# Patient Record
Sex: FEMALE | Race: WHITE | NOT HISPANIC OR LATINO | Employment: OTHER | URBAN - METROPOLITAN AREA
[De-identification: names, ages, dates, MRNs, and addresses within clinical notes are randomized per-mention and may not be internally consistent; named-entity substitution may affect disease eponyms.]

---

## 2020-07-31 LAB
SARS-COV-2, PCR: NOT DETECTED
SPECIMEN SOURCE: NORMAL

## 2020-08-04 LAB — SURGICAL PATHOLOGY REPORT: NORMAL

## 2021-03-22 LAB
SARS-COV-2, PCR: NOT DETECTED
SPECIMEN SOURCE: NORMAL

## 2021-03-25 LAB — SURGICAL PATHOLOGY REPORT: NORMAL

## 2023-03-02 PROBLEM — M17.10 LOCALIZED PRIMARY OSTEOARTHRITIS OF LOWER LEG: Status: ACTIVE | Noted: 2023-03-02

## 2023-03-02 PROBLEM — I89.8 LYMPHORRHEA: Status: ACTIVE | Noted: 2023-03-02

## 2023-03-02 PROBLEM — G40.909 NONINTRACTABLE EPILEPSY WITHOUT STATUS EPILEPTICUS (MULTI): Status: ACTIVE | Noted: 2023-03-02

## 2023-03-02 PROBLEM — R55 POSTURAL DIZZINESS WITH PRESYNCOPE: Status: ACTIVE | Noted: 2023-03-02

## 2023-03-02 PROBLEM — R79.89 ELEVATED BRAIN NATRIURETIC PEPTIDE (BNP) LEVEL: Status: ACTIVE | Noted: 2023-03-02

## 2023-03-02 PROBLEM — R27.0 ATAXIA, UNSPECIFIED: Status: ACTIVE | Noted: 2023-03-02

## 2023-03-02 PROBLEM — F07.81 POST-CONCUSSION SYNDROME: Status: ACTIVE | Noted: 2023-03-02

## 2023-03-02 PROBLEM — E83.51 HYPOCALCEMIA: Status: ACTIVE | Noted: 2023-03-02

## 2023-03-02 PROBLEM — R32 URINARY INCONTINENCE: Status: ACTIVE | Noted: 2023-03-02

## 2023-03-02 PROBLEM — H61.23 BILATERAL HEARING LOSS DUE TO CERUMEN IMPACTION: Status: ACTIVE | Noted: 2023-03-02

## 2023-03-02 PROBLEM — G90.8 DYSAUTONOMIA-LIKE DISORDER: Status: ACTIVE | Noted: 2023-03-02

## 2023-03-02 PROBLEM — R42 VERTIGO: Status: ACTIVE | Noted: 2023-03-02

## 2023-03-02 PROBLEM — S83.92XA SPRAIN OF LEFT KNEE: Status: ACTIVE | Noted: 2023-03-02

## 2023-03-02 PROBLEM — H26.9 UNSPECIFIED CATARACT: Status: ACTIVE | Noted: 2023-03-02

## 2023-03-02 PROBLEM — K12.0 APHTHOUS ULCER: Status: ACTIVE | Noted: 2023-03-02

## 2023-03-02 PROBLEM — M25.559 JOINT PAIN, HIP: Status: ACTIVE | Noted: 2023-03-02

## 2023-03-02 PROBLEM — M17.12 OSTEOARTHRITIS OF LEFT KNEE: Status: ACTIVE | Noted: 2023-03-02

## 2023-03-02 PROBLEM — R00.1 BRADYCARDIA: Status: ACTIVE | Noted: 2023-03-02

## 2023-03-02 PROBLEM — Z96.652 TOTAL KNEE REPLACEMENT STATUS, LEFT: Status: ACTIVE | Noted: 2023-03-02

## 2023-03-02 PROBLEM — R74.8 ELEVATED ALKALINE PHOSPHATASE LEVEL: Status: ACTIVE | Noted: 2023-03-02

## 2023-03-02 PROBLEM — M06.9 RHEUMATOID ARTHRITIS (MULTI): Status: ACTIVE | Noted: 2023-03-02

## 2023-03-02 PROBLEM — D64.9 ANEMIA: Status: ACTIVE | Noted: 2023-03-02

## 2023-03-02 PROBLEM — G40.209: Status: ACTIVE | Noted: 2023-03-02
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
## 2023-03-02 PROBLEM — B00.9 HERPES SIMPLEX: Status: ACTIVE | Noted: 2023-03-02

## 2023-03-02 PROBLEM — K59.00 ACUTE CONSTIPATION: Status: ACTIVE | Noted: 2023-03-02

## 2023-03-02 PROBLEM — R15.9 STOOL INCONTINENCE: Status: ACTIVE | Noted: 2023-03-02

## 2023-03-02 PROBLEM — K59.09 CHRONIC CONSTIPATION: Status: ACTIVE | Noted: 2023-03-02

## 2023-03-02 PROBLEM — R35.0 FREQUENT URINATION: Status: ACTIVE | Noted: 2023-03-02

## 2023-03-02 PROBLEM — I72.9 ANEURYSM (CMS-HCC): Status: ACTIVE | Noted: 2023-03-02

## 2023-03-02 PROBLEM — R26.2 DIFFICULTY WALKING: Status: ACTIVE | Noted: 2023-03-02

## 2023-03-02 PROBLEM — R53.1 GENERALIZED WEAKNESS: Status: ACTIVE | Noted: 2023-03-02

## 2023-03-02 PROBLEM — R42 POSTURAL DIZZINESS WITH PRESYNCOPE: Status: ACTIVE | Noted: 2023-03-02

## 2023-03-02 PROBLEM — K21.9 ESOPHAGEAL REFLUX: Status: ACTIVE | Noted: 2023-03-02

## 2023-03-02 PROBLEM — M25.561 RIGHT KNEE PAIN: Status: ACTIVE | Noted: 2023-03-02

## 2023-03-02 PROBLEM — S63.259A FINGER DISLOCATION: Status: ACTIVE | Noted: 2023-03-02

## 2023-03-02 PROBLEM — I10 BENIGN ESSENTIAL HTN: Status: ACTIVE | Noted: 2023-03-02

## 2023-03-02 PROBLEM — B37.2 CANDIDAL INTERTRIGO: Status: ACTIVE | Noted: 2023-03-02

## 2023-03-02 PROBLEM — M54.50 LOW BACK PAIN: Status: ACTIVE | Noted: 2023-03-02

## 2023-03-02 PROBLEM — M54.10 RADICULOPATHY: Status: ACTIVE | Noted: 2023-03-02

## 2023-03-02 PROBLEM — S00.83XA FACIAL BRUISING: Status: ACTIVE | Noted: 2023-03-02

## 2023-03-02 PROBLEM — N39.0 URINARY TRACT INFECTION: Status: ACTIVE | Noted: 2023-03-02

## 2023-03-02 PROBLEM — Z96.653 STATUS POST BILATERAL KNEE REPLACEMENTS: Status: ACTIVE | Noted: 2023-03-02

## 2023-03-02 PROBLEM — E87.1 HYPONATREMIA: Status: ACTIVE | Noted: 2023-03-02

## 2023-03-02 PROBLEM — M16.10 UNILATERAL PRIMARY OSTEOARTHRITIS, UNSPECIFIED HIP: Status: ACTIVE | Noted: 2023-03-02

## 2023-03-02 PROBLEM — I10 UNCONTROLLED HYPERTENSION: Status: ACTIVE | Noted: 2023-03-02

## 2023-03-02 PROBLEM — I35.0 AORTIC STENOSIS, MILD: Status: ACTIVE | Noted: 2023-03-02

## 2023-03-02 PROBLEM — R26.9 GAIT DISORDER: Status: ACTIVE | Noted: 2023-03-02

## 2023-03-02 PROBLEM — G47.33 OBSTRUCTIVE SLEEP APNEA: Status: ACTIVE | Noted: 2023-03-02

## 2023-03-02 PROBLEM — E55.9 VITAMIN D DEFICIENCY: Status: ACTIVE | Noted: 2023-03-02

## 2023-03-02 PROBLEM — M19.90 OSTEOARTHRITIS: Status: ACTIVE | Noted: 2023-03-02

## 2023-03-02 PROBLEM — M19.90 ARTHRITIS: Status: ACTIVE | Noted: 2023-03-02

## 2023-03-02 PROBLEM — M16.9 OSTEOARTHROSIS, HIP: Status: ACTIVE | Noted: 2023-03-02

## 2023-03-02 PROBLEM — G90.89 DYSAUTONOMIA-LIKE DISORDER: Status: ACTIVE | Noted: 2023-03-02

## 2023-03-02 PROBLEM — R30.0 DYSURIA: Status: ACTIVE | Noted: 2023-03-02

## 2023-03-02 PROBLEM — R55 SYNCOPE AND COLLAPSE: Status: ACTIVE | Noted: 2023-03-02

## 2023-03-02 PROBLEM — D12.6 TUBULOVILLOUS ADENOMA OF COLON: Status: ACTIVE | Noted: 2023-03-02

## 2023-03-02 PROBLEM — G44.52 NEW DAILY PERSISTENT HEADACHE: Status: ACTIVE | Noted: 2023-03-02

## 2023-03-02 PROBLEM — S80.212A ABRASION OF KNEE, LEFT: Status: ACTIVE | Noted: 2023-03-02

## 2023-03-02 PROBLEM — R21 RASH: Status: ACTIVE | Noted: 2023-03-02

## 2023-03-02 PROBLEM — M25.511 PAIN IN JOINT OF RIGHT SHOULDER: Status: ACTIVE | Noted: 2023-03-02

## 2023-03-02 PROBLEM — R56.9 UNSPECIFIED CONVULSIONS (MULTI): Status: ACTIVE | Noted: 2023-03-02

## 2023-03-02 PROBLEM — K42.9 UMBILICAL HERNIA WITHOUT OBSTRUCTION OR GANGRENE: Status: ACTIVE | Noted: 2023-03-02

## 2023-03-02 PROBLEM — T22.219A: Status: ACTIVE | Noted: 2023-03-02

## 2023-03-02 PROBLEM — R29.898 WEAKNESS OF LEFT LOWER EXTREMITY: Status: ACTIVE | Noted: 2023-03-02

## 2023-03-02 PROBLEM — H01.009 BLEPHARITIS: Status: ACTIVE | Noted: 2023-03-02

## 2023-03-02 PROBLEM — J01.20 SINUSITIS, ACUTE ETHMOIDAL: Status: ACTIVE | Noted: 2023-03-02

## 2023-03-02 PROBLEM — M25.611 DECREASED RANGE OF MOTION OF SHOULDER, RIGHT: Status: ACTIVE | Noted: 2023-03-02

## 2023-03-02 PROBLEM — F41.9 ANXIETY DISORDER: Status: ACTIVE | Noted: 2023-03-02

## 2023-03-02 PROBLEM — N81.4 UTERINE PROLAPSE: Status: ACTIVE | Noted: 2023-03-02

## 2023-04-03 DIAGNOSIS — I10 UNCONTROLLED HYPERTENSION: ICD-10-CM

## 2023-04-03 DIAGNOSIS — I10 ESSENTIAL (PRIMARY) HYPERTENSION: ICD-10-CM

## 2023-04-03 RX ORDER — AMLODIPINE BESYLATE 2.5 MG/1
2.5 TABLET ORAL DAILY
COMMUNITY
End: 2023-10-23 | Stop reason: SDUPTHER

## 2023-04-03 RX ORDER — ATORVASTATIN CALCIUM 40 MG/1
40 TABLET, FILM COATED ORAL DAILY
COMMUNITY
End: 2023-10-23 | Stop reason: SDUPTHER

## 2023-04-03 RX ORDER — HYDROXYCHLOROQUINE SULFATE 200 MG/1
1 TABLET, FILM COATED ORAL
COMMUNITY
End: 2024-04-08

## 2023-04-03 RX ORDER — ACETAMINOPHEN 500 MG
1 TABLET ORAL DAILY
COMMUNITY

## 2023-04-03 RX ORDER — PNV NO.95/FERROUS FUM/FOLIC AC 28MG-0.8MG
1 TABLET ORAL DAILY
COMMUNITY

## 2023-04-03 RX ORDER — FOLIC ACID 1 MG/1
1 TABLET ORAL DAILY
COMMUNITY
End: 2024-02-08 | Stop reason: SDUPTHER

## 2023-04-07 RX ORDER — FUROSEMIDE 40 MG/1
TABLET ORAL
Qty: 30 TABLET | Refills: 0 | Status: SHIPPED | OUTPATIENT
Start: 2023-04-07 | End: 2023-10-23 | Stop reason: SDUPTHER

## 2023-04-09 RX ORDER — TALC
250 POWDER (GRAM) TOPICAL
COMMUNITY

## 2023-04-09 RX ORDER — OXCARBAZEPINE 300 MG/1
3.5 TABLET, FILM COATED ORAL 2 TIMES DAILY
COMMUNITY
End: 2024-02-06 | Stop reason: SDUPTHER

## 2023-04-09 RX ORDER — METHOTREXATE 2.5 MG/1
5 TABLET ORAL
COMMUNITY
End: 2024-05-02 | Stop reason: SDUPTHER

## 2023-04-09 RX ORDER — TROSPIUM CHLORIDE 20 MG/1
20 TABLET, FILM COATED ORAL 2 TIMES DAILY
Status: ON HOLD | COMMUNITY
End: 2024-03-07 | Stop reason: WASHOUT

## 2023-04-09 RX ORDER — GUAIFENESIN 600 MG/1
1 TABLET, EXTENDED RELEASE ORAL DAILY PRN
COMMUNITY

## 2023-04-09 RX ORDER — SERTRALINE HYDROCHLORIDE 25 MG/1
1 TABLET, FILM COATED ORAL DAILY
COMMUNITY
Start: 2022-02-28 | End: 2023-10-23 | Stop reason: SDUPTHER

## 2023-04-09 RX ORDER — LEVETIRACETAM 500 MG/1
500 TABLET ORAL 2 TIMES DAILY
Status: ON HOLD | COMMUNITY
End: 2024-03-07 | Stop reason: WASHOUT

## 2023-04-09 RX ORDER — MECLIZINE HYDROCHLORIDE 25 MG/1
25 TABLET ORAL 3 TIMES DAILY PRN
Status: ON HOLD | COMMUNITY
End: 2024-03-07 | Stop reason: WASHOUT

## 2023-07-12 DIAGNOSIS — F41.9 ANXIETY DISORDER, UNSPECIFIED: ICD-10-CM

## 2023-07-16 RX ORDER — SERTRALINE HYDROCHLORIDE 100 MG/1
TABLET, FILM COATED ORAL
Qty: 90 TABLET | Refills: 3 | Status: SHIPPED | OUTPATIENT
Start: 2023-07-16

## 2023-07-17 LAB
ALANINE AMINOTRANSFERASE (SGPT) (U/L) IN SER/PLAS: 16 U/L (ref 7–45)
ALBUMIN (G/DL) IN SER/PLAS: 4 G/DL (ref 3.4–5)
ALKALINE PHOSPHATASE (U/L) IN SER/PLAS: 142 U/L (ref 33–136)
ANION GAP IN SER/PLAS: 15 MMOL/L (ref 10–20)
ASPARTATE AMINOTRANSFERASE (SGOT) (U/L) IN SER/PLAS: 34 U/L (ref 9–39)
BILIRUBIN TOTAL (MG/DL) IN SER/PLAS: 0.3 MG/DL (ref 0–1.2)
C REACTIVE PROTEIN (MG/L) IN SER/PLAS: 0.81 MG/DL
CALCIUM (MG/DL) IN SER/PLAS: 8.8 MG/DL (ref 8.6–10.3)
CARBON DIOXIDE, TOTAL (MMOL/L) IN SER/PLAS: 26 MMOL/L (ref 21–32)
CHLORIDE (MMOL/L) IN SER/PLAS: 97 MMOL/L (ref 98–107)
CREATININE (MG/DL) IN SER/PLAS: 0.75 MG/DL (ref 0.5–1.05)
ERYTHROCYTE DISTRIBUTION WIDTH (RATIO) BY AUTOMATED COUNT: 13.8 % (ref 11.5–14.5)
ERYTHROCYTE MEAN CORPUSCULAR HEMOGLOBIN CONCENTRATION (G/DL) BY AUTOMATED: 33.1 G/DL (ref 32–36)
ERYTHROCYTE MEAN CORPUSCULAR VOLUME (FL) BY AUTOMATED COUNT: 96 FL (ref 80–100)
ERYTHROCYTES (10*6/UL) IN BLOOD BY AUTOMATED COUNT: 3.64 X10E12/L (ref 4–5.2)
GFR FEMALE: 82 ML/MIN/1.73M2
GLUCOSE (MG/DL) IN SER/PLAS: 65 MG/DL (ref 74–99)
HEMATOCRIT (%) IN BLOOD BY AUTOMATED COUNT: 35 % (ref 36–46)
HEMOGLOBIN (G/DL) IN BLOOD: 11.6 G/DL (ref 12–16)
LEUKOCYTES (10*3/UL) IN BLOOD BY AUTOMATED COUNT: 6.4 X10E9/L (ref 4.4–11.3)
PLATELETS (10*3/UL) IN BLOOD AUTOMATED COUNT: 221 X10E9/L (ref 150–450)
POTASSIUM (MMOL/L) IN SER/PLAS: 4.5 MMOL/L (ref 3.5–5.3)
PROTEIN TOTAL: 6.4 G/DL (ref 6.4–8.2)
SEDIMENTATION RATE, ERYTHROCYTE: 24 MM/H (ref 0–30)
SODIUM (MMOL/L) IN SER/PLAS: 133 MMOL/L (ref 136–145)
UREA NITROGEN (MG/DL) IN SER/PLAS: 16 MG/DL (ref 6–23)

## 2023-07-20 LAB — OXCARB OR ESLICARB METABOLITE (MHD): 35 UG/ML (ref 3–35)

## 2023-08-14 LAB — URINE CULTURE: ABNORMAL

## 2023-09-20 PROBLEM — E66.813 CLASS 3 SEVERE OBESITY DUE TO EXCESS CALORIES WITH BODY MASS INDEX (BMI) OF 40.0 TO 44.9 IN ADULT: Status: ACTIVE | Noted: 2023-09-20

## 2023-09-20 PROBLEM — E66.9 CLASS 2 OBESITY WITH BODY MASS INDEX (BMI) OF 38.0 TO 38.9 IN ADULT: Status: ACTIVE | Noted: 2023-09-20

## 2023-09-20 PROBLEM — H90.3 ASYMMETRICAL SENSORINEURAL HEARING LOSS: Status: ACTIVE | Noted: 2021-07-08

## 2023-09-20 PROBLEM — Z98.890 HISTORY OF UMBILICAL HERNIA REPAIR: Status: ACTIVE | Noted: 2023-09-20

## 2023-09-20 PROBLEM — Z04.9 CONDITION NOT FOUND: Status: ACTIVE | Noted: 2023-09-20

## 2023-09-20 PROBLEM — M25.561 RIGHT KNEE PAIN: Status: RESOLVED | Noted: 2023-03-02 | Resolved: 2023-09-20

## 2023-09-20 PROBLEM — E66.812 CLASS 2 OBESITY WITH BODY MASS INDEX (BMI) OF 38.0 TO 38.9 IN ADULT: Status: ACTIVE | Noted: 2023-09-20

## 2023-09-20 PROBLEM — E66.01 CLASS 2 SEVERE OBESITY WITH SERIOUS COMORBIDITY AND BODY MASS INDEX (BMI) OF 38.0 TO 38.9 IN ADULT (MULTI): Status: ACTIVE | Noted: 2023-09-20

## 2023-09-20 PROBLEM — M25.511 PAIN IN JOINT OF RIGHT SHOULDER: Status: RESOLVED | Noted: 2023-03-02 | Resolved: 2023-09-20

## 2023-09-20 PROBLEM — N81.6 POSTERIOR VAGINAL WALL PROLAPSE: Status: ACTIVE | Noted: 2023-09-20

## 2023-09-20 PROBLEM — N95.2 ATROPHY OF VAGINA: Status: ACTIVE | Noted: 2023-09-20

## 2023-09-20 PROBLEM — Z87.19 HISTORY OF UMBILICAL HERNIA REPAIR: Status: ACTIVE | Noted: 2023-09-20

## 2023-09-20 PROBLEM — J01.20 SINUSITIS, ACUTE ETHMOIDAL: Status: RESOLVED | Noted: 2023-03-02 | Resolved: 2023-09-20

## 2023-09-20 PROBLEM — N39.0 URINARY TRACT INFECTION: Status: RESOLVED | Noted: 2023-03-02 | Resolved: 2023-09-20

## 2023-09-20 PROBLEM — K59.00 ACUTE CONSTIPATION: Status: RESOLVED | Noted: 2023-03-02 | Resolved: 2023-09-20

## 2023-09-20 PROBLEM — Z91.81 H/O FALLING: Status: ACTIVE | Noted: 2023-09-20

## 2023-09-20 PROBLEM — E66.812 CLASS 2 SEVERE OBESITY WITH SERIOUS COMORBIDITY AND BODY MASS INDEX (BMI) OF 38.0 TO 38.9 IN ADULT: Status: ACTIVE | Noted: 2023-09-20

## 2023-09-20 PROBLEM — M81.0 AGE RELATED OSTEOPOROSIS: Status: ACTIVE | Noted: 2023-09-20

## 2023-09-20 PROBLEM — H90.3 SENSORINEURAL HEARING LOSS, BILATERAL: Status: ACTIVE | Noted: 2021-07-08

## 2023-09-20 PROBLEM — M54.50 LOW BACK PAIN: Status: RESOLVED | Noted: 2023-03-02 | Resolved: 2023-09-20

## 2023-09-20 PROBLEM — M25.559 JOINT PAIN, HIP: Status: RESOLVED | Noted: 2023-03-02 | Resolved: 2023-09-20

## 2023-09-20 PROBLEM — E66.01 CLASS 3 SEVERE OBESITY DUE TO EXCESS CALORIES WITH BODY MASS INDEX (BMI) OF 40.0 TO 44.9 IN ADULT (MULTI): Status: ACTIVE | Noted: 2023-09-20

## 2023-09-20 PROBLEM — R60.9 EDEMA: Status: ACTIVE | Noted: 2023-09-20

## 2023-09-20 RX ORDER — NYSTATIN 100000 U/G
CREAM TOPICAL 2 TIMES DAILY
Status: ON HOLD | COMMUNITY
End: 2024-03-07 | Stop reason: WASHOUT

## 2023-09-20 RX ORDER — AMLODIPINE BESYLATE 5 MG/1
5 TABLET ORAL EVERY 12 HOURS
COMMUNITY
Start: 2023-07-02 | End: 2023-10-23 | Stop reason: SDUPTHER

## 2023-09-20 RX ORDER — POLYETHYLENE GLYOCOL 3350, SODIUM CHLORIDE, SODIUM BICARBONATE AND POTASSIUM CHLORIDE 420; 11.2; 5.72; 1.48 G/4L; G/4L; G/4L; G/4L
POWDER, FOR SOLUTION NASOGASTRIC; ORAL
Status: ON HOLD | COMMUNITY
End: 2024-03-07 | Stop reason: WASHOUT

## 2023-09-20 RX ORDER — OXYBUTYNIN CHLORIDE 5 MG/1
1 TABLET ORAL 2 TIMES DAILY
Status: ON HOLD | COMMUNITY
End: 2024-03-07 | Stop reason: WASHOUT

## 2023-09-20 RX ORDER — POLYETHYLENE GLYCOL 3350, SODIUM SULFATE ANHYDROUS, SODIUM BICARBONATE, SODIUM CHLORIDE, POTASSIUM CHLORIDE 236; 22.74; 6.74; 5.86; 2.97 G/4L; G/4L; G/4L; G/4L; G/4L
POWDER, FOR SOLUTION ORAL
COMMUNITY
End: 2023-10-23 | Stop reason: SDUPTHER

## 2023-09-20 RX ORDER — OMEPRAZOLE 20 MG/1
40 TABLET, DELAYED RELEASE ORAL
COMMUNITY
Start: 2023-07-10

## 2023-09-20 RX ORDER — CARVEDILOL 25 MG/1
0.5 TABLET ORAL EVERY 12 HOURS SCHEDULED
COMMUNITY
End: 2024-03-27 | Stop reason: WASHOUT

## 2023-09-20 RX ORDER — DOXYCYCLINE 100 MG/1
1 CAPSULE ORAL EVERY 12 HOURS
Status: ON HOLD | COMMUNITY
End: 2024-03-07 | Stop reason: WASHOUT

## 2023-09-20 RX ORDER — CIPROFLOXACIN 500 MG/1
1 TABLET ORAL 2 TIMES DAILY
Status: ON HOLD | COMMUNITY
Start: 2023-02-06 | End: 2024-03-07 | Stop reason: WASHOUT

## 2023-09-20 RX ORDER — TRIAMCINOLONE ACETONIDE 1 MG/G
1 CREAM TOPICAL 2 TIMES DAILY
Status: ON HOLD | COMMUNITY
End: 2024-03-07 | Stop reason: WASHOUT

## 2023-09-20 RX ORDER — NITROFURANTOIN 25; 75 MG/1; MG/1
1 CAPSULE ORAL
Status: ON HOLD | COMMUNITY
Start: 2023-01-11 | End: 2024-03-07 | Stop reason: WASHOUT

## 2023-09-20 RX ORDER — IBUPROFEN 200 MG
200 TABLET ORAL NIGHTLY
Status: ON HOLD | COMMUNITY
End: 2024-03-07 | Stop reason: WASHOUT

## 2023-09-20 RX ORDER — VIT A/VIT C/VIT E/ZINC/COPPER 4296-226
CAPSULE ORAL
COMMUNITY
Start: 2022-10-03 | End: 2024-02-15 | Stop reason: SDUPTHER

## 2023-09-20 RX ORDER — POTASSIUM CHLORIDE 20 MEQ/1
20 TABLET, EXTENDED RELEASE ORAL DAILY
Status: ON HOLD | COMMUNITY
End: 2024-03-07 | Stop reason: WASHOUT

## 2023-09-20 RX ORDER — FAMCICLOVIR 500 MG/1
500 TABLET ORAL 2 TIMES DAILY PRN
COMMUNITY
Start: 2022-10-03 | End: 2024-03-27 | Stop reason: WASHOUT

## 2023-10-23 ENCOUNTER — OFFICE VISIT (OUTPATIENT)
Dept: CARDIOLOGY | Facility: HOSPITAL | Age: 78
End: 2023-10-23
Payer: MEDICARE

## 2023-10-23 VITALS
WEIGHT: 212.96 LBS | SYSTOLIC BLOOD PRESSURE: 147 MMHG | HEART RATE: 68 BPM | OXYGEN SATURATION: 97 % | DIASTOLIC BLOOD PRESSURE: 68 MMHG | BODY MASS INDEX: 37.72 KG/M2

## 2023-10-23 DIAGNOSIS — I10 ESSENTIAL (PRIMARY) HYPERTENSION: ICD-10-CM

## 2023-10-23 DIAGNOSIS — R55 SYNCOPE, UNSPECIFIED SYNCOPE TYPE: Primary | ICD-10-CM

## 2023-10-23 PROCEDURE — 3077F SYST BP >= 140 MM HG: CPT | Performed by: NURSE PRACTITIONER

## 2023-10-23 PROCEDURE — 3078F DIAST BP <80 MM HG: CPT | Performed by: NURSE PRACTITIONER

## 2023-10-23 PROCEDURE — 99213 OFFICE O/P EST LOW 20 MIN: CPT | Mod: PO | Performed by: NURSE PRACTITIONER

## 2023-10-23 PROCEDURE — 1159F MED LIST DOCD IN RCRD: CPT | Performed by: NURSE PRACTITIONER

## 2023-10-23 PROCEDURE — 99213 OFFICE O/P EST LOW 20 MIN: CPT | Performed by: NURSE PRACTITIONER

## 2023-10-23 PROCEDURE — 1126F AMNT PAIN NOTED NONE PRSNT: CPT | Performed by: NURSE PRACTITIONER

## 2023-10-23 PROCEDURE — 1036F TOBACCO NON-USER: CPT | Performed by: NURSE PRACTITIONER

## 2023-10-23 RX ORDER — FUROSEMIDE 40 MG/1
TABLET ORAL
Qty: 30 TABLET | Refills: 3 | Status: SHIPPED | OUTPATIENT
Start: 2023-10-23

## 2023-10-23 RX ORDER — AMLODIPINE BESYLATE 5 MG/1
5 TABLET ORAL EVERY 12 HOURS
Qty: 180 TABLET | Refills: 3 | Status: ON HOLD | OUTPATIENT
Start: 2023-10-23 | End: 2024-04-02 | Stop reason: SDUPTHER

## 2023-10-23 RX ORDER — ATORVASTATIN CALCIUM 40 MG/1
40 TABLET, FILM COATED ORAL DAILY
Qty: 90 TABLET | Refills: 3 | Status: ON HOLD | OUTPATIENT
Start: 2023-10-23 | End: 2024-04-02 | Stop reason: SDUPTHER

## 2023-10-23 ASSESSMENT — ENCOUNTER SYMPTOMS
BLURRED VISION: 1
GASTROINTESTINAL NEGATIVE: 1
LIGHT-HEADEDNESS: 1
DEPRESSION: 0
ENDOCRINE NEGATIVE: 1
CARDIOVASCULAR NEGATIVE: 1
PSYCHIATRIC NEGATIVE: 1
CONSTITUTIONAL NEGATIVE: 1
RESPIRATORY NEGATIVE: 1
MYALGIAS: 1
DIZZINESS: 1
ALLERGIC/IMMUNOLOGIC NEGATIVE: 1

## 2023-10-23 NOTE — PROGRESS NOTES
Referred by Dr. Neil ref. provider found for Follow-up (Routine.)     History Of Present Illness:    Mariajose Haq is a very pleasant 78 year old female with a history of HTN and syncope she is here for a follow up visit. The patient is seen in collaboration with Dr. Tran. Mrs. Haq had had a few episodes of lightheadedness. Denies syncope. Denies chest pain or shortness of breath. Continues to stay active as she can.     Review of Systems   Constitutional: Negative.   HENT: Negative.     Eyes:  Positive for blurred vision.   Cardiovascular: Negative.    Respiratory: Negative.     Endocrine: Negative.    Skin: Negative.    Musculoskeletal:  Positive for muscle weakness and myalgias.   Gastrointestinal: Negative.    Neurological:  Positive for dizziness and light-headedness.   Psychiatric/Behavioral: Negative.     Allergic/Immunologic: Negative.         Past Medical History:  She has a past medical history of Ataxia, unspecified, Other conditions influencing health status, Other conditions influencing health status, Other specified anxiety disorders, Other specified anxiety disorders, Pain in unspecified joint, Personal history of other diseases of the circulatory system (09/27/2018), Personal history of other diseases of the nervous system and sense organs, Personal history of other specified conditions, Umbilical hernia without obstruction or gangrene, Unspecified cataract, Unspecified convulsions (CMS/HCC), and Unspecified convulsions (CMS/HCC).    Past Surgical History:  She has a past surgical history that includes Other surgical history (03/07/2013); Colonoscopy (03/11/2013); Other surgical history (06/08/2016); Other surgical history (07/21/2020); Cataract extraction (07/17/2013); Other surgical history (05/12/2020); Other surgical history (05/12/2020); CT angio head w and wo IV contrast (1/6/2016); CT angio head w and wo IV contrast (3/1/2016); CT angio head w and wo IV contrast (5/22/2020); and CT  angio neck w and wo IV contrast (5/22/2020).      Social History:  She reports that she has never smoked. She has never used smokeless tobacco. She reports that she does not currently use alcohol. She reports that she does not use drugs.    Family History:  Family History   Problem Relation Name Age of Onset    Alzheimer's disease Mother      Colon cancer Father      Diabetes Father      Hypertension Father      Subarachnoid hemorrhage Father          nontraumatic    Hypertension Sister      Other (Cardiac problems) Sister      Cancer Brother      Gout Brother      Hypertension Brother      Pancreatitis Brother      Coronary artery disease Paternal Grandfather      Autism Other          Childhood onset pervasive developmental autistic disorder    Hypertension Other          Allergies:  Clobazam, Lamotrigine, Levetiracetam, Topiramate, and Penicillins    Outpatient Medications:  Current Outpatient Medications   Medication Instructions    amLODIPine (NORVASC) 5 mg, oral, Every 12 hours    antiox.mv no.10/omeg3s/lut/day (I-CAPS ORAL) oral, As directed    atorvastatin (LIPITOR) 40 mg, oral, Daily    B2/vits A,C,E/lut/zeaxanth/min (ICAPS ORAL) Take as directed    carvedilol (Coreg) 25 mg tablet 0.5 tablets, oral, Every 12 hours scheduled    cholecalciferol (Vitamin D-3) 50 mcg (2,000 unit) capsule 1 capsule, oral, Daily    ciprofloxacin (Cipro) 500 mg tablet 1 tablet, oral, 2 times daily    cyanocobalamin (Vitamin B-12) 100 mcg tablet 1 tablet, oral, Daily    diclofenac sodium 1 % kit Topical, 4 times daily    doxycycline (Vibramycin) 100 mg capsule 1 capsule, oral, Every 12 hours    estrogens, conjugated, (Premarin) vaginal cream Insert 0.5 Applicator bedtime 0.5 g per vagina 2 nights a week    famciclovir (FAMVIR) 500 mg, oral, 2 times daily PRN    folic acid (FOLVITE) 1 mg, oral, Daily    furosemide (Lasix) 40 mg tablet TAKE 1 TABLET BY MOUTH EVERY DAY AS NEEDED    guaiFENesin (Mucinex) 600 mg 12 hr tablet 1-2  tablets, oral, 2 times daily PRN    hydroxychloroquine (Plaquenil) 200 mg tablet 1 tablet, oral, 2 times daily with meals    ibuprofen-diphenhydramine cit 200-38 mg tablet per tablet 2 tablets, oral, Nightly    ibuprofen 200 mg, oral, Every 8 hours    levETIRAcetam (KEPPRA) 500 mg, oral, 2 times daily    magnesium oxide (MAG-OX) 250 mg, oral, Once or twice daily to relax muscles    meclizine (ANTIVERT) 25 mg, oral, 3 times daily PRN    methotrexate (Trexall) 2.5 mg tablet 5 tablets, oral, Weekly    nitrofurantoin, macrocrystal-monohydrate, (Macrobid) 100 mg capsule 1 capsule, oral, 2 times daily after meals    nystatin (Mycostatin) cream Topical, 2 times daily    omeprazole OTC (PriLOSEC OTC) 20 mg EC tablet 1 tablet, oral, 2 times daily    OXcarbazepine (Trileptal) 300 mg tablet 3.5 tablets, oral, 2 times daily    oxybutynin (Ditropan) 5 mg tablet 1 tablet, oral, 2 times daily    polyethylene glycol-electrolytes (TriLyte With Flavor Packets) 420 gram solution oral,  Drink 2 L (2,000 mL) at 500 pm the night prior to the procedure then drink the other 2 L (2,000 mL) 5 hours prior to procedure time.    potassium chloride CR 20 mEq ER tablet 20 mEq, oral, Daily, Do not crush or chew.    sertraline (Zoloft) 100 mg tablet TAKE 1 TABLET ONCE DAILY.    triamcinolone (Kenalog) 0.1 % cream 1 Film, Topical, 2 times daily    trospium (SANCTURA) 20 mg, oral, 2 times daily    vitamins A,C,E-zinc-copper (ICaps AREDS) 4,296 mcg-226 mg-90 mg capsule oral, As directed        Last Recorded Vitals:  Vitals:    10/23/23 1012   BP: 147/68   Pulse: 68   SpO2: 97%   Weight: 96.6 kg (212 lb 15.4 oz)       Physical Exam:  Physical Exam  Vitals reviewed.   HENT:      Head: Normocephalic.      Nose: Nose normal.   Eyes:      Pupils: Pupils are equal, round, and reactive to light.   Cardiovascular:      Rate and Rhythm: Normal rate and regular rhythm.   Pulmonary:      Effort: Pulmonary effort is normal.      Breath sounds: Normal breath  sounds.   Abdominal:      General: Abdomen is flat.      Palpations: Abdomen is soft.   Musculoskeletal:         General: Normal range of motion.      Cervical back: Normal range of motion.   Skin:     General: Skin is warm and dry.   Neurological:      General: No focal deficit present.      Mental Status: He is alert and oriented to person, place, and time.   Psychiatric:         Mood and Affect: Mood normal.            Last Labs:  CBC -  Lab Results   Component Value Date    WBC 6.4 07/17/2023    HGB 11.6 (L) 07/17/2023    HCT 35.0 (L) 07/17/2023    MCV 96 07/17/2023     07/17/2023       CMP -  Lab Results   Component Value Date    CALCIUM 8.8 07/17/2023    PHOS 2.9 10/27/2021    PROT 6.4 07/17/2023    ALBUMIN 4.0 07/17/2023    AST 34 07/17/2023    ALT 16 07/17/2023    ALKPHOS 142 (H) 07/17/2023    BILITOT 0.3 07/17/2023       LIPID PANEL -   Lab Results   Component Value Date    CHOL 240 (H) 11/11/2022    TRIG 75 11/11/2022    HDL 88.8 11/11/2022    CHHDL 2.7 11/11/2022    LDLF 136 (H) 11/11/2022    VLDL 15 11/11/2022       RENAL FUNCTION PANEL -   Lab Results   Component Value Date    GLUCOSE 65 (L) 07/17/2023     (L) 07/17/2023    K 4.5 07/17/2023    CL 97 (L) 07/17/2023    CO2 26 07/17/2023    ANIONGAP 15 07/17/2023    BUN 16 07/17/2023    CREATININE 0.75 07/17/2023    CALCIUM 8.8 07/17/2023    PHOS 2.9 10/27/2021    ALBUMIN 4.0 07/17/2023        Lab Results   Component Value Date    BNP 33 04/08/2022       Last Cardiology Tests:  ECG:    Echo:  Echocardiogram 4/8/2022  1. The left ventricular systolic function is normal with a 55-60% estimated ejection fraction.  2. Spectral Doppler shows an impaired relaxation pattern of left ventricular diastolic filling.  3. Mild aortic valve stenosis.  4. There is mild aortic valve regurgitation.  5. There is mild mitral and tricuspid regurgitation.  6. The estimated pulmonary artery pressure is mildly elevated with the RVSP at 40.5 mmHg.      Echocardiogram  3/22/2021  1. The left ventricular systolic function is normal with a 60-65% estimated  ejection fraction.  2. Spectral Doppler shows an impaired relaxation pattern of left ventricular  diastolic filling.  3. The left atrium is moderately dilated.  4. Mild aortic valve stenosis.  5. There is mild aortic valve regurgitation.  6. There is mild mitral and tricuspid regurgitation.  7. The estimated pulmonary artery pressure is mildly elevated with the RVSP at  46.2 mmHg.    echocardiogram 5/25/2018   1. The left ventricular systolic function is normal with a 60% estimated  ejection fraction.   2. Spectral Doppler shows an impaired relaxation pattern of left ventricular  diastolic filling.   3. The left atrium is moderately dilated.   4. Mild aortic valve stenosis.   5. The estimated pulmonary artery pressure is mildly elevated with the RVSP at  36.5 mmHg.  Echo August 2013:  The left ventricular chamber size is normal.  There is left ventricular hypertrophy noted.  Global left ventricular wall motion and contractility are within normal  limits.  There is an E to A reversal in the mitral valve flow pattern suggestive  of diastolic dysfunction.  There is mild mitral regurgitation.   There is mild tricuspid regurgitation.  The right ventricular systolic pressure is calculated at 39 mmHg.   There is a small pericardial effusion.     Ejection Fractions  LVEF 55-60%  Cath:    Stress Test:    Cardiac Imaging:  Zio patch Decmber 2015: Sinus rhythm without significant abnormalities or arrhythmias     carotid ultrasound 5/25/2018   Right Carotid: Findings are consistent with less than 50% stenosis of the right  ICA. Laminar flow seen by color Doppler. Right external carotid artery appears  patent with no evidence of stenosis. The right vertebral artery is patent with  antegrade flow. No evidence of hemodynamically significant stenosis in the right  subclavian.  Left Carotid: Findings are consistent with less than 50% stenosis of  the left  ICA. Left external carotid artery appears patent with no evidence of stenosis.  The left vertebral artery is patent with antegrade flow.  No evidence of hemodynamically significant stenosis in the left subclavian.    Assessment/Plan   Very pleasant 78-year-old female who has a history of syncopal episodes and hypertension, she continues to do well from a cardiac standpoint. Heart rate and blood pressure is well controlled today. She was encouraged to keep up her activity.      Plan   -call with any questions   -follow up in May   -monitor blood pressure at home   -currently taking the Lasix as needed   -continue Atorvastatin   -Continue Amlodipine 5 mg twice a day     Francie Duff, APRN-CNP

## 2023-12-13 ENCOUNTER — APPOINTMENT (OUTPATIENT)
Dept: NEUROLOGY | Facility: CLINIC | Age: 78
End: 2023-12-13
Payer: MEDICARE

## 2024-01-15 ENCOUNTER — APPOINTMENT (OUTPATIENT)
Dept: RHEUMATOLOGY | Facility: CLINIC | Age: 79
End: 2024-01-15
Payer: MEDICARE

## 2024-01-31 ENCOUNTER — OFFICE VISIT (OUTPATIENT)
Dept: NEUROLOGY | Facility: CLINIC | Age: 79
End: 2024-01-31
Payer: MEDICARE

## 2024-01-31 VITALS
DIASTOLIC BLOOD PRESSURE: 76 MMHG | BODY MASS INDEX: 39.85 KG/M2 | HEART RATE: 71 BPM | SYSTOLIC BLOOD PRESSURE: 140 MMHG | WEIGHT: 203 LBS | HEIGHT: 60 IN

## 2024-01-31 DIAGNOSIS — E66.01 CLASS 2 SEVERE OBESITY DUE TO EXCESS CALORIES WITH SERIOUS COMORBIDITY AND BODY MASS INDEX (BMI) OF 38.0 TO 38.9 IN ADULT (MULTI): ICD-10-CM

## 2024-01-31 DIAGNOSIS — R55 SYNCOPE, UNSPECIFIED SYNCOPE TYPE: Primary | ICD-10-CM

## 2024-01-31 DIAGNOSIS — H35.3211 EXUDATIVE AGE-RELATED MACULAR DEGENERATION, RIGHT EYE, WITH ACTIVE CHOROIDAL NEOVASCULARIZATION (MULTI): ICD-10-CM

## 2024-01-31 DIAGNOSIS — I72.9 ANEURYSM (CMS-HCC): ICD-10-CM

## 2024-01-31 DIAGNOSIS — G40.009 PARTIAL IDIOPATHIC EPILEPSY WITH SEIZURES OF LOCALIZED ONSET, NOT INTRACTABLE, WITHOUT STATUS EPILEPTICUS (MULTI): ICD-10-CM

## 2024-01-31 DIAGNOSIS — M05.711 RHEUMATOID ARTHRITIS INVOLVING RIGHT SHOULDER WITH POSITIVE RHEUMATOID FACTOR (MULTI): ICD-10-CM

## 2024-01-31 DIAGNOSIS — J96.01 ACUTE RESPIRATORY FAILURE WITH HYPOXIA (MULTI): ICD-10-CM

## 2024-01-31 PROCEDURE — 1159F MED LIST DOCD IN RCRD: CPT | Performed by: PSYCHIATRY & NEUROLOGY

## 2024-01-31 PROCEDURE — 3077F SYST BP >= 140 MM HG: CPT | Performed by: PSYCHIATRY & NEUROLOGY

## 2024-01-31 PROCEDURE — 3078F DIAST BP <80 MM HG: CPT | Performed by: PSYCHIATRY & NEUROLOGY

## 2024-01-31 PROCEDURE — 99214 OFFICE O/P EST MOD 30 MIN: CPT | Performed by: PSYCHIATRY & NEUROLOGY

## 2024-01-31 PROCEDURE — 1157F ADVNC CARE PLAN IN RCRD: CPT | Performed by: PSYCHIATRY & NEUROLOGY

## 2024-01-31 PROCEDURE — 1126F AMNT PAIN NOTED NONE PRSNT: CPT | Performed by: PSYCHIATRY & NEUROLOGY

## 2024-01-31 PROCEDURE — 1036F TOBACCO NON-USER: CPT | Performed by: PSYCHIATRY & NEUROLOGY

## 2024-01-31 ASSESSMENT — ENCOUNTER SYMPTOMS
DIFFICULTY URINATING: 0
WOUND: 0
CHOKING: 0
CONFUSION: 1
SLEEP DISTURBANCE: 0
NAUSEA: 0
SEIZURES: 0
NERVOUS/ANXIOUS: 1
EYE PAIN: 0
ABDOMINAL PAIN: 0
JOINT SWELLING: 0
SINUS PAIN: 0
VOICE CHANGE: 0
FEVER: 0
LIGHT-HEADEDNESS: 0
STRIDOR: 0
FATIGUE: 0
ABDOMINAL DISTENTION: 0
PALPITATIONS: 0
HEADACHES: 0
ARTHRALGIAS: 0
HALLUCINATIONS: 0
BACK PAIN: 0
CHILLS: 0
SINUS PRESSURE: 0
ADENOPATHY: 0
DYSPHORIC MOOD: 0
FLANK PAIN: 0
DECREASED CONCENTRATION: 0
EYE REDNESS: 0
COLOR CHANGE: 0
APNEA: 0
ACTIVITY CHANGE: 0
EYE DISCHARGE: 0
DIZZINESS: 0
APPETITE CHANGE: 0
FACIAL ASYMMETRY: 0
VOMITING: 0
SORE THROAT: 0
FREQUENCY: 0
HYPERACTIVE: 0
NUMBNESS: 0
BLOOD IN STOOL: 0
DIAPHORESIS: 0
DYSURIA: 0
AGITATION: 0
POLYDIPSIA: 0
SHORTNESS OF BREATH: 0
BRUISES/BLEEDS EASILY: 0
DIARRHEA: 0
NECK STIFFNESS: 0
COUGH: 0
CONSTIPATION: 0
MYALGIAS: 0
NECK PAIN: 0
EYE ITCHING: 0
WHEEZING: 0
ANAL BLEEDING: 0
RECTAL PAIN: 0
CHEST TIGHTNESS: 0
TROUBLE SWALLOWING: 0
SPEECH DIFFICULTY: 0

## 2024-01-31 ASSESSMENT — VISUAL ACUITY: VA_NORMAL: 1

## 2024-01-31 NOTE — PROGRESS NOTES
Consults    History Of Present Illness  Mariajose Haq is a 78 y.o. female presenting with seizures, gait disorder, joint pain. She is under more mental stress because her daughter wants more financial support and not making enough money and live [place].     She had two episodes passed out. First time she was in [facility] and passed out, Ems was called and then she got up and started walking right away and started shopping again.Same thing happened again in [facility]. It happened around 2 PM.     She check her BP and its not in good control.     Past Medical and Surgical History    Past Medical History:   Diagnosis Date    Ataxia, unspecified     Ataxia    Other conditions influencing health status     Cognitive Functions Current Level Impaired Mild    Other conditions influencing health status     Screening for malignant neoplasms, colon    Other specified anxiety disorders     Depression with anxiety    Other specified anxiety disorders     Depression with anxiety    Pain in unspecified joint     Multiple joint pain    Personal history of other diseases of the circulatory system 09/27/2018    History of hypertension    Personal history of other diseases of the nervous system and sense organs     History of sleep apnea    Personal history of other specified conditions     History of fatigue    Umbilical hernia without obstruction or gangrene     Umbilical hernia    Unspecified cataract     Cataract of both eyes    Unspecified convulsions (CMS/HCC)     Seizure    Unspecified convulsions (CMS/HCC)     Convulsive disorder          Past Surgical History:   Procedure Laterality Date    CATARACT EXTRACTION  07/17/2013    Cataract Surgery    COLONOSCOPY  03/11/2013    Complete Colonoscopy    CT ANGIO NECK  5/22/2020    CT NECK ANGIO W AND WO IV CONTRAST 5/22/2020 [facility]    CT HEAD ANGIO W AND WO IV CONTRAST  1/6/2016    CT HEAD ANGIO W AND WO IV CONTRAST 1/6/2016 [facility]    CT HEAD ANGIO W  AND WO IV CONTRAST  3/1/2016    CT HEAD ANGIO W AND WO IV CONTRAST 3/1/2016 [facility]    CT HEAD ANGIO W AND WO IV CONTRAST  5/22/2020    CT HEAD ANGIO W AND WO IV CONTRAST 5/22/2020 [facility]    OTHER SURGICAL HISTORY  03/07/2013    Excision Of Lesion Scalp    OTHER SURGICAL HISTORY  06/08/2016    Intracranial Aneurysm Repair    OTHER SURGICAL HISTORY  07/21/2020    Colonoscopy complete for polypectomy    OTHER SURGICAL HISTORY  05/12/2020    Cyst excision    OTHER SURGICAL HISTORY  05/12/2020    [place] tooth extraction        Social History  Social History     Tobacco Use    Smoking status: Never    Smokeless tobacco: Never   Substance Use Topics    Alcohol use: Not Currently    Drug use: Never     Allergies  Clobazam, Lamotrigine, Levetiracetam, Topiramate, and Penicillins  (Not in a hospital admission)      Review of Systems   Constitutional:  Negative for activity change, appetite change, chills, diaphoresis, fatigue and fever.   HENT:  Negative for congestion, dental problem, drooling, ear discharge, sinus pressure, sinus pain, sneezing, sore throat, tinnitus, trouble swallowing and voice change.    Eyes:  Negative for pain, discharge, redness and itching.   Respiratory:  Negative for apnea, cough, choking, chest tightness, shortness of breath, wheezing and stridor.    Cardiovascular:  Negative for chest pain, palpitations and leg swelling.   Gastrointestinal:  Negative for abdominal distention, abdominal pain, anal bleeding, blood in stool, constipation, diarrhea, nausea, rectal pain and vomiting.   Endocrine: Negative for cold intolerance, heat intolerance and polydipsia.   Genitourinary:  Negative for difficulty urinating, dysuria, enuresis, flank pain, frequency and genital sores.   Musculoskeletal:  Negative for arthralgias, back pain, gait problem, joint swelling, myalgias, neck pain and neck stiffness.   Skin:  Negative for color change, pallor, rash and wound.    Allergic/Immunologic: Negative for environmental allergies, food allergies and immunocompromised state.   Neurological:  Negative for dizziness, seizures, facial asymmetry, speech difficulty, light-headedness, numbness and headaches.   Hematological:  Negative for adenopathy. Does not bruise/bleed easily.   Psychiatric/Behavioral:  Positive for behavioral problems and confusion. Negative for agitation, decreased concentration, dysphoric mood, hallucinations, self-injury, sleep disturbance and suicidal ideas. The patient is nervous/anxious. The patient is not hyperactive.          Cardiovascular system: S1-S2 intact  Respiratory clear to auscultation bilateral on deep breathing he feels irritability on the left side of the chest.    Neurological Exam  Mental Status  Awake, alert and oriented to person, place and time. Recent and remote memory are intact. Able to copy figure. Clock drawing is normal. Mild dysarthria present. Psychogenic chronic and stable. Able to name objects, name parts of objects, repeat, read and write. Follows three-step commands. Able to perform serial calculations. Able to spell words backwards. Fund of knowledge is appropriate for level of education.    Cranial Nerves  CN II: Visual acuity is normal. Right funduscopic exam: disc intact. Left funduscopic exam: disc intact.  CN III, IV, VI: Extraocular movements intact bilaterally. Normal lids and orbits bilaterally.   Right pupil: Round. Reactive to light. Reactive to accommodation.  CN V:  Right: Facial sensation is normal.  Left: Facial sensation is normal on the left.  CN VII: Full and symmetric facial movement.  CN VIII:  Right: Hearing is normal.  Left: Hearing is normal.  CN IX, X:  Right: Palate is normal.  Left: Palate is normal.  CN XI:  Right: Sternocleidomastoid strength is normal.  Left: Sternocleidomastoid strength is normal.  CN XII: Tongue midline without atrophy or fasciculations.    Motor  Normal muscle bulk throughout. No  fasciculations present. Normal muscle tone. No abnormal involuntary movements. Strength is 5/5 throughout all four extremities.    Sensory  Light touch is normal in upper and lower extremities. Pinprick is normal in upper and lower extremities. Temperature is normal in upper and lower extremities. Vibration is normal in upper and lower extremities.     Reflexes  Deep tendon reflexes are 2+ and symmetric in all four extremities.  Jaw jerk absent.  Right pathological reflexes: Natalie's absent.  Left pathological reflexes: Natalie's absent.  Glabellar tap absent. Snout absent. Right palmomental absent. Left palmomental absent. Right palmar grasp absent. Left palmar grasp absent.    Coordination  Right: Finger-to-nose normal. Rapid alternating movement normal. Heel-to-shin normal.Left: Finger-to-nose normal. Rapid alternating movement normal. Heel-to-shin normal.    Gait  Normal casual, toe, heel and tandem gait.        Last Recorded Vitals  Blood pressure 140/76, pulse 71, height 1.524 m (5'), weight 92.1 kg (203 lb).    Relevant Results      Current Outpatient Medications:     amLODIPine (Norvasc) 5 mg tablet, Take 1 tablet (5 mg) by mouth every 12 hours., Disp: 180 tablet, Rfl: 3    antiox.mv no.10/omeg3s/lut/day (I-CAPS ORAL), Take by mouth. As directed, Disp: , Rfl:     atorvastatin (Lipitor) 40 mg tablet, Take 1 tablet (40 mg) by mouth once daily., Disp: 90 tablet, Rfl: 3    B2/vits A,C,E/lut/zeaxanth/min (ICAPS ORAL), Take as directed, Disp: , Rfl:     cholecalciferol (Vitamin D-3) 50 mcg (2,000 unit) capsule, Take 1 capsule (50 mcg) by mouth once daily., Disp: , Rfl:     cyanocobalamin (Vitamin B-12) 100 mcg tablet, Take 1 tablet (100 mcg) by mouth once daily., Disp: , Rfl:     folic acid (Folvite) 1 mg tablet, Take 1 tablet (1 mg) by mouth once daily., Disp: , Rfl:     furosemide (Lasix) 40 mg tablet, TAKE 1 TABLET BY MOUTH EVERY DAY AS NEEDED, Disp: 30 tablet, Rfl: 3    hydroxychloroquine (Plaquenil) 200 mg  tablet, Take 1 tablet (200 mg) by mouth 2 times a day with meals., Disp: , Rfl:     methotrexate (Trexall) 2.5 mg tablet, Take 5 tablets (12.5 mg total) by mouth 1 (one) time per week., Disp: , Rfl:     sertraline (Zoloft) 100 mg tablet, TAKE 1 TABLET ONCE DAILY., Disp: 90 tablet, Rfl: 3    carvedilol (Coreg) 25 mg tablet, Take 0.5 tablets (12.5 mg) by mouth every 12 hours., Disp: , Rfl:     ciprofloxacin (Cipro) 500 mg tablet, Take 1 tablet (500 mg) by mouth 2 times a day., Disp: , Rfl:     diclofenac sodium 1 % kit, Apply topically 4 times a day., Disp: , Rfl:     doxycycline (Vibramycin) 100 mg capsule, Take 1 capsule (100 mg) by mouth every 12 hours., Disp: , Rfl:     estrogens, conjugated, (Premarin) vaginal cream, Insert 0.5 Applicator bedtime 0.5 g per vagina 2 nights a week, Disp: , Rfl:     famciclovir (Famvir) 500 mg tablet, Take 1 tablet (500 mg) by mouth 2 times a day as needed (cold sores)., Disp: , Rfl:     guaiFENesin (Mucinex) 600 mg 12 hr tablet, Take 1-2 tablets (600-1,200 mg) by mouth 2 times a day as needed for congestion., Disp: , Rfl:     ibuprofen 200 mg tablet, Take 1 tablet (200 mg) by mouth every 8 hours., Disp: , Rfl:     ibuprofen-diphenhydramine cit 200-38 mg tablet per tablet, Take 2 tablets by mouth once daily at bedtime., Disp: , Rfl:     levETIRAcetam (Keppra) 500 mg tablet, Take 1 tablet (500 mg) by mouth 2 times a day., Disp: , Rfl:     magnesium oxide (Mag-Ox) 250 mg magnesium tablet, Take 1 tablet (250 mg) by mouth. Once or twice daily to relax muscles, Disp: , Rfl:     meclizine (Antivert) 25 mg tablet, Take 1 tablet (25 mg) by mouth 3 times a day as needed., Disp: , Rfl:     nitrofurantoin, macrocrystal-monohydrate, (Macrobid) 100 mg capsule, Take 1 capsule (100 mg) by mouth 2 times a day after meals., Disp: , Rfl:     nystatin (Mycostatin) cream, Apply topically 2 times a day., Disp: , Rfl:     omeprazole OTC (PriLOSEC OTC) 20 mg EC tablet, Take 1 tablet (20 mg) by mouth 2  times a day., Disp: , Rfl:     OXcarbazepine (Trileptal) 300 mg tablet, Take 3.5 tablets (1,050 mg) by mouth 2 times a day., Disp: , Rfl:     oxybutynin (Ditropan) 5 mg tablet, Take 1 tablet (5 mg) by mouth 2 times a day., Disp: , Rfl:     polyethylene glycol-electrolytes (TriLyte With Flavor Packets) 420 gram solution, Take by mouth.  Drink 2 L (2,000 mL) at 500 pm the night prior to the procedure then drink the other 2 L (2,000 mL) 5 hours prior to procedure time., Disp: , Rfl:     potassium chloride CR 20 mEq ER tablet, Take 1 tablet (20 mEq) by mouth once daily. Do not crush or chew., Disp: , Rfl:     triamcinolone (Kenalog) 0.1 % cream, Apply 1 Film topically 2 times a day., Disp: , Rfl:     trospium (Sanctura) 20 mg tablet, Take 1 tablet (20 mg) by mouth 2 times a day., Disp: , Rfl:     vitamins A,C,E-zinc-copper (ICaps AREDS) 4,296 mcg-226 mg-90 mg capsule, Take by mouth. As directed, Disp: , Rfl:      Continuous medications    PRN medications, reviewed                                        I have personally reviewed the following imaging for brain (CT/ MR) and results and discussed in detail with the patient/care giver/family     No results found.     Imaging Brain/Head  EXAMINATION:  MRI brain  CLINICAL HISTORY:  Signs/Symptoms: temporal lobe epilepsy  FINDINGS:  The brain was studied in the sagittal axial and coronal planes  utilizing FLAIR, T1 and T2 weighted images. Comparison is made to a  previous examination dated April 2010.    There has been interval development of lacunar infarction in the left  centrum semiovale since the previous exam. There is slight prominece  of the cortical sulci and sylvian fissures. There is mild ventricular  dilatation.  There are a few scattered foci of abnormal signal within  the basal ganglia and subcortical white matter bilaterally.  These  are compatible with minimal small vessel ischemic changes.  The  largest of these is identified in the subcortical white matter  along  the interhemispheric surface of the right frontal lobe and best  appreciated on FLAIR axial image 12/34. It is unchanged compared to  the previous study.    The visualized skull base paranasal sinuses and orbital structures  are unremarkable.Diffusion weighted images and associated ADC maps of  the brain were unremarkable.  There is no evidence of diffusion  restriction to suggest the presence of infarction.Thin section  sagittal and coronal T1 weighted images of the brain were also  performed with 3-D reconstruction in multiple planes .  There is no  evidence of neuronal migrational abnormality or heterotopic gray  matter.  The temporal horns and temporal lobe gray matter are  unremarkable.Following contrast injection, 15 cc Multihance is no  abnormal enhancement.  1. Interval lacunar infarction in the left subinsular white matter  and centrum semiovale. No associated enhancement or diffusion  restriction      2. The amygdala and hippocampus are normal and symmetrical. There is  slight asymmetry with minimal prominence of the right temporal horn;  unchanged, compared to the previous study.  3.  Mild parenchymal volume loss with minimal small vessel ischemic  changeDiagnostic interpretation  was performed on the campus of [facility].      Imaging Cervical  No results found for this or any previous visit.    Imaging Thoracic  No results found for this or any previous visit.    Imaging Lumbar  MRI scan of the lumbar spine without gadolinium 24 2014    History: Back pain, status post fall    Technique: Multiplanar multisequence MRI scan of the lumbar spine was  obtained including STIR imaging.    Findings: The L5 vertebral body is noted to be partially sacralized.  Vertebral height is well-maintained.    L5-S1: There is no significant central canal or neural foraminal  stenosis.    L4-L5: There is grade 1 spondylolisthesis. There are severe  hypertrophic facet changes. There is a medially  projecting left  synovial cyst which measures approximately 2.6 mm in diameter. There  is mild to moderate central canal stenosis. The neural foramina are  patent.    L3-L4: There are moderate hypertrophic facet changes on the left.  There is diffuse disc bulge. There is mild central canal stenosis.  The neural foramina are patent.    L2-L3: There is mild diffuse disc bulge without significant canal or  foraminal stenosis.    L1-L2: There is mild diffuse disc bulge without significant canal or  foraminal stenosis.    T12-L1: There discogenic changes in the adjacent endplates. There is  mild diffuse disc bulge without significant canal or foraminal  stenosis.    Impression:  1. Partial sacralization of the L5 vertebral body  2. Grade 1 spondylolisthesis at the L4-L5 level  3. Left-sided synovial cyst at the L4-L5 level      4. Multifocal degenerative changes as described above.              Assessment/Plan     Seizures are in good control    Syncope events x 2.     Meds for BP needs to be readjusted. Please get BP/P records so that PCP may readjust her anti HTNisve meds.    I will get tilt tvaole test for now so rule out dysautonomia vs POTS at this point.     Patient/Family Education: Extensive time was spent educating the patient on relevant anatomy, clinical findings and imaging, as well as discussing the potential diagnoses as discussed above.  Pharmacology: as above. Exercise: I discussed the importance of maintaining a daily exercise program, including stretching and strengthening. Preventative strategies were reviewed, specifically avoidance of any exercises that exacerbate pain.Return to online virtual visit/ clinic visit for follow-up with KEV Richardson in 6 weeks  or sooner as needed.The patient expressed understanding and agreement with the assessment and plan.  Patient encouraged to contact us should they have any questions, concerns, or any changes in symptoms. Thank you for allowing me to participate in  the care of your patient.** This note is created using speech recognition transcription software. Despite proofreading, several typographical errors might be present that might affect the meaning of the content. Please call with any questions.**          Kashif Pelaez MD

## 2024-02-06 DIAGNOSIS — R56.9 SEIZURE (MULTI): Primary | ICD-10-CM

## 2024-02-06 RX ORDER — OXCARBAZEPINE 300 MG/1
1050 TABLET, FILM COATED ORAL 2 TIMES DAILY
Qty: 420 TABLET | Refills: 11 | Status: SHIPPED | OUTPATIENT
Start: 2024-02-06

## 2024-02-08 DIAGNOSIS — M06.9 RHEUMATOID ARTHRITIS, INVOLVING UNSPECIFIED SITE, UNSPECIFIED WHETHER RHEUMATOID FACTOR PRESENT (MULTI): Primary | ICD-10-CM

## 2024-02-09 RX ORDER — FOLIC ACID 1 MG/1
1 TABLET ORAL DAILY
Qty: 90 TABLET | Refills: 3 | Status: SHIPPED | OUTPATIENT
Start: 2024-02-09 | End: 2025-02-08

## 2024-02-15 ENCOUNTER — OFFICE VISIT (OUTPATIENT)
Dept: RHEUMATOLOGY | Facility: CLINIC | Age: 79
End: 2024-02-15
Payer: MEDICARE

## 2024-02-15 ENCOUNTER — LAB (OUTPATIENT)
Dept: LAB | Facility: LAB | Age: 79
End: 2024-02-15
Payer: MEDICARE

## 2024-02-15 VITALS
WEIGHT: 215 LBS | DIASTOLIC BLOOD PRESSURE: 76 MMHG | HEIGHT: 64 IN | SYSTOLIC BLOOD PRESSURE: 158 MMHG | HEART RATE: 64 BPM | BODY MASS INDEX: 36.7 KG/M2

## 2024-02-15 DIAGNOSIS — Z79.899 ENCOUNTER FOR LONG-TERM CURRENT USE OF HIGH RISK MEDICATION: ICD-10-CM

## 2024-02-15 DIAGNOSIS — M06.9 RHEUMATOID ARTHRITIS, INVOLVING UNSPECIFIED SITE, UNSPECIFIED WHETHER RHEUMATOID FACTOR PRESENT (MULTI): ICD-10-CM

## 2024-02-15 DIAGNOSIS — M06.9 RHEUMATOID ARTHRITIS, INVOLVING UNSPECIFIED SITE, UNSPECIFIED WHETHER RHEUMATOID FACTOR PRESENT (MULTI): Primary | ICD-10-CM

## 2024-02-15 LAB
ALBUMIN SERPL BCP-MCNC: 3.7 G/DL (ref 3.4–5)
ALP SERPL-CCNC: 159 U/L (ref 33–136)
ALT SERPL W P-5'-P-CCNC: 19 U/L (ref 7–45)
ANION GAP SERPL CALC-SCNC: 14 MMOL/L (ref 10–20)
AST SERPL W P-5'-P-CCNC: 32 U/L (ref 9–39)
BILIRUB SERPL-MCNC: 0.3 MG/DL (ref 0–1.2)
BUN SERPL-MCNC: 13 MG/DL (ref 6–23)
CALCIUM SERPL-MCNC: 8.4 MG/DL (ref 8.6–10.3)
CHLORIDE SERPL-SCNC: 98 MMOL/L (ref 98–107)
CO2 SERPL-SCNC: 29 MMOL/L (ref 21–32)
CREAT SERPL-MCNC: 0.78 MG/DL (ref 0.5–1.05)
CRP SERPL-MCNC: 0.67 MG/DL
EGFRCR SERPLBLD CKD-EPI 2021: 78 ML/MIN/1.73M*2
ERYTHROCYTE [DISTWIDTH] IN BLOOD BY AUTOMATED COUNT: 14 % (ref 11.5–14.5)
ERYTHROCYTE [SEDIMENTATION RATE] IN BLOOD BY WESTERGREN METHOD: 10 MM/H (ref 0–30)
GLUCOSE SERPL-MCNC: 81 MG/DL (ref 74–99)
HCT VFR BLD AUTO: 35.9 % (ref 36–46)
HGB BLD-MCNC: 12 G/DL (ref 12–16)
MCH RBC QN AUTO: 32.2 PG (ref 26–34)
MCHC RBC AUTO-ENTMCNC: 33.4 G/DL (ref 32–36)
MCV RBC AUTO: 96 FL (ref 80–100)
NRBC BLD-RTO: 0 /100 WBCS (ref 0–0)
PLATELET # BLD AUTO: 176 X10*3/UL (ref 150–450)
POTASSIUM SERPL-SCNC: 5.3 MMOL/L (ref 3.5–5.3)
PROT SERPL-MCNC: 5.9 G/DL (ref 6.4–8.2)
RBC # BLD AUTO: 3.73 X10*6/UL (ref 4–5.2)
SODIUM SERPL-SCNC: 136 MMOL/L (ref 136–145)
WBC # BLD AUTO: 6.5 X10*3/UL (ref 4.4–11.3)

## 2024-02-15 PROCEDURE — 80053 COMPREHEN METABOLIC PANEL: CPT

## 2024-02-15 PROCEDURE — 1157F ADVNC CARE PLAN IN RCRD: CPT | Performed by: INTERNAL MEDICINE

## 2024-02-15 PROCEDURE — 3077F SYST BP >= 140 MM HG: CPT | Performed by: INTERNAL MEDICINE

## 2024-02-15 PROCEDURE — 85652 RBC SED RATE AUTOMATED: CPT

## 2024-02-15 PROCEDURE — 1126F AMNT PAIN NOTED NONE PRSNT: CPT | Performed by: INTERNAL MEDICINE

## 2024-02-15 PROCEDURE — 1036F TOBACCO NON-USER: CPT | Performed by: INTERNAL MEDICINE

## 2024-02-15 PROCEDURE — 36415 COLL VENOUS BLD VENIPUNCTURE: CPT

## 2024-02-15 PROCEDURE — 1159F MED LIST DOCD IN RCRD: CPT | Performed by: INTERNAL MEDICINE

## 2024-02-15 PROCEDURE — 86140 C-REACTIVE PROTEIN: CPT

## 2024-02-15 PROCEDURE — 99213 OFFICE O/P EST LOW 20 MIN: CPT | Performed by: INTERNAL MEDICINE

## 2024-02-15 PROCEDURE — 3078F DIAST BP <80 MM HG: CPT | Performed by: INTERNAL MEDICINE

## 2024-02-15 PROCEDURE — 85027 COMPLETE CBC AUTOMATED: CPT

## 2024-02-15 ASSESSMENT — ENCOUNTER SYMPTOMS
SEIZURES: 1
COUGH: 1
DIZZINESS: 1
FATIGUE: 1

## 2024-02-15 NOTE — PROGRESS NOTES
Subjective   Patient ID: Mariajose Haq is a 78 y.o. female who presents for Follow-up (6 mo fuv).  HPI  Patient with history of rheumatoid arthritis and macular degeneration.    At her last visit on 7/17/2023 we had her continue with Plaquenil and methotrexate.    She still has episodes where she passes out.  This time happen at [facility].  She follows with neurology and cardiology. Neurology feels that she has is on too much medication.  She has autonomic testing in early March.    Not much swelling in her joints.  4th digit on the right triggers.    She mostly is on her computer at home.  Is on facebook and looks things up.  No rashes  The knees are doing ok.  The are crooked.  She walks slow  She rates her pain 1/10     She's not too stiff in the AM  Some nights she tosses and turns.  Review of Systems   Constitutional:  Positive for fatigue.   HENT:          Mucous   Respiratory:  Positive for cough.    Musculoskeletal:         Per HPI   Neurological:  Positive for dizziness, seizures and syncope.       Objective   Physical Exam  GEN: NAD A&O x3 , appropriate affect, antalgic gait, rollator  HEENT:no enlarged glands or thyroid, glasses  LYMPH: no cervical lymphadenopathy  SKIN: no rashes  PULSES: +radials   MSK:  Bilateral knee replacements   Scoliosis and kyphosis of the thoracic lumbar spine  Ulnar deviation of her bilateral hands with enlargement in between her second and third MCP on the right. No current tenderness small flexor tendon in the fourth digit on the right  no swelling in wrists's   Assessment/Plan     RA -  currently on methotrexate and Plaquenil. Appears stable.  Will have her do blood work      We will see her again in 6 months or as needed.        Aura Mckenzie MD 02/15/24 11:02 AM

## 2024-03-04 ENCOUNTER — APPOINTMENT (OUTPATIENT)
Dept: RADIOLOGY | Facility: HOSPITAL | Age: 79
End: 2024-03-04
Payer: MEDICARE

## 2024-03-04 ENCOUNTER — HOSPITAL ENCOUNTER (EMERGENCY)
Facility: HOSPITAL | Age: 79
Discharge: HOME | End: 2024-03-04
Attending: FAMILY MEDICINE
Payer: MEDICARE

## 2024-03-04 ENCOUNTER — APPOINTMENT (OUTPATIENT)
Dept: CARDIOLOGY | Facility: HOSPITAL | Age: 79
End: 2024-03-04
Payer: MEDICARE

## 2024-03-04 VITALS
OXYGEN SATURATION: 100 % | TEMPERATURE: 97.7 F | BODY MASS INDEX: 36.7 KG/M2 | SYSTOLIC BLOOD PRESSURE: 139 MMHG | RESPIRATION RATE: 18 BRPM | DIASTOLIC BLOOD PRESSURE: 69 MMHG | WEIGHT: 215 LBS | HEART RATE: 64 BPM | HEIGHT: 64 IN

## 2024-03-04 DIAGNOSIS — N39.0 URINARY TRACT INFECTION WITHOUT HEMATURIA, SITE UNSPECIFIED: ICD-10-CM

## 2024-03-04 DIAGNOSIS — R42 VERTIGO: ICD-10-CM

## 2024-03-04 DIAGNOSIS — R42 DIZZINESS: Primary | ICD-10-CM

## 2024-03-04 LAB
ALBUMIN SERPL BCP-MCNC: 3.6 G/DL (ref 3.4–5)
ALP SERPL-CCNC: 147 U/L (ref 33–136)
ALT SERPL W P-5'-P-CCNC: 17 U/L (ref 7–45)
ANION GAP SERPL CALC-SCNC: 13 MMOL/L (ref 10–20)
APPEARANCE UR: ABNORMAL
AST SERPL W P-5'-P-CCNC: 27 U/L (ref 9–39)
BACTERIA #/AREA URNS AUTO: ABNORMAL /HPF
BASOPHILS # BLD AUTO: 0.03 X10*3/UL (ref 0–0.1)
BASOPHILS NFR BLD AUTO: 0.7 %
BILIRUB SERPL-MCNC: 0.3 MG/DL (ref 0–1.2)
BILIRUB UR STRIP.AUTO-MCNC: NEGATIVE MG/DL
BUN SERPL-MCNC: 22 MG/DL (ref 6–23)
CALCIUM SERPL-MCNC: 8.3 MG/DL (ref 8.6–10.3)
CAOX CRY #/AREA UR COMP ASSIST: ABNORMAL /HPF
CARDIAC TROPONIN I PNL SERPL HS: 8 NG/L (ref 0–13)
CARDIAC TROPONIN I PNL SERPL HS: 8 NG/L (ref 0–13)
CHLORIDE SERPL-SCNC: 103 MMOL/L (ref 98–107)
CO2 SERPL-SCNC: 26 MMOL/L (ref 21–32)
COLOR UR: YELLOW
CREAT SERPL-MCNC: 0.73 MG/DL (ref 0.5–1.05)
EGFRCR SERPLBLD CKD-EPI 2021: 85 ML/MIN/1.73M*2
EOSINOPHIL # BLD AUTO: 0.07 X10*3/UL (ref 0–0.4)
EOSINOPHIL NFR BLD AUTO: 1.6 %
ERYTHROCYTE [DISTWIDTH] IN BLOOD BY AUTOMATED COUNT: 14.1 % (ref 11.5–14.5)
FLUAV RNA RESP QL NAA+PROBE: NOT DETECTED
FLUBV RNA RESP QL NAA+PROBE: NOT DETECTED
GLUCOSE SERPL-MCNC: 102 MG/DL (ref 74–99)
GLUCOSE UR STRIP.AUTO-MCNC: NEGATIVE MG/DL
HCT VFR BLD AUTO: 33.6 % (ref 36–46)
HGB BLD-MCNC: 11.2 G/DL (ref 12–16)
HYALINE CASTS #/AREA URNS AUTO: ABNORMAL /LPF
IMM GRANULOCYTES # BLD AUTO: 0.01 X10*3/UL (ref 0–0.5)
IMM GRANULOCYTES NFR BLD AUTO: 0.2 % (ref 0–0.9)
KETONES UR STRIP.AUTO-MCNC: NEGATIVE MG/DL
LACTATE SERPL-SCNC: 1.5 MMOL/L (ref 0.4–2)
LACTATE SERPL-SCNC: 2.5 MMOL/L (ref 0.4–2)
LEUKOCYTE ESTERASE UR QL STRIP.AUTO: ABNORMAL
LYMPHOCYTES # BLD AUTO: 1.09 X10*3/UL (ref 0.8–3)
LYMPHOCYTES NFR BLD AUTO: 25.4 %
MAGNESIUM SERPL-MCNC: 2.01 MG/DL (ref 1.6–2.4)
MCH RBC QN AUTO: 32.4 PG (ref 26–34)
MCHC RBC AUTO-ENTMCNC: 33.3 G/DL (ref 32–36)
MCV RBC AUTO: 97 FL (ref 80–100)
MONOCYTES # BLD AUTO: 0.61 X10*3/UL (ref 0.05–0.8)
MONOCYTES NFR BLD AUTO: 14.2 %
MUCOUS THREADS #/AREA URNS AUTO: ABNORMAL /LPF
NEUTROPHILS # BLD AUTO: 2.48 X10*3/UL (ref 1.6–5.5)
NEUTROPHILS NFR BLD AUTO: 57.9 %
NITRITE UR QL STRIP.AUTO: POSITIVE
NRBC BLD-RTO: 0 /100 WBCS (ref 0–0)
PH UR STRIP.AUTO: 5 [PH]
PLATELET # BLD AUTO: 166 X10*3/UL (ref 150–450)
POTASSIUM SERPL-SCNC: 4.3 MMOL/L (ref 3.5–5.3)
PROT SERPL-MCNC: 6 G/DL (ref 6.4–8.2)
PROT UR STRIP.AUTO-MCNC: NEGATIVE MG/DL
RBC # BLD AUTO: 3.46 X10*6/UL (ref 4–5.2)
RBC # UR STRIP.AUTO: NEGATIVE /UL
RBC #/AREA URNS AUTO: ABNORMAL /HPF
SARS-COV-2 RNA RESP QL NAA+PROBE: NOT DETECTED
SODIUM SERPL-SCNC: 138 MMOL/L (ref 136–145)
SP GR UR STRIP.AUTO: 1.02
TRANS CELLS #/AREA UR COMP ASSIST: ABNORMAL /HPF
UROBILINOGEN UR STRIP.AUTO-MCNC: <2 MG/DL
WBC # BLD AUTO: 4.3 X10*3/UL (ref 4.4–11.3)
WBC #/AREA URNS AUTO: ABNORMAL /HPF
WBC CLUMPS #/AREA URNS AUTO: ABNORMAL /HPF

## 2024-03-04 PROCEDURE — 36415 COLL VENOUS BLD VENIPUNCTURE: CPT | Performed by: FAMILY MEDICINE

## 2024-03-04 PROCEDURE — 71045 X-RAY EXAM CHEST 1 VIEW: CPT | Performed by: RADIOLOGY

## 2024-03-04 PROCEDURE — 2500000001 HC RX 250 WO HCPCS SELF ADMINISTERED DRUGS (ALT 637 FOR MEDICARE OP)

## 2024-03-04 PROCEDURE — 93005 ELECTROCARDIOGRAM TRACING: CPT

## 2024-03-04 PROCEDURE — 84484 ASSAY OF TROPONIN QUANT: CPT | Performed by: FAMILY MEDICINE

## 2024-03-04 PROCEDURE — 83735 ASSAY OF MAGNESIUM: CPT | Performed by: FAMILY MEDICINE

## 2024-03-04 PROCEDURE — 2500000004 HC RX 250 GENERAL PHARMACY W/ HCPCS (ALT 636 FOR OP/ED)

## 2024-03-04 PROCEDURE — 99285 EMERGENCY DEPT VISIT HI MDM: CPT | Mod: 25

## 2024-03-04 PROCEDURE — 71045 X-RAY EXAM CHEST 1 VIEW: CPT

## 2024-03-04 PROCEDURE — 83605 ASSAY OF LACTIC ACID: CPT | Performed by: FAMILY MEDICINE

## 2024-03-04 PROCEDURE — 94760 N-INVAS EAR/PLS OXIMETRY 1: CPT

## 2024-03-04 PROCEDURE — 2500000004 HC RX 250 GENERAL PHARMACY W/ HCPCS (ALT 636 FOR OP/ED): Performed by: FAMILY MEDICINE

## 2024-03-04 PROCEDURE — 87086 URINE CULTURE/COLONY COUNT: CPT | Mod: GENLAB | Performed by: FAMILY MEDICINE

## 2024-03-04 PROCEDURE — 96361 HYDRATE IV INFUSION ADD-ON: CPT

## 2024-03-04 PROCEDURE — 70450 CT HEAD/BRAIN W/O DYE: CPT

## 2024-03-04 PROCEDURE — 85025 COMPLETE CBC W/AUTO DIFF WBC: CPT | Performed by: FAMILY MEDICINE

## 2024-03-04 PROCEDURE — 87636 SARSCOV2 & INF A&B AMP PRB: CPT | Performed by: FAMILY MEDICINE

## 2024-03-04 PROCEDURE — 70450 CT HEAD/BRAIN W/O DYE: CPT | Performed by: RADIOLOGY

## 2024-03-04 PROCEDURE — 81001 URINALYSIS AUTO W/SCOPE: CPT | Performed by: FAMILY MEDICINE

## 2024-03-04 PROCEDURE — 96365 THER/PROPH/DIAG IV INF INIT: CPT

## 2024-03-04 PROCEDURE — 80053 COMPREHEN METABOLIC PANEL: CPT | Performed by: FAMILY MEDICINE

## 2024-03-04 RX ORDER — SULFAMETHOXAZOLE AND TRIMETHOPRIM 800; 160 MG/1; MG/1
1 TABLET ORAL 2 TIMES DAILY
Qty: 14 TABLET | Refills: 0 | Status: SHIPPED | OUTPATIENT
Start: 2024-03-04 | End: 2024-03-11

## 2024-03-04 RX ORDER — CEFTRIAXONE 1 G/50ML
INJECTION, SOLUTION INTRAVENOUS
Status: COMPLETED
Start: 2024-03-04 | End: 2024-03-04

## 2024-03-04 RX ORDER — MECLIZINE HCL 12.5 MG 12.5 MG/1
TABLET ORAL
Status: COMPLETED
Start: 2024-03-04 | End: 2024-03-04

## 2024-03-04 RX ORDER — CEFTRIAXONE 1 G/50ML
1 INJECTION, SOLUTION INTRAVENOUS ONCE
Status: COMPLETED | OUTPATIENT
Start: 2024-03-04 | End: 2024-03-04

## 2024-03-04 RX ORDER — MECLIZINE HCL 12.5 MG 12.5 MG/1
25 TABLET ORAL ONCE
Status: COMPLETED | OUTPATIENT
Start: 2024-03-04 | End: 2024-03-04

## 2024-03-04 RX ADMIN — CEFTRIAXONE 1 G: 1 INJECTION, SOLUTION INTRAVENOUS at 12:54

## 2024-03-04 RX ADMIN — MECLIZINE HYDROCHLORIDE 25 MG: 12.5 TABLET ORAL at 12:55

## 2024-03-04 RX ADMIN — MECLIZINE HCL 12.5 MG 25 MG: 12.5 TABLET ORAL at 12:55

## 2024-03-04 RX ADMIN — SODIUM CHLORIDE 500 ML: 9 INJECTION, SOLUTION INTRAVENOUS at 12:55

## 2024-03-04 ASSESSMENT — COLUMBIA-SUICIDE SEVERITY RATING SCALE - C-SSRS
2. HAVE YOU ACTUALLY HAD ANY THOUGHTS OF KILLING YOURSELF?: NO
6. HAVE YOU EVER DONE ANYTHING, STARTED TO DO ANYTHING, OR PREPARED TO DO ANYTHING TO END YOUR LIFE?: NO
1. IN THE PAST MONTH, HAVE YOU WISHED YOU WERE DEAD OR WISHED YOU COULD GO TO SLEEP AND NOT WAKE UP?: NO

## 2024-03-04 ASSESSMENT — PAIN SCALES - GENERAL: PAINLEVEL_OUTOF10: 0 - NO PAIN

## 2024-03-04 ASSESSMENT — PAIN - FUNCTIONAL ASSESSMENT: PAIN_FUNCTIONAL_ASSESSMENT: 0-10

## 2024-03-04 NOTE — ED PROVIDER NOTES
"HPI   Chief Complaint   Patient presents with    Dizziness     Pt states she got dizzy and fell when walking at home, denies hitting head or loc, states it feels like the room is spinning, has \"black outs\" and being  treated for seizure disorder; has a procedure/test scheduled  for Wed and asked to stop taking most medications today but did take anti-seizure meds       HPI  This 78 years old very nice female patient who has history of chronic dizziness and vertigo problem has been on meclizine waiting for some cardiac testing at main campus was told not to take meclizine start experiencing dizziness again with spinning of surrounding and decided come to ER for evaluation she Was similar to symptoms she had in the past however she is not taking meclizine because of pending test.  She also has history of seizure disorder and is scheduled for some chronic testing on Wednesday.  She did not take meclizine however took her seizure medicine as directed by her treating physician.  She denies any chest pain or chest tightness or pain in arms or jaw cold sweat blacked out or pass out.  Denies any difficulty moving arms or legs or weakness arms legs income stool or urine.  She denies fall hitting any object.  Also denies any trouble urinating or dysuria frequency urgency.  Denies incontinence.  She repeatedly denies any chest pain or chest tightness or palpitation.  She had multiple CT angio of the head and neck done in the past regarding her record.    Family history: Reviewed   Family social history: Reviewed, denies substance abuse.    Review of system: 10 review of system obtained review of system as In HPI otherwise negative                 Ana Coma Scale Score: 15                     Patient History   Past Medical History:   Diagnosis Date    Ataxia, unspecified     Ataxia    Other conditions influencing health status     Cognitive Functions Current Level Impaired Mild    Other conditions influencing health status  "    Screening for malignant neoplasms, colon    Other specified anxiety disorders     Depression with anxiety    Other specified anxiety disorders     Depression with anxiety    Pain in unspecified joint     Multiple joint pain    Personal history of other diseases of the circulatory system 09/27/2018    History of hypertension    Personal history of other diseases of the nervous system and sense organs     History of sleep apnea    Personal history of other specified conditions     History of fatigue    Umbilical hernia without obstruction or gangrene     Umbilical hernia    Unspecified cataract     Cataract of both eyes    Unspecified convulsions (CMS/HCC)     Seizure    Unspecified convulsions (CMS/HCC)     Convulsive disorder     Past Surgical History:   Procedure Laterality Date    CATARACT EXTRACTION  07/17/2013    Cataract Surgery    COLONOSCOPY  03/11/2013    Complete Colonoscopy    CT ANGIO NECK  5/22/2020    CT NECK ANGIO W AND WO IV CONTRAST 5/22/2020 [facility]    CT HEAD ANGIO W AND WO IV CONTRAST  1/6/2016    CT HEAD ANGIO W AND WO IV CONTRAST 1/6/2016 [facility]    CT HEAD ANGIO W AND WO IV CONTRAST  3/1/2016    CT HEAD ANGIO W AND WO IV CONTRAST 3/1/2016 [facility]    CT HEAD ANGIO W AND WO IV CONTRAST  5/22/2020    CT HEAD ANGIO W AND WO IV CONTRAST 5/22/2020 [facility]    OTHER SURGICAL HISTORY  03/07/2013    Excision Of Lesion Scalp    OTHER SURGICAL HISTORY  06/08/2016    Intracranial Aneurysm Repair    OTHER SURGICAL HISTORY  07/21/2020    Colonoscopy complete for polypectomy    OTHER SURGICAL HISTORY  05/12/2020    Cyst excision    OTHER SURGICAL HISTORY  05/12/2020    [place] tooth extraction     Family History   Problem Relation Name Age of Onset    Alzheimer's disease Mother      Colon cancer Father      Diabetes Father      Hypertension Father      Subarachnoid hemorrhage Father          nontraumatic    Hypertension Sister      Other (Cardiac problems)  Sister      Cancer Brother      Gout Brother      Hypertension Brother      Pancreatitis Brother      Coronary artery disease Paternal Grandfather      Autism Other          Childhood onset pervasive developmental autistic disorder    Hypertension Other       Social History     Tobacco Use    Smoking status: Never    Smokeless tobacco: Never   Substance Use Topics    Alcohol use: Not Currently    Drug use: Never       Physical Exam   ED Triage Vitals [03/04/24 0822]   Temperature Heart Rate Respirations BP   (!) 2.5 °C (36.5 °F) 64 18 145/57      Pulse Ox Temp Source Heart Rate Source Patient Position   100 % Oral -- --      BP Location FiO2 (%)     -- --       Physical Exam    CONSTITUTIONAL: Patient is awake alert pleasant and cooperative did not look toxic in distress.. Breathing comfortably oriented times three. Patient noted a pulse ox in the mid to high 90s after going out to, heart rate in the 60s.  No friction rub no murmur no JVD.  No peripheral edema. HENMT: The airway was intact. There was no ear or nose discharge. No drooling or stridor. Neck was supple. Talking and breathing comfortably.  EYES: Clear bilaterally, pupils equal, round and reactive to light. CARDIOVASCULAR: Regular rate and rhythm. No friction rub or murmur good peripheral pulses no peripheral edema. Nontender Homans sign negative. RESPIRATORY: Patient was breathing comfortably. No tachypnea no respiratory distress.  On auscultation he has diminished breath sounds crackles in the lung bases.  However has good skin perfusion. GASTROINTESTINAL: Abdomen soft positive bowel sounds noted right lower quadrant tenderness no guarding, rebound surgery no CVA tenderness 20 no pulsatile mass.   GENITOURINARY:  No costovertebral angle tenderness. MUSCULOSKELETAL: Head was normocephalic atraumatic cervical thoracic lumbar spine nontender.  Chest was nontender  Both upper extremity good motion nontender intact distal pulse intact sensation.  NEUROLOGICAL: Awake alert pleasant and cooperative. No motor or sensory deficit no arms selective noted. Intact neurovascular function and motor function. No facial drooping or drooling. Talking and breathing comfortably.  Cranial nerves II to XII grossly intact. No arms selective noted. No nystagmus.  SKIN: Skin normal color for race, warm, dry and intact. No evidence of trauma or lesions. PSYCHIATRIC: Awake alert and without acute distress. No obvious depression, no suicidal thoughts or ideation. Appropriate mood. Talking and normal tone. HEME/LYMPH: No adenopathy or splenomegaly.        CRITICAL CARE       DISPOSITION      ED Course & MDM   Diagnoses as of 03/04/24 1332   Dizziness   Vertigo   Urinary tract infection without hematuria, site unspecified       Medical Decision Making  Patient with history of chronic vertigo has been having dizziness she has stopped taking Antivert as she was scheduled for autonomic testing and Antivert can interfere with her test report.    She denies any chest pain or short of breath fever chills cough vomiting or diarrhea.  She denies any specific any UTI symptoms or back pain headache or neck stiffness.  Examination reveals no focal neurologic deficit neck is supple lungs are clear heart regular rate and neurovascularly abdomen soft nontender.  She has good motion arms legs no motor or sensory deficit neck was supple no nystagmus no ataxia of the arms or legs.  Chest x-ray was negative influenza A influenza B was negative.  Magnesium level is normal.  Serum glucose 102 however rest of chemistry essentially unremarkable.    CT of the head was negative for acute intracranial abnormality.  Urinalysis shows findings suggestive of UTI urine culture pending.  Case discussed with Dr. Edy Joyce MDThe patient's neurologist, Neurologist on-call with Dr. Pelaez,He advised patient can be discharged home.And follow-up as an outpatientHe agreed patient can haveMeclizine if she 0.4  however it will affect her potassium patient decided to4 4She really wants to use the meclizine for her symptoms, However is going to affect her testAnd test report.  Patient decided to useAntivert as she feels dizzy and wants to reschedule her appointment I have notified  by Dr.Advised patient request to reschedule appointmentFor testing.  Patient was given IV fluidAn antibiotic she does not truly have any allergy to penicillin as she did not have any reaction to penicillin as result she was given Rocephin 1 g IV daily based on urinalysis report culture report pending.Patient was discharged home with a Bactrim DSAnd continue taking antibiotic follow with her neurologist Dr. Pelaez if any problem concern or new symptoms chest pain or short of breath return to ER.  Procedure  Procedures     Alem Pearson MD  03/04/24 1325       Alem Pearson MD  03/04/24 0857

## 2024-03-04 NOTE — DISCHARGE INSTRUCTIONS
Use antibiotic as prescribed for UTI if any problem concern or new symptoms worsening symptoms to ER.Follow-up with your primary care physician.Continue take meclizine.  Call to reschedule your testing through Dr. Pelaez office.Family to watch closely.  If any problem concern return to ER immediately.

## 2024-03-05 LAB — BACTERIA UR CULT: ABNORMAL

## 2024-03-06 ENCOUNTER — APPOINTMENT (OUTPATIENT)
Dept: NEUROLOGY | Facility: HOSPITAL | Age: 79
End: 2024-03-06
Payer: MEDICARE

## 2024-03-06 ENCOUNTER — TELEPHONE (OUTPATIENT)
Dept: PHARMACY | Facility: HOSPITAL | Age: 79
End: 2024-03-06
Payer: MEDICARE

## 2024-03-06 NOTE — PROGRESS NOTES
EDPD Note: Duration Adjust    Contacted Mariajose Haq regarding positive urine culture/result that was taken during their recent emergency room visit. I completed education with  patient . Patient asymptomatic in ER and states she does not have any urinary symptoms at the time of call. Denies flank pain, fever/chills, N/V. Patient already on day 2 of antibiotic. Told her 3 days of medication should treat her uncomplicated UTI anyway, so go ahead and take for full 3 days. Patient agreed with plan. The patient is being treated with the proper medication; however, the duration of the discharge prescription is incorrect. I gave verbal education about the new medication duration found below. No further follow up needed from EDPD Team.     Discharged with Bactrim DS BID for 7 days    Drug: Bactrim DS  Si tab BID   Days Supply: 3    Maci Lew, SunithaD

## 2024-03-07 ENCOUNTER — HOSPITAL ENCOUNTER (OUTPATIENT)
Facility: HOSPITAL | Age: 79
Setting detail: OBSERVATION
Discharge: HOME HEALTH | End: 2024-03-08
Attending: STUDENT IN AN ORGANIZED HEALTH CARE EDUCATION/TRAINING PROGRAM | Admitting: NURSE PRACTITIONER
Payer: MEDICARE

## 2024-03-07 ENCOUNTER — APPOINTMENT (OUTPATIENT)
Dept: RADIOLOGY | Facility: HOSPITAL | Age: 79
End: 2024-03-07
Payer: MEDICARE

## 2024-03-07 DIAGNOSIS — M54.9 BACK PAIN, UNSPECIFIED BACK LOCATION, UNSPECIFIED BACK PAIN LATERALITY, UNSPECIFIED CHRONICITY: ICD-10-CM

## 2024-03-07 DIAGNOSIS — G89.29 CHRONIC LEFT-SIDED BACK PAIN, UNSPECIFIED BACK LOCATION: Primary | ICD-10-CM

## 2024-03-07 DIAGNOSIS — M54.9 CHRONIC LEFT-SIDED BACK PAIN, UNSPECIFIED BACK LOCATION: Primary | ICD-10-CM

## 2024-03-07 PROBLEM — R29.6 FREQUENT FALLS: Status: ACTIVE | Noted: 2024-03-07

## 2024-03-07 PROBLEM — I50.32 CHRONIC DIASTOLIC CONGESTIVE HEART FAILURE (MULTI): Status: ACTIVE | Noted: 2024-03-07

## 2024-03-07 PROBLEM — N30.00 ACUTE CYSTITIS WITHOUT HEMATURIA: Status: ACTIVE | Noted: 2024-03-07

## 2024-03-07 LAB
ALBUMIN SERPL BCP-MCNC: 3.6 G/DL (ref 3.4–5)
ALP SERPL-CCNC: 146 U/L (ref 33–136)
ALT SERPL W P-5'-P-CCNC: 19 U/L (ref 7–45)
ANION GAP SERPL CALC-SCNC: 11 MMOL/L (ref 10–20)
APPEARANCE UR: CLEAR
AST SERPL W P-5'-P-CCNC: 33 U/L (ref 9–39)
BASOPHILS # BLD AUTO: 0.06 X10*3/UL (ref 0–0.1)
BASOPHILS NFR BLD AUTO: 1.2 %
BILIRUB SERPL-MCNC: 0.4 MG/DL (ref 0–1.2)
BILIRUB UR STRIP.AUTO-MCNC: NEGATIVE MG/DL
BNP SERPL-MCNC: 69 PG/ML (ref 0–99)
BUN SERPL-MCNC: 16 MG/DL (ref 6–23)
CALCIUM SERPL-MCNC: 8.4 MG/DL (ref 8.6–10.3)
CARDIAC TROPONIN I PNL SERPL HS: 8 NG/L (ref 0–13)
CHLORIDE SERPL-SCNC: 103 MMOL/L (ref 98–107)
CO2 SERPL-SCNC: 25 MMOL/L (ref 21–32)
COLOR UR: NORMAL
CREAT SERPL-MCNC: 0.78 MG/DL (ref 0.5–1.05)
EGFRCR SERPLBLD CKD-EPI 2021: 78 ML/MIN/1.73M*2
EOSINOPHIL # BLD AUTO: 0.12 X10*3/UL (ref 0–0.4)
EOSINOPHIL NFR BLD AUTO: 2.4 %
ERYTHROCYTE [DISTWIDTH] IN BLOOD BY AUTOMATED COUNT: 13.9 % (ref 11.5–14.5)
GLUCOSE SERPL-MCNC: 87 MG/DL (ref 74–99)
GLUCOSE UR STRIP.AUTO-MCNC: NEGATIVE MG/DL
HCT VFR BLD AUTO: 32.1 % (ref 36–46)
HGB BLD-MCNC: 10.6 G/DL (ref 12–16)
HOLD SPECIMEN: NORMAL
HOLD SPECIMEN: NORMAL
IMM GRANULOCYTES # BLD AUTO: 0.02 X10*3/UL (ref 0–0.5)
IMM GRANULOCYTES NFR BLD AUTO: 0.4 % (ref 0–0.9)
KETONES UR STRIP.AUTO-MCNC: NEGATIVE MG/DL
LEUKOCYTE ESTERASE UR QL STRIP.AUTO: NEGATIVE
LYMPHOCYTES # BLD AUTO: 1.18 X10*3/UL (ref 0.8–3)
LYMPHOCYTES NFR BLD AUTO: 24 %
MCH RBC QN AUTO: 31.9 PG (ref 26–34)
MCHC RBC AUTO-ENTMCNC: 33 G/DL (ref 32–36)
MCV RBC AUTO: 97 FL (ref 80–100)
MONOCYTES # BLD AUTO: 0.65 X10*3/UL (ref 0.05–0.8)
MONOCYTES NFR BLD AUTO: 13.2 %
NEUTROPHILS # BLD AUTO: 2.89 X10*3/UL (ref 1.6–5.5)
NEUTROPHILS NFR BLD AUTO: 58.8 %
NITRITE UR QL STRIP.AUTO: NEGATIVE
NRBC BLD-RTO: 0 /100 WBCS (ref 0–0)
PH UR STRIP.AUTO: 7 [PH]
PLATELET # BLD AUTO: 153 X10*3/UL (ref 150–450)
POTASSIUM SERPL-SCNC: 4.2 MMOL/L (ref 3.5–5.3)
PROT SERPL-MCNC: 6 G/DL (ref 6.4–8.2)
PROT UR STRIP.AUTO-MCNC: NEGATIVE MG/DL
RBC # BLD AUTO: 3.32 X10*6/UL (ref 4–5.2)
RBC # UR STRIP.AUTO: NEGATIVE /UL
SODIUM SERPL-SCNC: 135 MMOL/L (ref 136–145)
SP GR UR STRIP.AUTO: 1.03
UROBILINOGEN UR STRIP.AUTO-MCNC: <2 MG/DL
WBC # BLD AUTO: 4.9 X10*3/UL (ref 4.4–11.3)

## 2024-03-07 PROCEDURE — 74177 CT ABD & PELVIS W/CONTRAST: CPT | Performed by: RADIOLOGY

## 2024-03-07 PROCEDURE — 2500000002 HC RX 250 W HCPCS SELF ADMINISTERED DRUGS (ALT 637 FOR MEDICARE OP, ALT 636 FOR OP/ED): Mod: MUE | Performed by: NURSE PRACTITIONER

## 2024-03-07 PROCEDURE — G0378 HOSPITAL OBSERVATION PER HR: HCPCS

## 2024-03-07 PROCEDURE — 87086 URINE CULTURE/COLONY COUNT: CPT | Mod: GENLAB | Performed by: STUDENT IN AN ORGANIZED HEALTH CARE EDUCATION/TRAINING PROGRAM

## 2024-03-07 PROCEDURE — 2500000004 HC RX 250 GENERAL PHARMACY W/ HCPCS (ALT 636 FOR OP/ED): Performed by: NURSE PRACTITIONER

## 2024-03-07 PROCEDURE — 85025 COMPLETE CBC W/AUTO DIFF WBC: CPT | Performed by: STUDENT IN AN ORGANIZED HEALTH CARE EDUCATION/TRAINING PROGRAM

## 2024-03-07 PROCEDURE — 83880 ASSAY OF NATRIURETIC PEPTIDE: CPT | Performed by: STUDENT IN AN ORGANIZED HEALTH CARE EDUCATION/TRAINING PROGRAM

## 2024-03-07 PROCEDURE — 80053 COMPREHEN METABOLIC PANEL: CPT | Performed by: STUDENT IN AN ORGANIZED HEALTH CARE EDUCATION/TRAINING PROGRAM

## 2024-03-07 PROCEDURE — 2550000001 HC RX 255 CONTRASTS: Performed by: STUDENT IN AN ORGANIZED HEALTH CARE EDUCATION/TRAINING PROGRAM

## 2024-03-07 PROCEDURE — 2500000001 HC RX 250 WO HCPCS SELF ADMINISTERED DRUGS (ALT 637 FOR MEDICARE OP): Performed by: NURSE PRACTITIONER

## 2024-03-07 PROCEDURE — 81003 URINALYSIS AUTO W/O SCOPE: CPT | Performed by: STUDENT IN AN ORGANIZED HEALTH CARE EDUCATION/TRAINING PROGRAM

## 2024-03-07 PROCEDURE — 74177 CT ABD & PELVIS W/CONTRAST: CPT

## 2024-03-07 PROCEDURE — 99223 1ST HOSP IP/OBS HIGH 75: CPT

## 2024-03-07 PROCEDURE — 2500000004 HC RX 250 GENERAL PHARMACY W/ HCPCS (ALT 636 FOR OP/ED): Performed by: STUDENT IN AN ORGANIZED HEALTH CARE EDUCATION/TRAINING PROGRAM

## 2024-03-07 PROCEDURE — 84484 ASSAY OF TROPONIN QUANT: CPT | Performed by: STUDENT IN AN ORGANIZED HEALTH CARE EDUCATION/TRAINING PROGRAM

## 2024-03-07 PROCEDURE — 36415 COLL VENOUS BLD VENIPUNCTURE: CPT | Performed by: EMERGENCY MEDICINE

## 2024-03-07 PROCEDURE — 72131 CT LUMBAR SPINE W/O DYE: CPT | Performed by: RADIOLOGY

## 2024-03-07 PROCEDURE — 99285 EMERGENCY DEPT VISIT HI MDM: CPT | Mod: 25

## 2024-03-07 PROCEDURE — 72131 CT LUMBAR SPINE W/O DYE: CPT | Mod: RCN

## 2024-03-07 PROCEDURE — 96374 THER/PROPH/DIAG INJ IV PUSH: CPT

## 2024-03-07 RX ORDER — ENOXAPARIN SODIUM 100 MG/ML
40 INJECTION SUBCUTANEOUS EVERY 24 HOURS
Status: DISCONTINUED | OUTPATIENT
Start: 2024-03-07 | End: 2024-03-08 | Stop reason: HOSPADM

## 2024-03-07 RX ORDER — CARVEDILOL 12.5 MG/1
12.5 TABLET ORAL EVERY 12 HOURS SCHEDULED
Status: DISCONTINUED | OUTPATIENT
Start: 2024-03-07 | End: 2024-03-08 | Stop reason: HOSPADM

## 2024-03-07 RX ORDER — ACETAMINOPHEN 325 MG/1
650 TABLET ORAL EVERY 4 HOURS PRN
Status: DISCONTINUED | OUTPATIENT
Start: 2024-03-07 | End: 2024-03-08 | Stop reason: HOSPADM

## 2024-03-07 RX ORDER — POTASSIUM CHLORIDE 1.5 G/1.58G
20 POWDER, FOR SOLUTION ORAL DAILY
COMMUNITY

## 2024-03-07 RX ORDER — PANTOPRAZOLE SODIUM 40 MG/1
40 TABLET, DELAYED RELEASE ORAL
Status: DISCONTINUED | OUTPATIENT
Start: 2024-03-08 | End: 2024-03-08 | Stop reason: HOSPADM

## 2024-03-07 RX ORDER — ONDANSETRON HYDROCHLORIDE 2 MG/ML
4 INJECTION, SOLUTION INTRAVENOUS EVERY 8 HOURS PRN
Status: DISCONTINUED | OUTPATIENT
Start: 2024-03-07 | End: 2024-03-08 | Stop reason: HOSPADM

## 2024-03-07 RX ORDER — OXCARBAZEPINE 150 MG/1
1050 TABLET, FILM COATED ORAL 2 TIMES DAILY
Status: DISCONTINUED | OUTPATIENT
Start: 2024-03-07 | End: 2024-03-08 | Stop reason: HOSPADM

## 2024-03-07 RX ORDER — KETOROLAC TROMETHAMINE 15 MG/ML
15 INJECTION, SOLUTION INTRAMUSCULAR; INTRAVENOUS ONCE
Status: COMPLETED | OUTPATIENT
Start: 2024-03-07 | End: 2024-03-07

## 2024-03-07 RX ORDER — FOLIC ACID 1 MG/1
1 TABLET ORAL DAILY
Status: DISCONTINUED | OUTPATIENT
Start: 2024-03-07 | End: 2024-03-08 | Stop reason: HOSPADM

## 2024-03-07 RX ORDER — ACETAMINOPHEN 325 MG/1
650 TABLET ORAL EVERY 4 HOURS PRN
Status: DISCONTINUED | OUTPATIENT
Start: 2024-03-07 | End: 2024-03-08

## 2024-03-07 RX ORDER — SULFAMETHOXAZOLE AND TRIMETHOPRIM 800; 160 MG/1; MG/1
1 TABLET ORAL 2 TIMES DAILY
Status: DISCONTINUED | OUTPATIENT
Start: 2024-03-07 | End: 2024-03-08 | Stop reason: HOSPADM

## 2024-03-07 RX ORDER — POTASSIUM CHLORIDE 1.5 G/1.58G
20 POWDER, FOR SOLUTION ORAL DAILY
Status: DISCONTINUED | OUTPATIENT
Start: 2024-03-07 | End: 2024-03-08 | Stop reason: HOSPADM

## 2024-03-07 RX ORDER — HYDROXYCHLOROQUINE SULFATE 200 MG/1
200 TABLET, FILM COATED ORAL
Status: DISCONTINUED | OUTPATIENT
Start: 2024-03-07 | End: 2024-03-08 | Stop reason: HOSPADM

## 2024-03-07 RX ORDER — SERTRALINE HYDROCHLORIDE 50 MG/1
100 TABLET, FILM COATED ORAL NIGHTLY
Status: DISCONTINUED | OUTPATIENT
Start: 2024-03-07 | End: 2024-03-08 | Stop reason: HOSPADM

## 2024-03-07 RX ORDER — POLYETHYLENE GLYCOL 3350 17 G/17G
17 POWDER, FOR SOLUTION ORAL DAILY PRN
Status: DISCONTINUED | OUTPATIENT
Start: 2024-03-07 | End: 2024-03-08 | Stop reason: HOSPADM

## 2024-03-07 RX ORDER — PNV NO.95/FERROUS FUM/FOLIC AC 28MG-0.8MG
100 TABLET ORAL DAILY
Status: DISCONTINUED | OUTPATIENT
Start: 2024-03-07 | End: 2024-03-08 | Stop reason: HOSPADM

## 2024-03-07 RX ADMIN — IOHEXOL 75 ML: 350 INJECTION, SOLUTION INTRAVENOUS at 08:02

## 2024-03-07 RX ADMIN — HYDROXYCHLOROQUINE SULFATE 200 MG: 200 TABLET ORAL at 17:36

## 2024-03-07 RX ADMIN — ENOXAPARIN SODIUM 40 MG: 40 INJECTION SUBCUTANEOUS at 20:45

## 2024-03-07 RX ADMIN — SULFAMETHOXAZOLE AND TRIMETHOPRIM 1 TABLET: 800; 160 TABLET ORAL at 20:45

## 2024-03-07 RX ADMIN — KETOROLAC TROMETHAMINE 15 MG: 15 INJECTION INTRAMUSCULAR; INTRAVENOUS at 08:01

## 2024-03-07 RX ADMIN — SERTRALINE HYDROCHLORIDE 100 MG: 50 TABLET ORAL at 20:45

## 2024-03-07 RX ADMIN — OXCARBAZEPINE 1050 MG: 150 TABLET, FILM COATED ORAL at 20:44

## 2024-03-07 RX ADMIN — CARVEDILOL 12.5 MG: 12.5 TABLET, FILM COATED ORAL at 20:44

## 2024-03-07 SDOH — ECONOMIC STABILITY: HOUSING INSECURITY: IN THE LAST 12 MONTHS, HOW MANY PLACES HAVE YOU LIVED?: 1

## 2024-03-07 SDOH — ECONOMIC STABILITY: INCOME INSECURITY: IN THE PAST 12 MONTHS, HAS THE ELECTRIC, GAS, OIL, OR WATER COMPANY THREATENED TO SHUT OFF SERVICE IN YOUR HOME?: NO

## 2024-03-07 SDOH — ECONOMIC STABILITY: INCOME INSECURITY: IN THE LAST 12 MONTHS, WAS THERE A TIME WHEN YOU WERE NOT ABLE TO PAY THE MORTGAGE OR RENT ON TIME?: NO

## 2024-03-07 SDOH — SOCIAL STABILITY: SOCIAL NETWORK: ARE YOU MARRIED, WIDOWED, DIVORCED, SEPARATED, NEVER MARRIED, OR LIVING WITH A PARTNER?: WIDOWED

## 2024-03-07 SDOH — SOCIAL STABILITY: SOCIAL INSECURITY: ARE YOU OR HAVE YOU BEEN THREATENED OR ABUSED PHYSICALLY, EMOTIONALLY, OR SEXUALLY BY ANYONE?: NO

## 2024-03-07 SDOH — SOCIAL STABILITY: SOCIAL INSECURITY: DO YOU FEEL ANYONE HAS EXPLOITED OR TAKEN ADVANTAGE OF YOU FINANCIALLY OR OF YOUR PERSONAL PROPERTY?: NO

## 2024-03-07 SDOH — SOCIAL STABILITY: SOCIAL INSECURITY: HAS ANYONE EVER THREATENED TO HURT YOUR FAMILY OR YOUR PETS?: NO

## 2024-03-07 SDOH — SOCIAL STABILITY: SOCIAL INSECURITY: DO YOU FEEL UNSAFE GOING BACK TO THE PLACE WHERE YOU ARE LIVING?: NO

## 2024-03-07 SDOH — ECONOMIC STABILITY: FOOD INSECURITY: WITHIN THE PAST 12 MONTHS, YOU WORRIED THAT YOUR FOOD WOULD RUN OUT BEFORE YOU GOT MONEY TO BUY MORE.: NEVER TRUE

## 2024-03-07 SDOH — ECONOMIC STABILITY: FOOD INSECURITY: WITHIN THE PAST 12 MONTHS, THE FOOD YOU BOUGHT JUST DIDN'T LAST AND YOU DIDN'T HAVE MONEY TO GET MORE.: NEVER TRUE

## 2024-03-07 SDOH — SOCIAL STABILITY: SOCIAL INSECURITY: HAVE YOU HAD THOUGHTS OF HARMING ANYONE ELSE?: NO

## 2024-03-07 SDOH — SOCIAL STABILITY: SOCIAL INSECURITY: DOES ANYONE TRY TO KEEP YOU FROM HAVING/CONTACTING OTHER FRIENDS OR DOING THINGS OUTSIDE YOUR HOME?: NO

## 2024-03-07 SDOH — ECONOMIC STABILITY: TRANSPORTATION INSECURITY
IN THE PAST 12 MONTHS, HAS LACK OF TRANSPORTATION KEPT YOU FROM MEETINGS, WORK, OR FROM GETTING THINGS NEEDED FOR DAILY LIVING?: NO

## 2024-03-07 SDOH — ECONOMIC STABILITY: TRANSPORTATION INSECURITY
IN THE PAST 12 MONTHS, HAS THE LACK OF TRANSPORTATION KEPT YOU FROM MEDICAL APPOINTMENTS OR FROM GETTING MEDICATIONS?: NO

## 2024-03-07 SDOH — ECONOMIC STABILITY: HOUSING INSECURITY
IN THE LAST 12 MONTHS, WAS THERE A TIME WHEN YOU DID NOT HAVE A STEADY PLACE TO SLEEP OR SLEPT IN A SHELTER (INCLUDING NOW)?: NO

## 2024-03-07 SDOH — ECONOMIC STABILITY: INCOME INSECURITY: HOW HARD IS IT FOR YOU TO PAY FOR THE VERY BASICS LIKE FOOD, HOUSING, MEDICAL CARE, AND HEATING?: NOT HARD AT ALL

## 2024-03-07 SDOH — SOCIAL STABILITY: SOCIAL INSECURITY: ARE THERE ANY APPARENT SIGNS OF INJURIES/BEHAVIORS THAT COULD BE RELATED TO ABUSE/NEGLECT?: NO

## 2024-03-07 SDOH — SOCIAL STABILITY: SOCIAL INSECURITY: ABUSE: ADULT

## 2024-03-07 SDOH — SOCIAL STABILITY: SOCIAL INSECURITY: WERE YOU ABLE TO COMPLETE ALL THE BEHAVIORAL HEALTH SCREENINGS?: YES

## 2024-03-07 ASSESSMENT — ENCOUNTER SYMPTOMS
ACTIVITY CHANGE: 1
EYES NEGATIVE: 1
GASTROINTESTINAL NEGATIVE: 1
PSYCHIATRIC NEGATIVE: 1
WEAKNESS: 1
HEMATOLOGIC/LYMPHATIC NEGATIVE: 1
ALLERGIC/IMMUNOLOGIC NEGATIVE: 1
CARDIOVASCULAR NEGATIVE: 1
RESPIRATORY NEGATIVE: 1

## 2024-03-07 ASSESSMENT — PATIENT HEALTH QUESTIONNAIRE - PHQ9
2. FEELING DOWN, DEPRESSED OR HOPELESS: NOT AT ALL
1. LITTLE INTEREST OR PLEASURE IN DOING THINGS: NOT AT ALL
SUM OF ALL RESPONSES TO PHQ9 QUESTIONS 1 & 2: 0

## 2024-03-07 ASSESSMENT — LIFESTYLE VARIABLES
AUDIT-C TOTAL SCORE: 0
HOW MANY STANDARD DRINKS CONTAINING ALCOHOL DO YOU HAVE ON A TYPICAL DAY: PATIENT DOES NOT DRINK
HOW OFTEN DO YOU HAVE A DRINK CONTAINING ALCOHOL: NEVER
HOW OFTEN DO YOU HAVE 6 OR MORE DRINKS ON ONE OCCASION: NEVER
SKIP TO QUESTIONS 9-10: 1
AUDIT-C TOTAL SCORE: 0

## 2024-03-07 ASSESSMENT — ACTIVITIES OF DAILY LIVING (ADL)
ASSISTIVE_DEVICE: COMMODE;WALKER
ADEQUATE_TO_COMPLETE_ADL: YES
HEARING - LEFT EAR: FUNCTIONAL
HEARING - RIGHT EAR: FUNCTIONAL
LACK_OF_TRANSPORTATION: PATIENT DECLINED
FEEDING YOURSELF: INDEPENDENT
TOILETING: INDEPENDENT
PATIENT'S MEMORY ADEQUATE TO SAFELY COMPLETE DAILY ACTIVITIES?: YES
HEARING - RIGHT EAR: FUNCTIONAL
HEARING - LEFT EAR: FUNCTIONAL
ADEQUATE_TO_COMPLETE_ADL: YES
BATHING: INDEPENDENT
JUDGMENT_ADEQUATE_SAFELY_COMPLETE_DAILY_ACTIVITIES: YES
FEEDING YOURSELF: INDEPENDENT
DRESSING YOURSELF: INDEPENDENT
WALKS IN HOME: INDEPENDENT
GROOMING: INDEPENDENT
JUDGMENT_ADEQUATE_SAFELY_COMPLETE_DAILY_ACTIVITIES: YES
GROOMING: INDEPENDENT
BATHING: INDEPENDENT
PATIENT'S MEMORY ADEQUATE TO SAFELY COMPLETE DAILY ACTIVITIES?: YES
ASSISTIVE_DEVICE: COMMODE;WALKER
TOILETING: INDEPENDENT
WALKS IN HOME: INDEPENDENT
DRESSING YOURSELF: INDEPENDENT

## 2024-03-07 ASSESSMENT — COLUMBIA-SUICIDE SEVERITY RATING SCALE - C-SSRS
6. HAVE YOU EVER DONE ANYTHING, STARTED TO DO ANYTHING, OR PREPARED TO DO ANYTHING TO END YOUR LIFE?: NO
1. IN THE PAST MONTH, HAVE YOU WISHED YOU WERE DEAD OR WISHED YOU COULD GO TO SLEEP AND NOT WAKE UP?: NO
2. HAVE YOU ACTUALLY HAD ANY THOUGHTS OF KILLING YOURSELF?: NO

## 2024-03-07 ASSESSMENT — COGNITIVE AND FUNCTIONAL STATUS - GENERAL
MOVING TO AND FROM BED TO CHAIR: A LOT
PERSONAL GROOMING: A LITTLE
TURNING FROM BACK TO SIDE WHILE IN FLAT BAD: A LITTLE
WALKING IN HOSPITAL ROOM: A LOT
MOVING FROM LYING ON BACK TO SITTING ON SIDE OF FLAT BED WITH BEDRAILS: A LOT
CLIMB 3 TO 5 STEPS WITH RAILING: TOTAL
EATING MEALS: A LITTLE
DAILY ACTIVITIY SCORE: 14
DRESSING REGULAR LOWER BODY CLOTHING: A LOT
STANDING UP FROM CHAIR USING ARMS: A LOT
TOILETING: A LOT
DRESSING REGULAR UPPER BODY CLOTHING: A LOT
MOBILITY SCORE: 12
PATIENT BASELINE BEDBOUND: NO
HELP NEEDED FOR BATHING: A LOT

## 2024-03-07 ASSESSMENT — PAIN SCALES - GENERAL
PAINLEVEL_OUTOF10: 2
PAINLEVEL_OUTOF10: 8
PAINLEVEL_OUTOF10: 0 - NO PAIN

## 2024-03-07 ASSESSMENT — PAIN DESCRIPTION - ORIENTATION: ORIENTATION: LEFT

## 2024-03-07 ASSESSMENT — PAIN DESCRIPTION - PAIN TYPE: TYPE: ACUTE PAIN

## 2024-03-07 ASSESSMENT — PAIN - FUNCTIONAL ASSESSMENT: PAIN_FUNCTIONAL_ASSESSMENT: 0-10

## 2024-03-07 NOTE — ED PROVIDER NOTES
Chief Complaint: Back pain   HPI: This is a 78-year-old female, presenting to the emergency department for back pain which began 2 days ago, and increased in severity today.  Patient states that she has had multiple falls, over the last several days, and states that the back pain started after the fall.  She states that it is very difficult for her to get out of the bed secondary to pain, and she feels that she is unable to care for herself at home.  She denies any numbness or tingling her legs.  She denies any chest pain, shortness of breath, abdominal pain.  Of note, the patient was seen and evaluated 3 days ago after 1 fall and was diagnosed with a UTI for which she was prescribed Bactrim.  She does not have any urinary symptoms at this time.    Past Medical History:   Diagnosis Date    Ataxia, unspecified     Ataxia    Other conditions influencing health status     Cognitive Functions Current Level Impaired Mild    Other conditions influencing health status     Screening for malignant neoplasms, colon    Other specified anxiety disorders     Depression with anxiety    Other specified anxiety disorders     Depression with anxiety    Pain in unspecified joint     Multiple joint pain    Personal history of other diseases of the circulatory system 09/27/2018    History of hypertension    Personal history of other diseases of the nervous system and sense organs     History of sleep apnea    Personal history of other specified conditions     History of fatigue    Umbilical hernia without obstruction or gangrene     Umbilical hernia    Unspecified cataract     Cataract of both eyes    Unspecified convulsions (CMS/HCC)     Seizure    Unspecified convulsions (CMS/HCC)     Convulsive disorder      Past Surgical History:   Procedure Laterality Date    CATARACT EXTRACTION  07/17/2013    Cataract Surgery    COLONOSCOPY  03/11/2013    Complete Colonoscopy    CT ANGIO NECK  5/22/2020    CT NECK ANGIO W AND WO IV CONTRAST  5/22/2020 [facility]    CT HEAD ANGIO W AND WO IV CONTRAST  1/6/2016    CT HEAD ANGIO W AND WO IV CONTRAST 1/6/2016 [facility]    CT HEAD ANGIO W AND WO IV CONTRAST  3/1/2016    CT HEAD ANGIO W AND WO IV CONTRAST 3/1/2016 [facility]    CT HEAD ANGIO W AND WO IV CONTRAST  5/22/2020    CT HEAD ANGIO W AND WO IV CONTRAST 5/22/2020 [facility]    OTHER SURGICAL HISTORY  03/07/2013    Excision Of Lesion Scalp    OTHER SURGICAL HISTORY  06/08/2016    Intracranial Aneurysm Repair    OTHER SURGICAL HISTORY  07/21/2020    Colonoscopy complete for polypectomy    OTHER SURGICAL HISTORY  05/12/2020    Cyst excision    OTHER SURGICAL HISTORY  05/12/2020    [place] tooth extraction       Physical Exam  Constitutional:       Appearance: Normal appearance.   HENT:      Head: Normocephalic and atraumatic.      Mouth/Throat:      Mouth: Mucous membranes are moist.   Eyes:      Conjunctiva/sclera: Conjunctivae normal.   Cardiovascular:      Rate and Rhythm: Normal rate.      Pulses: Normal pulses.   Pulmonary:      Effort: Pulmonary effort is normal.   Abdominal:      General: Abdomen is flat.      Palpations: Abdomen is soft.   Musculoskeletal:         General: Tenderness (paralumbar musculature) present. Normal range of motion.      Cervical back: Normal range of motion.   Skin:     General: Skin is warm and dry.   Neurological:      General: No focal deficit present.      Mental Status: She is alert.   Psychiatric:         Mood and Affect: Mood normal.            ED Course/MDM       This is a 78 y.o. female presenting to the ED for back pain which began 2 days ago after some falls.  Patient states that she has had 2 falls in the last 3 days.  She states that she was seen and evaluated in this emergency department after the first fall and was diagnosed with a urinary tract infection, prescribed Bactrim which she has been taking.  She states that the back pain makes it impossible for her to get  out of bed secondary to pain, however she denies any weakness, numbness, tingling.  She states she had to call the ambulance to help her out of bed today to get to the emergency departmentshe does not feel that she can care for herself at home.  On physical exam, the patient is resting comfortably in bed, no acute distress.  Heart is regular rate and rhythm, lungs are clear to auscultation bilaterally.  Abdomen soft and nontender.  The patient does have some paralumbar tenderness to palpation without any overlying ecchymosis, erythema, crepitus.  No midline bony tenderness.  Strength is symmetric in the bilateral lower extremities.  2+ DP pulses bilaterally.  Given the recent trauma, a CT L-spine and a CT abdomen pelvis was obtained, both of which were grossly unremarkable.  Lab work was also grossly unremarkable.  Patient was able to ambulate however did have a very unsteady gait, which she states is worse than her baseline.  As the patient does not feel that she is able to care for herself at home, I do feel that she will require admission for observation, PT, OT, and likely placement.  Patient was admitted in stable condition.    Final Impression  1.  Back pain  2.  Inability to perform activities of daily living  Disposition/Plan: Admit  Condition at disposition: Stable.     Amie Celis DO  Emergency Medicine Physician     Amie Celis DO  03/07/24 5418

## 2024-03-07 NOTE — NURSING NOTE
Patient resting in chair, patients admission complete. Nurse at the bedside, call bell in reach. Patient denies any needs at this time.

## 2024-03-07 NOTE — CARE PLAN
The patient's goals for the shift include  Patients goal this shift is to not fall     The clinical goals for the shift include Patient will not fall this shift    Patient was admitted this shift for frequent falls, patient is from home and cares for her Autistic son. Patient states that she uses a walker and has a hard time ambulating. Patient stated to this RN that this morning she woke up and could not move. Patient has scattered bruising and a large bruise to the back of the left shoulder/upper back. Patient also has multiple abrasions to her arms bilaterally from recent falls. Patient sat in chair for the majority of the shift with chair alarm on. Patient need assistance walking to the bedside commode.

## 2024-03-07 NOTE — ED TRIAGE NOTES
Pt arrived to ED via EMS with c/o Left sided flank pain that radiates to back pain. Pt was recently dx with kidney infection and is on Bactrim  and had a recent fall.

## 2024-03-07 NOTE — PROGRESS NOTES
03/07/24 1330   Discharge Planning   Living Arrangements Children  (Autistic son)   Support Systems Children;Friends/neighbors  (oldest son- lives in [place])   Assistance Needed yes  (DME:  Rolator, walker, wheelchair, grab bars, CPAP)   Type of Residence Private residence  (1 story house)   Number of Stairs to Enter Residence 2   Number of Stairs Within Residence 0   Do you have animals or pets at home? No   Home or Post Acute Services In home services   Patient expects to be discharged to: Home   Does the patient need discharge transport arranged? Yes   RoundTrip coordination needed? No   Has discharge transport been arranged? No     TCC spoke with patient regarding discharge planning and home going needs. Patient lives  in a 1 story house with her autistic adult son.  Her other son lives in [place].    Patient says she uses a walker or Rolator with ambulation.  Her son has a caregiver once a week assisting him with bath/shower.  She has a friend that comes in and cleans her home once a month.  Friend also takes her to the store for shopping.  Patient doesn't drive.  She uses CPAP but without water.  DME listed above.  Confirmed with patient PCP is going to be Dr. Aylin Suggs but she hasn't scheduled an appt. yet.  Discharge Plan is for patient to return home with home care if able to get a PCP.  TCC will continue to follow for changes in discharge planning needs.

## 2024-03-08 ENCOUNTER — DOCUMENTATION (OUTPATIENT)
Dept: HOME HEALTH SERVICES | Facility: HOME HEALTH | Age: 79
End: 2024-03-08
Payer: MEDICARE

## 2024-03-08 ENCOUNTER — APPOINTMENT (OUTPATIENT)
Dept: RADIOLOGY | Facility: HOSPITAL | Age: 79
End: 2024-03-08
Payer: MEDICARE

## 2024-03-08 ENCOUNTER — DOCUMENTATION (OUTPATIENT)
Dept: HOME HEALTH SERVICES | Facility: HOME HEALTH | Age: 79
End: 2024-03-08

## 2024-03-08 ENCOUNTER — HOME HEALTH ADMISSION (OUTPATIENT)
Dept: HOME HEALTH SERVICES | Facility: HOME HEALTH | Age: 79
End: 2024-03-08
Payer: MEDICARE

## 2024-03-08 VITALS
BODY MASS INDEX: 37.42 KG/M2 | SYSTOLIC BLOOD PRESSURE: 155 MMHG | HEART RATE: 69 BPM | DIASTOLIC BLOOD PRESSURE: 70 MMHG | HEIGHT: 63 IN | OXYGEN SATURATION: 100 % | TEMPERATURE: 97.7 F | RESPIRATION RATE: 18 BRPM | WEIGHT: 211.2 LBS

## 2024-03-08 LAB
ANION GAP SERPL CALC-SCNC: 17 MMOL/L (ref 10–20)
BUN SERPL-MCNC: 16 MG/DL (ref 6–23)
CALCIUM SERPL-MCNC: 8.1 MG/DL (ref 8.6–10.3)
CHLORIDE SERPL-SCNC: 100 MMOL/L (ref 98–107)
CO2 SERPL-SCNC: 22 MMOL/L (ref 21–32)
CREAT SERPL-MCNC: 0.81 MG/DL (ref 0.5–1.05)
EGFRCR SERPLBLD CKD-EPI 2021: 75 ML/MIN/1.73M*2
ERYTHROCYTE [DISTWIDTH] IN BLOOD BY AUTOMATED COUNT: 13.7 % (ref 11.5–14.5)
GLUCOSE SERPL-MCNC: 84 MG/DL (ref 74–99)
HCT VFR BLD AUTO: 31 % (ref 36–46)
HGB BLD-MCNC: 10.4 G/DL (ref 12–16)
LIPASE SERPL-CCNC: 60 U/L (ref 9–82)
MCH RBC QN AUTO: 32.1 PG (ref 26–34)
MCHC RBC AUTO-ENTMCNC: 33.5 G/DL (ref 32–36)
MCV RBC AUTO: 96 FL (ref 80–100)
NRBC BLD-RTO: 0 /100 WBCS (ref 0–0)
PLATELET # BLD AUTO: 144 X10*3/UL (ref 150–450)
POTASSIUM SERPL-SCNC: 4.4 MMOL/L (ref 3.5–5.3)
RBC # BLD AUTO: 3.24 X10*6/UL (ref 4–5.2)
SODIUM SERPL-SCNC: 135 MMOL/L (ref 136–145)
WBC # BLD AUTO: 4.8 X10*3/UL (ref 4.4–11.3)

## 2024-03-08 PROCEDURE — 97165 OT EVAL LOW COMPLEX 30 MIN: CPT | Mod: GO

## 2024-03-08 PROCEDURE — G0378 HOSPITAL OBSERVATION PER HR: HCPCS

## 2024-03-08 PROCEDURE — 76705 ECHO EXAM OF ABDOMEN: CPT

## 2024-03-08 PROCEDURE — 36415 COLL VENOUS BLD VENIPUNCTURE: CPT | Performed by: NURSE PRACTITIONER

## 2024-03-08 PROCEDURE — 97161 PT EVAL LOW COMPLEX 20 MIN: CPT | Mod: GP | Performed by: PHYSICAL THERAPIST

## 2024-03-08 PROCEDURE — 2500000001 HC RX 250 WO HCPCS SELF ADMINISTERED DRUGS (ALT 637 FOR MEDICARE OP): Performed by: NURSE PRACTITIONER

## 2024-03-08 PROCEDURE — 80048 BASIC METABOLIC PNL TOTAL CA: CPT | Performed by: NURSE PRACTITIONER

## 2024-03-08 PROCEDURE — 2500000004 HC RX 250 GENERAL PHARMACY W/ HCPCS (ALT 636 FOR OP/ED)

## 2024-03-08 PROCEDURE — 9420000001 HC RT PATIENT EDUCATION 5 MIN

## 2024-03-08 PROCEDURE — 83690 ASSAY OF LIPASE: CPT

## 2024-03-08 PROCEDURE — 94660 CPAP INITIATION&MGMT: CPT

## 2024-03-08 PROCEDURE — 85027 COMPLETE CBC AUTOMATED: CPT | Performed by: NURSE PRACTITIONER

## 2024-03-08 PROCEDURE — 99232 SBSQ HOSP IP/OBS MODERATE 35: CPT

## 2024-03-08 PROCEDURE — 76705 ECHO EXAM OF ABDOMEN: CPT | Performed by: RADIOLOGY

## 2024-03-08 PROCEDURE — 2500000002 HC RX 250 W HCPCS SELF ADMINISTERED DRUGS (ALT 637 FOR MEDICARE OP, ALT 636 FOR OP/ED): Performed by: NURSE PRACTITIONER

## 2024-03-08 PROCEDURE — 97530 THERAPEUTIC ACTIVITIES: CPT | Mod: GP | Performed by: PHYSICAL THERAPIST

## 2024-03-08 RX ORDER — PREDNISONE 20 MG/1
40 TABLET ORAL DAILY
Qty: 8 TABLET | Refills: 0 | Status: SHIPPED | OUTPATIENT
Start: 2024-03-09 | End: 2024-03-13

## 2024-03-08 RX ORDER — TRAMADOL HYDROCHLORIDE 50 MG/1
50 TABLET ORAL EVERY 8 HOURS PRN
Status: DISCONTINUED | OUTPATIENT
Start: 2024-03-08 | End: 2024-03-08 | Stop reason: HOSPADM

## 2024-03-08 RX ORDER — KETOROLAC TROMETHAMINE 15 MG/ML
15 INJECTION, SOLUTION INTRAMUSCULAR; INTRAVENOUS EVERY 6 HOURS PRN
Status: DISCONTINUED | OUTPATIENT
Start: 2024-03-08 | End: 2024-03-08 | Stop reason: HOSPADM

## 2024-03-08 RX ORDER — LIDOCAINE 560 MG/1
1 PATCH PERCUTANEOUS; TOPICAL; TRANSDERMAL DAILY
Status: DISCONTINUED | OUTPATIENT
Start: 2024-03-08 | End: 2024-03-08 | Stop reason: HOSPADM

## 2024-03-08 RX ORDER — PREDNISONE 20 MG/1
40 TABLET ORAL DAILY
Status: DISCONTINUED | OUTPATIENT
Start: 2024-03-08 | End: 2024-03-08 | Stop reason: HOSPADM

## 2024-03-08 RX ORDER — ACETAMINOPHEN 325 MG/1
975 TABLET ORAL EVERY 8 HOURS
Status: DISCONTINUED | OUTPATIENT
Start: 2024-03-08 | End: 2024-03-08 | Stop reason: HOSPADM

## 2024-03-08 RX ORDER — LIDOCAINE 560 MG/1
1 PATCH PERCUTANEOUS; TOPICAL; TRANSDERMAL DAILY
Qty: 15 PATCH | Refills: 0 | Status: SHIPPED | OUTPATIENT
Start: 2024-03-09

## 2024-03-08 RX ADMIN — POTASSIUM CHLORIDE 20 MEQ: 1.5 POWDER, FOR SOLUTION ORAL at 09:27

## 2024-03-08 RX ADMIN — HYDROXYCHLOROQUINE SULFATE 200 MG: 200 TABLET ORAL at 08:00

## 2024-03-08 RX ADMIN — CARVEDILOL 12.5 MG: 12.5 TABLET, FILM COATED ORAL at 09:27

## 2024-03-08 RX ADMIN — PREDNISONE 40 MG: 20 TABLET ORAL at 09:27

## 2024-03-08 RX ADMIN — FOLIC ACID 1 MG: 1 TABLET ORAL at 09:27

## 2024-03-08 RX ADMIN — OXCARBAZEPINE 1050 MG: 150 TABLET, FILM COATED ORAL at 09:26

## 2024-03-08 RX ADMIN — Medication 100 MCG: at 09:27

## 2024-03-08 RX ADMIN — SULFAMETHOXAZOLE AND TRIMETHOPRIM 1 TABLET: 800; 160 TABLET ORAL at 09:27

## 2024-03-08 RX ADMIN — PANTOPRAZOLE SODIUM 40 MG: 40 TABLET, DELAYED RELEASE ORAL at 05:29

## 2024-03-08 ASSESSMENT — COGNITIVE AND FUNCTIONAL STATUS - GENERAL
DAILY ACTIVITIY SCORE: 19
CLIMB 3 TO 5 STEPS WITH RAILING: A LOT
MOBILITY SCORE: 16
PERSONAL GROOMING: A LITTLE
DRESSING REGULAR LOWER BODY CLOTHING: A LITTLE
TOILETING: A LITTLE
DRESSING REGULAR UPPER BODY CLOTHING: A LITTLE
STANDING UP FROM CHAIR USING ARMS: A LITTLE
MOVING TO AND FROM BED TO CHAIR: A LITTLE
WALKING IN HOSPITAL ROOM: A LITTLE
TURNING FROM BACK TO SIDE WHILE IN FLAT BAD: A LOT
HELP NEEDED FOR BATHING: A LITTLE
MOVING FROM LYING ON BACK TO SITTING ON SIDE OF FLAT BED WITH BEDRAILS: A LITTLE

## 2024-03-08 ASSESSMENT — PAIN - FUNCTIONAL ASSESSMENT
PAIN_FUNCTIONAL_ASSESSMENT: 0-10
PAIN_FUNCTIONAL_ASSESSMENT: 0-10

## 2024-03-08 ASSESSMENT — ACTIVITIES OF DAILY LIVING (ADL)
ADL_ASSISTANCE: INDEPENDENT
BATHING_ASSISTANCE: MINIMAL
ADL_ASSISTANCE: INDEPENDENT

## 2024-03-08 ASSESSMENT — PAIN SCALES - GENERAL
PAINLEVEL_OUTOF10: 0 - NO PAIN
PAINLEVEL_OUTOF10: 5 - MODERATE PAIN
PAINLEVEL_OUTOF10: 3

## 2024-03-08 NOTE — PROGRESS NOTES
11:35 AM  Per IDT rounds, patient is wanting a hospital bed.    Discussed with patient.  Patient's best friend Katie mentioned that patient's  is already working on getting a bed.   is [facility] @ [phone]. This TCC phone and L/M with [facility] to find out status of bed and/or if this TCC needed to assist with ordering it.

## 2024-03-08 NOTE — SIGNIFICANT EVENT
Patient requires a hospital bed on discharge due to frequent falls and lumbosacral arthritis limiting ability to transfer and move in the bed. Patient also has a history of diastolic congestive heart failure with orthopnea and would benefit from having HOB elevated.

## 2024-03-08 NOTE — CARE PLAN
The patient's goals for the shift include      The clinical goals for the shift include patient will ambulate with assistance this shift    Patient ambulating to bathroom and sitting in chair with assist and walker expressing the need for a hospital bed and wanting to be discharged today. Awaiting ultrasound results for discharge will return home with home health care and ordering hospital bed

## 2024-03-08 NOTE — H&P
History Of Present Illness  Mariajose Haq is a 78 y.o. female presenting with Back Pain x 2 days, bilateral leg pain/phantom pain, and frequent falls.  Patient states she was recently discharged from rehab in [place] 3 weeks ago.  Patient had declined home PT/OT at that time.  Patient  is present and helps with any needs.  Patient does have left lower extremity prosthetic which she is continuing to have fitted.  Patient and  feel patient could use more therapy whether that be inpatient or outpatient.       Past Medical History  Past Medical History:   Diagnosis Date    Ataxia, unspecified     Ataxia    Other conditions influencing health status     Cognitive Functions Current Level Impaired Mild    Other conditions influencing health status     Screening for malignant neoplasms, colon    Other specified anxiety disorders     Depression with anxiety    Other specified anxiety disorders     Depression with anxiety    Pain in unspecified joint     Multiple joint pain    Personal history of other diseases of the circulatory system 09/27/2018    History of hypertension    Personal history of other diseases of the nervous system and sense organs     History of sleep apnea    Personal history of other specified conditions     History of fatigue    Umbilical hernia without obstruction or gangrene     Umbilical hernia    Unspecified cataract     Cataract of both eyes    Unspecified convulsions (CMS/HCC)     Seizure    Unspecified convulsions (CMS/HCC)     Convulsive disorder       Surgical History  Past Surgical History:   Procedure Laterality Date    CATARACT EXTRACTION  07/17/2013    Cataract Surgery    COLONOSCOPY  03/11/2013    Complete Colonoscopy    CT ANGIO NECK  5/22/2020    CT NECK ANGIO W AND WO IV CONTRAST 5/22/2020 [facility]    CT HEAD ANGIO W AND WO IV CONTRAST  1/6/2016    CT HEAD ANGIO W AND WO IV CONTRAST 1/6/2016 [facility]    CT HEAD ANGIO W AND WO IV CONTRAST  3/1/2016     CT HEAD ANGIO W AND WO IV CONTRAST [place]2016 [facility] [place] [facility]    CT HEAD ANGIO W AND WO IV CONTRAST  5/22/2020    CT HEAD ANGIO W AND WO IV CONTRAST 5/22/2020 [facility]    OTHER SURGICAL HISTORY  03/07/2013    Excision Of Lesion Scalp    OTHER SURGICAL HISTORY  06/08/2016    Intracranial Aneurysm Repair    OTHER SURGICAL HISTORY  07/21/2020    Colonoscopy complete for polypectomy    OTHER SURGICAL HISTORY  05/12/2020    Cyst excision    OTHER SURGICAL HISTORY  05/12/2020    [place] tooth extraction        Social History  She reports that she has never smoked. She has never used smokeless tobacco. She reports that she does not currently use alcohol. She reports that she does not use drugs.    Family History  Family History   Problem Relation Name Age of Onset    Alzheimer's disease Mother      Colon cancer Father      Diabetes Father      Hypertension Father      Subarachnoid hemorrhage Father          nontraumatic    Hypertension Sister      Other (Cardiac problems) Sister      Cancer Brother      Gout Brother      Hypertension Brother      Pancreatitis Brother      Coronary artery disease Paternal Grandfather      Autism Other          Childhood onset pervasive developmental autistic disorder    Hypertension Other          Allergies  Clobazam, Lamotrigine, Levetiracetam, Topiramate, and Penicillins    Review of Systems   Constitutional:  Positive for activity change.   HENT: Negative.     Eyes: Negative.    Respiratory: Negative.     Cardiovascular: Negative.    Gastrointestinal: Negative.    Genitourinary: Negative.    Musculoskeletal:  Positive for gait problem.   Skin: Negative.    Allergic/Immunologic: Negative.    Neurological:  Positive for weakness.   Hematological: Negative.    Psychiatric/Behavioral: Negative.          Physical Exam  Constitutional:       Appearance: She is obese.   HENT:      Head: Normocephalic and atraumatic.      Nose: Nose normal.      Mouth/Throat:      Mouth: Mucous  "membranes are moist.   Eyes:      Extraocular Movements: Extraocular movements intact.   Cardiovascular:      Rate and Rhythm: Normal rate and regular rhythm.      Pulses: Normal pulses.      Heart sounds: Normal heart sounds.   Pulmonary:      Effort: Pulmonary effort is normal.      Breath sounds: Normal breath sounds.   Abdominal:      General: Bowel sounds are normal.      Palpations: Abdomen is soft.   Musculoskeletal:         General: Normal range of motion.      Cervical back: Normal range of motion.      Comments: Left BKA   Skin:     General: Skin is warm and dry.      Capillary Refill: Capillary refill takes less than 2 seconds.   Neurological:      Mental Status: She is alert and oriented to person, place, and time. Mental status is at baseline.      Motor: Weakness present.      Gait: Gait abnormal.   Psychiatric:         Mood and Affect: Mood normal.         Behavior: Behavior normal.          Last Recorded Vitals  Blood pressure 163/76, pulse 70, temperature 36.4 °C (97.5 °F), temperature source Temporal, resp. rate 18, height 1.6 m (5' 3\"), weight 95.8 kg (211 lb 3.2 oz), SpO2 98 %.    Relevant Results  Scheduled medications  carvedilol, 12.5 mg, oral, q12h QUENTIN  cyanocobalamin, 100 mcg, oral, Daily  enoxaparin, 40 mg, subcutaneous, q24h  folic acid, 1 mg, oral, Daily  hydroxychloroquine, 200 mg, oral, BID with meals  OXcarbazepine, 1,050 mg, oral, BID  [START ON 3/8/2024] pantoprazole, 40 mg, oral, Daily before breakfast  potassium chloride, 20 mEq, oral, Daily  sertraline, 100 mg, oral, Nightly  sulfamethoxazole-trimethoprim, 1 tablet, oral, BID      Continuous medications     PRN medications  PRN medications: acetaminophen, acetaminophen, ondansetron, polyethylene glycol    CT lumbar spine wo IV contrast    Result Date: 3/7/2024  Interpreted By:  Eladio Santillan, STUDY: CT ABDOMEN PELVIS W IV CONTRAST; CT LUMBAR SPINE WO IV CONTRAST; 3/7/2024 7:54 am   INDICATION: 79 y/o   F with  Signs/Symptoms:left " flank pain, concern for kidney stone v kidney infection; Signs/Symptoms:fall, back pain.   LIMITATIONS: None.   ACCESSION NUMBER(S): [id number]; [id number]   ORDERING CLINICIAN: GUILLAUME GAMBOA   TECHNIQUE: After the administration of   oral water and IV nonionic contrast, spiral axial images were obtained from the xiphoid down through the symphysis pubis. Sagittal and coronal reconstruction images were generated. Bone, mediastinal, lung, and liver windows were reviewed. IV contrast agent was Omnipaque 350, 75 mL.   Thin-section axial, coronal, and sagittal reconstruction images were generated of the lumbar spine from the dataset and presented in bone algorithm.   COMPARISON: Previous CT scan abdomen and pelvis from 04/22/2015 and correlation with lumbar spine plain films from 09/04/2018.   FINDINGS: LUNG BASES: Cardiomegaly. Tiny pericardial effusion. Small hiatal hernia. No mass or pneumonia or pleural effusion in either lung base..   LIVER: No hepatomegaly. Liver density was  within the limits of normal. No  liver lesion evident in this  exam.   GALLBLADDER: The gallbladder is dilated, measuring 54 mm in greatest diameter. There is no adjacent edema. There is however a thickened partially calcified curvilinear structure posteriorly in the body and fundus of the gallbladder measuring up to 11 mm in thickness. This could be mural, but could also be a thickened partially calcified section of a larger more lucent gallstone. No other indication of possible wall thickening in the gallbladder.   BILE DUCTS: No intrahepatic biliary ductal dilatation.  Common bile duct was within the limits of normal.   SPLEEN: No  splenomegaly. No splenic mass..   PANCREAS: No pancreatic mass or inflammation, or ductal dilatation.   KIDNEYS/ADRENALS: Stable hypodense nodule posteriorly in the left adrenal gland measuring 12 mm on image 35. No adrenal lesion on the right. There is a nonobstructing lower pole right renal stone  measuring 3 mm. There is a nonobstructing lower pole left renal stone measuring 4 mm. No hydronephrosis, mass, or perinephric edema in either kidney. No ureteral stone or dilatation.   BLADDER/PELVIS: Urinary bladder was grossly intact. No uterine enlargement or gross adnexal mass.   GREAT VESSELS/RETROPERITONEUM: Moderate aortoiliac calcifications. Otherwise, abdominal aorta and IVC were intact. No suspicious retroperitoneal adenopathy. No suspicious mesenteric adenopathy. No suspicious pelvic or inguinal adenopathy.   PERITONEUM: No ascites. No pneumoperitoneum. No peritoneal or mesenteric mass or inflammation.   BOWEL: Small hiatal hernia. Otherwise, the stomach was  grossly intact. There was no small bowel dilatation or small bowel wall thickening. No small-bowel obstruction. Colonic diverticulosis in the distal left colon through the mid sigmoid. Mild scattered retained colonic stool and gas, most pronounced in the cecum. There was no colonic wall thickening or large bowel obstruction.  No edema adjacent to the colon. The cecal appendix was intact.   LUMBAR SPINE/BONES: No destructive lytic or blastic bone lesion. There are 5 lumbar vertebral bodies with partial lumbarization of sclerotic arthritic changes in both SI joints. Bilateral hip joint space narrowing with spur formation, moderate on the right and very mild on the left. No acute fracture or dislocation of either hip. No acute pelvic fracture. S1.   Grade 1 anterolisthesis of L5 over S1. Mild-to-moderate diffuse lumbar spine disc space narrowing with endplate osteophytosis and vacuum disc phenomenon. No lumbar spine compression fracture or posterior element fracture. Multilevel interfacet hypertrophy with spur formation in the mid and distal lumbar spine, most pronounced at L4-5 and L5-S1. No focal disc herniation. There is neural foramen stenosis on the right at L3-4 and L4-5 and on the left at L5-S1.   ABDOMINAL WALL: Small to moderate-sized fat  containing umbilical hernia, unchanged..       Solitary small nonobstructing lower pole stone in both kidneys. No hydronephrosis. No ureteral stone or dilatation on either side.   Small hiatal hernia.   Left colon and sigmoid diverticulosis without acute associated inflammation.   Abnormal appearance of the gallbladder as described. This could be due to a large peripherally partially calcified gallstone. Asymmetric gallbladder wall thickening such as from adenomyomatosis or tumor is not excluded. Initial recommendation is for gallbladder ultrasound in follow-up.   Stable small to moderate-sized fat containing umbilical hernia.   Cardiomegaly.   Arthritic changes in the lumbosacral spine and pelvis as described. No CT evidence of fracture of the lumbar spine or pelvis in this exam.   MACRO: None   Signed by: Eladio Santillan 3/7/2024 8:36 AM Dictation workstation:   [id number]    CT abdomen pelvis w IV contrast    Result Date: 3/7/2024  Interpreted By:  Eladio Santillan, STUDY: CT ABDOMEN PELVIS W IV CONTRAST; CT LUMBAR SPINE WO IV CONTRAST; 3/7/2024 7:54 am   INDICATION: 79 y/o   F with  Signs/Symptoms:left flank pain, concern for kidney stone v kidney infection; Signs/Symptoms:fall, back pain.   LIMITATIONS: None.   ACCESSION NUMBER(S): [id number]; [id number]   ORDERING CLINICIAN: GUILLAUME GAMBOA   TECHNIQUE: After the administration of   oral water and IV nonionic contrast, spiral axial images were obtained from the xiphoid down through the symphysis pubis. Sagittal and coronal reconstruction images were generated. Bone, mediastinal, lung, and liver windows were reviewed. IV contrast agent was Omnipaque 350, 75 mL.   Thin-section axial, coronal, and sagittal reconstruction images were generated of the lumbar spine from the dataset and presented in bone algorithm.   COMPARISON: Previous CT scan abdomen and pelvis from 04/22/2015 and correlation with lumbar spine plain films from 09/04/2018.   FINDINGS: LUNG BASES:  Cardiomegaly. Tiny pericardial effusion. Small hiatal hernia. No mass or pneumonia or pleural effusion in either lung base..   LIVER: No hepatomegaly. Liver density was  within the limits of normal. No  liver lesion evident in this  exam.   GALLBLADDER: The gallbladder is dilated, measuring 54 mm in greatest diameter. There is no adjacent edema. There is however a thickened partially calcified curvilinear structure posteriorly in the body and fundus of the gallbladder measuring up to 11 mm in thickness. This could be mural, but could also be a thickened partially calcified section of a larger more lucent gallstone. No other indication of possible wall thickening in the gallbladder.   BILE DUCTS: No intrahepatic biliary ductal dilatation.  Common bile duct was within the limits of normal.   SPLEEN: No  splenomegaly. No splenic mass..   PANCREAS: No pancreatic mass or inflammation, or ductal dilatation.   KIDNEYS/ADRENALS: Stable hypodense nodule posteriorly in the left adrenal gland measuring 12 mm on image 35. No adrenal lesion on the right. There is a nonobstructing lower pole right renal stone measuring 3 mm. There is a nonobstructing lower pole left renal stone measuring 4 mm. No hydronephrosis, mass, or perinephric edema in either kidney. No ureteral stone or dilatation.   BLADDER/PELVIS: Urinary bladder was grossly intact. No uterine enlargement or gross adnexal mass.   GREAT VESSELS/RETROPERITONEUM: Moderate aortoiliac calcifications. Otherwise, abdominal aorta and IVC were intact. No suspicious retroperitoneal adenopathy. No suspicious mesenteric adenopathy. No suspicious pelvic or inguinal adenopathy.   PERITONEUM: No ascites. No pneumoperitoneum. No peritoneal or mesenteric mass or inflammation.   BOWEL: Small hiatal hernia. Otherwise, the stomach was  grossly intact. There was no small bowel dilatation or small bowel wall thickening. No small-bowel obstruction. Colonic diverticulosis in the distal left  colon through the mid sigmoid. Mild scattered retained colonic stool and gas, most pronounced in the cecum. There was no colonic wall thickening or large bowel obstruction.  No edema adjacent to the colon. The cecal appendix was intact.   LUMBAR SPINE/BONES: No destructive lytic or blastic bone lesion. There are 5 lumbar vertebral bodies with partial lumbarization of sclerotic arthritic changes in both SI joints. Bilateral hip joint space narrowing with spur formation, moderate on the right and very mild on the left. No acute fracture or dislocation of either hip. No acute pelvic fracture. S1.   Grade 1 anterolisthesis of L5 over S1. Mild-to-moderate diffuse lumbar spine disc space narrowing with endplate osteophytosis and vacuum disc phenomenon. No lumbar spine compression fracture or posterior element fracture. Multilevel interfacet hypertrophy with spur formation in the mid and distal lumbar spine, most pronounced at L4-5 and L5-S1. No focal disc herniation. There is neural foramen stenosis on the right at L3-4 and L4-5 and on the left at L5-S1.   ABDOMINAL WALL: Small to moderate-sized fat containing umbilical hernia, unchanged..       Solitary small nonobstructing lower pole stone in both kidneys. No hydronephrosis. No ureteral stone or dilatation on either side.   Small hiatal hernia.   Left colon and sigmoid diverticulosis without acute associated inflammation.   Abnormal appearance of the gallbladder as described. This could be due to a large peripherally partially calcified gallstone. Asymmetric gallbladder wall thickening such as from adenomyomatosis or tumor is not excluded. Initial recommendation is for gallbladder ultrasound in follow-up.   Stable small to moderate-sized fat containing umbilical hernia.   Cardiomegaly.   Arthritic changes in the lumbosacral spine and pelvis as described. No CT evidence of fracture of the lumbar spine or pelvis in this exam.   MACRO: None   Signed by: Eladio Santillan  3/7/2024 8:36 AM Dictation workstation:   [id number]    ECG 12 lead    Result Date: 3/4/2024  Normal sinus rhythm Minimal voltage criteria for LVH, may be normal variant Nonspecific T wave abnormality Abnormal ECG When compared with ECG of 27-DEC-2022 11:16, Previous ECG has undetermined rhythm, needs review    XR chest 1 view    Result Date: 3/4/2024  Interpreted By:  Maciej Sandoval, STUDY: XR CHEST 1 VIEW;  3/4/2024 9:10 am   INDICATION: Signs/Symptoms:Vertigo dizziness.   COMPARISON: Chest radiograph dated 12/27/2022.   ACCESSION NUMBER(S): [id number]   ORDERING CLINICIAN: TOM COLES   FINDINGS: AP radiograph of the chest was provided.   DEVICES: None.   CARDIOMEDIASTINAL SILHOUETTE: Cardiomediastinal silhouette is stable in size and configuration. Aortic atherosclerotic calcification.   LUNGS: Lungs are clear. No pneumothorax. No pleural effusion.   ABDOMEN: No remarkable upper abdominal findings.   BONES: No acute osseous changes.       No evidence of acute cardiopulmonary process.   MACRO: None   Signed by: Maciej Sandoval 3/4/2024 9:19 AM Dictation workstation:   [id number]    CT head wo IV contrast    Result Date: 3/4/2024  Interpreted By:  Maciej Sandoval, STUDY: CT HEAD WO IV CONTRAST;  3/4/2024 9:06 am   INDICATION: Signs/Symptoms:Vertigo dizziness.   COMPARISON: CT head dated 11/26/2021.   ACCESSION NUMBER(S): [id number]   ORDERING CLINICIAN: TOM COLES   TECHNIQUE: Noncontrast axial CT scan of head was performed.   FINDINGS: Parenchyma: Postoperative change of aneurysmal clipping with clip overlying the region of the left carotid terminus/posterior communicating artery. There is no acute intracranial hemorrhage. No CT apparent acute infarct. There is no mass effect or midline shift. Similar appearing remote lacunar infarct involving the left frontal lobe corona radiata and extending to the left lentiform nuclei. Additional unchanged remote appearing bilateral basal ganglia lacunar infarcts.  Mild to moderate chronic small vessel ischemic disease. Atherosclerotic calcification of the carotid siphons bilaterally.   CSF Spaces: The ventricles, sulci and basal cisterns are within normal limits for age with generalized brain atrophy.   Extra-Axial Fluid: There is no extraaxial fluid collection.   Calvarium: Prior left pterional craniotomy with healed appearance. No acute osseous abnormality.   Paranasal sinuses: Mild scattered ethmoidal mucosal sinus thickening. Paranasal sinuses remain well aerated.   Mastoids: Clear.   Orbits: Normal.   Soft tissues: Unremarkable.       No acute intracranial hemorrhage, mass effect, or CT apparent acute infarct.   Similar appearance of mild-to-moderate chronic small vessel ischemic disease with remote appearing bilateral basal ganglia lacunar infarcts. Similar generalized brain atrophy.   Postoperative change of prior left aneurysmal clipping.   MACRO: None   Signed by: Maciej Sandoval 3/4/2024 9:18 AM Dictation workstation:   [id number]     Results for orders placed or performed during the hospital encounter of 03/07/24 (from the past 24 hour(s))   Light Blue Top   Result Value Ref Range    Extra Tube Hold for add-ons.    Gray Top   Result Value Ref Range    Extra Tube Hold for add-ons.    Comprehensive metabolic panel   Result Value Ref Range    Glucose 87 74 - 99 mg/dL    Sodium 135 (L) 136 - 145 mmol/L    Potassium 4.2 3.5 - 5.3 mmol/L    Chloride 103 98 - 107 mmol/L    Bicarbonate 25 21 - 32 mmol/L    Anion Gap 11 10 - 20 mmol/L    Urea Nitrogen 16 6 - 23 mg/dL    Creatinine 0.78 0.50 - 1.05 mg/dL    eGFR 78 >60 mL/min/1.73m*2    Calcium 8.4 (L) 8.6 - 10.3 mg/dL    Albumin 3.6 3.4 - 5.0 g/dL    Alkaline Phosphatase 146 (H) 33 - 136 U/L    Total Protein 6.0 (L) 6.4 - 8.2 g/dL    AST 33 9 - 39 U/L    Bilirubin, Total 0.4 0.0 - 1.2 mg/dL    ALT 19 7 - 45 U/L   CBC and Auto Differential   Result Value Ref Range    WBC 4.9 4.4 - 11.3 x10*3/uL    nRBC 0.0 0.0 - 0.0 /100  WBCs    RBC 3.32 (L) 4.00 - 5.20 x10*6/uL    Hemoglobin 10.6 (L) 12.0 - 16.0 g/dL    Hematocrit 32.1 (L) 36.0 - 46.0 %    MCV 97 80 - 100 fL    MCH 31.9 26.0 - 34.0 pg    MCHC 33.0 32.0 - 36.0 g/dL    RDW 13.9 11.5 - 14.5 %    Platelets 153 150 - 450 x10*3/uL    Neutrophils % 58.8 40.0 - 80.0 %    Immature Granulocytes %, Automated 0.4 0.0 - 0.9 %    Lymphocytes % 24.0 13.0 - 44.0 %    Monocytes % 13.2 2.0 - 10.0 %    Eosinophils % 2.4 0.0 - 6.0 %    Basophils % 1.2 0.0 - 2.0 %    Neutrophils Absolute 2.89 1.60 - 5.50 x10*3/uL    Immature Granulocytes Absolute, Automated 0.02 0.00 - 0.50 x10*3/uL    Lymphocytes Absolute 1.18 0.80 - 3.00 x10*3/uL    Monocytes Absolute 0.65 0.05 - 0.80 x10*3/uL    Eosinophils Absolute 0.12 0.00 - 0.40 x10*3/uL    Basophils Absolute 0.06 0.00 - 0.10 x10*3/uL   Troponin I, High Sensitivity   Result Value Ref Range    Troponin I, High Sensitivity 8 0 - 13 ng/L   B-Type Natriuretic Peptide   Result Value Ref Range    BNP 69 0 - 99 pg/mL   Urinalysis with Reflex Microscopic   Result Value Ref Range    Color, Urine Straw Straw, Yellow    Appearance, Urine Clear Clear    Specific Gravity, Urine 1.034 1.005 - 1.035    pH, Urine 7.0 5.0, 5.5, 6.0, 6.5, 7.0, 7.5, 8.0    Protein, Urine NEGATIVE NEGATIVE mg/dL    Glucose, Urine NEGATIVE NEGATIVE mg/dL    Blood, Urine NEGATIVE NEGATIVE    Ketones, Urine NEGATIVE NEGATIVE mg/dL    Bilirubin, Urine NEGATIVE NEGATIVE    Urobilinogen, Urine <2.0 <2.0 mg/dL    Nitrite, Urine NEGATIVE NEGATIVE    Leukocyte Esterase, Urine NEGATIVE NEGATIVE        Assessment/Plan   Principal Problem:    Frequent falls  Active Problems:    Anxiety disorder    Benign essential HTN    Difficulty walking    Elevated alkaline phosphatase level    Seizure disorder, complex partial, without intractable epilepsy (CMS/HCC)    Rheumatoid arthritis (CMS/HCC)    H/O falling    Chronic diastolic congestive heart failure (CMS/HCC)    Acute cystitis without hematuria      Principal  Problem:    #Frequent falls    #Difficulty walking    #H/O falling  -PT/OT Eval and Treat  -CT Lumbar Spine/Abd/Pelvis  IMPRESSION:  Solitary small nonobstructing lower pole stone in both kidneys. No  hydronephrosis. No ureteral stone or dilatation on either side.  Small hiatal hernia.  Left colon and sigmoid diverticulosis without acute associated  inflammation.  Abnormal appearance of the gallbladder as described. This could be  due to a large peripherally partially calcified gallstone. Asymmetric  gallbladder wall thickening such as from adenomyomatosis or tumor is  not excluded. Initial recommendation is for gallbladder ultrasound in  follow-up.  Stable small to moderate-sized fat containing umbilical hernia.  Cardiomegaly.  Arthritic changes in the lumbosacral spine and pelvis as described.  No CT evidence of fracture of the lumbar spine or pelvis in this exam.      Active Problems:    #Anxiety disorder  -continue Zoloft 100 mg PO HS      #Benign essential HTN    #Chronic diastolic congestive heart failure (CMS/HCC)  -Troponin 8  -ECHO 4/8/22  CONCLUSIONS:   1. The left ventricular systolic function is normal with a 55-60% estimated ejection fraction.   2. Spectral Doppler shows an impaired relaxation pattern of left ventricular diastolic filling.   3. Mild aortic valve stenosis.   4. There is mild aortic valve regurgitation.   5. There is mild mitral and tricuspid regurgitation.   6. The estimated pulmonary artery pressure is mildly elevated with the RVSP at 40.5 mmHg.  -BNP 69  -continue Coreg 12.5 mg PO BID        #Elevated alkaline phosphatase level    #Rheumatoid arthritis (CMS/HCC)  -continue Plaquenil 200 mg PO BID        #Acute cystitis without hematuria  -Being treated prior to admission for UTI  -continue Bactrim DS PO BID       #Seizure Disorder  -continue Trileptal 1,050 mg PO BID    DVT Prophylaxis: Lovenox  Fluids: N/A  Nutrition: Regular    Code Status: Full Code    Pt requires inpatient stay at  this time.  Total accumulated time spent face to face and not face to face preparing to see the patient, obtaining and reviewing separately obtained history; performing a medically appropriate examination and/or evaluation; counseling and educating the patient, family; ordering medications, tests, or procedures; referring and communicating with other health care professionals; documenting clinical information in the patient's medical record; independently interpreting results and communicating the results to the patient, family; and care coordination was 45 minutes.      Jesenia Asher, ALENA-CNP

## 2024-03-08 NOTE — CARE PLAN
The patient's goals for the shift include      The clinical goals for the shift include Patient will be free from falls through out this shift      Problem: Pain  Goal: My pain/discomfort is manageable  Outcome: Progressing     Problem: Safety  Goal: Patient will be injury free during hospitalization  Outcome: Progressing  Goal: I will remain free of falls  Outcome: Progressing     Problem: Daily Care  Goal: Daily care needs are met  Outcome: Progressing     Problem: Psychosocial Needs  Goal: Demonstrates ability to cope with hospitalization/illness  Outcome: Progressing  Goal: Collaborate with me, my family, and caregiver to identify my specific goals  Outcome: Progressing     Problem: Discharge Barriers  Goal: My discharge needs are met  Outcome: Progressing     Problem: Fall/Injury  Goal: Not fall by end of shift  Outcome: Progressing  Goal: Be free from injury by end of the shift  Outcome: Progressing  Goal: Verbalize understanding of personal risk factors for fall in the hospital  Outcome: Progressing  Goal: Verbalize understanding of risk factor reduction measures to prevent injury from fall in the home  Outcome: Progressing  Goal: Use assistive devices by end of the shift  Outcome: Progressing  Goal: Pace activities to prevent fatigue by end of the shift  Outcome: Progressing

## 2024-03-08 NOTE — HH CARE COORDINATION
Home Care received a Referral for Nursing, Physical Therapy, and Occupational Therapy. We have processed the referral for a Start of Care on MARCH 9-10.     If you have any questions or concerns, please feel free to contact us at [phone]. Follow the prompts, enter your five digit zip code, and you will be directed to your care team on EAST 2.

## 2024-03-08 NOTE — DISCHARGE SUMMARY
Discharge Diagnosis  Frequent falls    Issues Requiring Follow-Up    Follow up with new PCP- Dr. Suggs    Discharge Meds     Your medication list        START taking these medications        Instructions Last Dose Given Next Dose Due   lidocaine 4 % patch  Start taking on: March 9, 2024      Place 1 patch over 12 hours on the skin once daily. Remove & discard patch within 12 hours or as directed by MD. Do not start before March 9, 2024.       predniSONE 20 mg tablet  Commonly known as: Deltasone  Start taking on: March 9, 2024      Take 2 tablets (40 mg) by mouth once daily for 4 doses. Do not start before March 9, 2024.              CHANGE how you take these medications        Instructions Last Dose Given Next Dose Due   sertraline 100 mg tablet  Commonly known as: Zoloft  What changed: when to take this      TAKE 1 TABLET ONCE DAILY.              CONTINUE taking these medications        Instructions Last Dose Given Next Dose Due   carvedilol 25 mg tablet  Commonly known as: Coreg           cholecalciferol 50 mcg (2,000 unit) capsule  Commonly known as: Vitamin D-3           cyanocobalamin 100 mcg tablet  Commonly known as: Vitamin B-12           famciclovir 500 mg tablet  Commonly known as: Famvir           folic acid 1 mg tablet  Commonly known as: Folvite      Take 1 tablet (1 mg) by mouth once daily.       furosemide 40 mg tablet  Commonly known as: Lasix      TAKE 1 TABLET BY MOUTH EVERY DAY AS NEEDED       guaiFENesin 600 mg 12 hr tablet  Commonly known as: Mucinex           hydroxychloroquine 200 mg tablet  Commonly known as: Plaquenil           I-CAPS ORAL           ibuprofen-diphenhydramine cit 200-38 mg tablet per tablet           magnesium oxide 250 mg magnesium tablet  Commonly known as: Mag-Ox           methotrexate 2.5 mg tablet  Commonly known as: Trexall           OXcarbazepine 300 mg tablet  Commonly known as: Trileptal      Take 3.5 tablets (1,050 mg) by mouth 2 times a day.       potassium  chloride 20 mEq packet  Commonly known as: Klor-Con           PriLOSEC OTC 20 mg EC tablet  Generic drug: omeprazole OTC           sulfamethoxazole-trimethoprim 800-160 mg tablet  Commonly known as: Bactrim DS      Take 1 tablet by mouth 2 times a day for 7 days.                 Where to Get Your Medications        These medications were sent to [id number] - [place], [place] - [address]  [address] [address]      Phone: [phone]   lidocaine 4 % patch  predniSONE 20 mg tablet         Test Results Pending At Discharge  Pending Labs       Order Current Status    Urine culture In process            Hospital Course   Mariajose Haq is a 78 y.o. female presenting with Back Pain x 2 days, bilateral leg pain/phantom pain, and frequent falls.  Patient states she was recently discharged from rehab in [place] 3 weeks ago.  Patient had declined home PT/OT at that time.  Patient  is present and helps with any needs.  Patient does have left lower extremity prosthetic which she is continuing to have fitted.  Patient and  feel patient could use more therapy whether that be inpatient or outpatient.       #Impaired functional mobility 2/2 back pain, generalized weakness  #Rheumatoid arthritis  - Patient reportedly unable to ambulate at home  - lactate WNL   - CT head negative for acute process   - CT A/P: Arthritic changes in the lumbosacral spine and pelvis as described.   No CT evidence of fracture of the lumbar spine or pelvis in this exam.   - Start prednisone 40 mg PO every day x 5 days  - Tylenol 975 mg PO q8h scheduled  - Toradol q6h PRN   - Tramadol PRN for severe pain  - Lidocaine patch ordered  - Continue folic acid, hydroxychloroquine, methotrexate   - PT/OT evals  - Patient to be discharged with [facility]; declined SNF   - Hospital bed requested prior to discharge; [facility] in communication with [facility]     #Abnormal CT of gallbladder  - CT A/P: Abnormal appearance of the  gallbladder as described. This could be   due to a large peripherally partially calcified gallstone. Asymmetric   gallbladder wall thickening such as from adenomyomatosis or tumor is   not excluded. Initial recommendation is for gallbladder ultrasound in   follow-up.   - Gallbladder US: Findings most compatible with a large gallstone in the gallbladder although its size with its shadowing limits its characterization. No   sonographic findings are evident to suggest acute cholecystitis. MRI may be helpful for further characterization of the structure in the gallbladder which depending on the clinical scenario could potentially be done on a less emergent basis. If there is concern for cystic duct obstruction, HIDA scan may be helpful.   - Lipase WNL   - Patient denies abdominal pain, nausea, vomiting, diarrhea  - Diet as tolerated     #UTI   - Patient was started on Bactrim as outpatient for UTI  - UA on admission negative   - Urine culture pending  - Continue Bactrim to complete course     #HTN  - Continue carvedilol  - Monitor HR and BP     #Anxiety   - Continue sertraline    DVT PPx: Lovenox  GI PPx: Protonix  Diet: Regular  Code Status: Full code     Disposition: Patient stable for discharge home with new [facility]. Hospital bed ordered by [facility] prior to discharge at patient's request. She is discharged on a 5 day course of prednisone and has instructions to complete the course of Bactrim that was prescribed as an outpatient for UTI. Patient will follow up with her new PCP (Dr. Suggs) and home care.     Total cumulative time spent in preparation of this discharge including documentation review, coordination of care with the medical team including PT/SW/care coordinators and treating consultants, discussion with patient and pertinent family members and finalization of prescriptions, follow-up appointments, and this discharge summary was approximately 45 minutes.       Pertinent Physical Exam At Time of  Discharge  Physical Exam  Constitutional:       General: She is not in acute distress.     Appearance: She is obese. She is not toxic-appearing.   HENT:      Head: Normocephalic and atraumatic.      Mouth/Throat:      Mouth: Mucous membranes are moist.   Eyes:      Conjunctiva/sclera: Conjunctivae normal.   Cardiovascular:      Rate and Rhythm: Normal rate and regular rhythm.   Pulmonary:      Effort: No respiratory distress.      Comments: Diminished breath sounds  Abdominal:      General: There is no distension.      Palpations: Abdomen is soft.      Tenderness: There is no abdominal tenderness. There is no guarding.      Comments: Whelan sign negative   Musculoskeletal:         General: No swelling.   Skin:     General: Skin is warm and dry.      Findings: No rash.   Neurological:      Mental Status: She is alert and oriented to person, place, and time.   Psychiatric:         Mood and Affect: Mood normal.         Behavior: Behavior normal.         Outpatient Follow-Up  Future Appointments   Date Time Provider Department [place]   3/13/2024 11:15 AM Aylin Suggs DO [id number] [facility]   3/22/2024 11:30 AM Magali Mendoza APRN-CNP [id number]   5/22/2024 10:00 AM Francie Herrera APRN-CNP [id number] [facility]   8/15/2024 10:40 AM Aura Mckenzie MD [id number] [facility]         Elena Hernandez PA-C

## 2024-03-08 NOTE — PROGRESS NOTES
Physical Therapy    Physical Therapy Evaluation & Treatment    Patient Name: Mariajose Haq  MRN: [id number]  Today's Date: 3/8/2024   Time Calculation  Start Time: 0945  Stop Time: 1010  Time Calculation (min): 25 min    Assessment/Plan   PT Assessment  PT Assessment Results: Decreased strength, Decreased endurance, Impaired balance, Decreased mobility, Decreased safety awareness  Rehab Prognosis: Good  End of Session Communication: Bedside nurse  Assessment Comment: Pleasant 78 y.o presents with weakness and impaired mobility. Pt. lives with her autistic son and is normally Fahad with WW. Pt. required assist to get out of bed today (in hospital bed) and is requesting a hospital  bed for home (states her bed is broken). Pt. currently required CGA for transfers and would benefit from additonal PT to address above noted limitations and prevent further decline.  End of Session Patient Position: Up in chair, Alarm on   IP OR SWING BED PT PLAN  Inpatient or Swing Bed: Inpatient  PT Plan  Treatment/Interventions: Bed mobility, Transfer training, Gait training, Stair training, Balance training, Strengthening, Endurance training, Therapeutic exercise, Therapeutic activity, Home exercise program  PT Plan: Skilled PT  PT Frequency: 3 times per week  PT Recommended Transfer Status: Assist x1  PT - OK to Discharge: Yes Based on completed evaluation and care plan recommendations, no barriers to discharge to next site of care        Subjective     General Visit Information:  General  Reason for Referral: impaired mobility, falls  Referred By: JONES Burger  Past Medical History Relevant to Rehab: Pt reporting she could not get out of bed at home d/t increased back pain resulting in hospital admission. (PMH: ataxia, mild cognitive impairment, depression, anxiety, HTN, sleep apnea, joint pain, seizures, CHF, rhumatoid arthritis)  Prior to Session Communication: Bedside nurse  Patient Position Received: Up in chair,  Alarm on  General Comment: masimo  Home Living:  Home Living  Type of Home: House  Lives With:  (autistic son)  Home Adaptive Equipment:  (walker and rollator)  Home Layout: One level, Able to live on main level with bedroom/bathroom  Home Access: Stairs to enter with rails  Entrance Stairs-Rails: Both  Entrance Stairs-Number of Steps: 4  Bathroom Shower/Tub: Tub/shower unit  Bathroom Toilet: Standard  Bathroom Equipment: Grab bars in shower (shower chair)  Prior Level of Function:  Prior Function Per Pt/Caregiver Report  Level of [place]: Independent with ADLs and functional transfers, Independent with homemaking with ambulation  Receives Help From:  (autistic son; friend)  ADL Assistance: Independent  Homemaking Assistance: Independent (son assist with laundry; pt has friend who cleans home once a month)  Ambulatory Assistance: Independent (uses walker inside home; rollator for community)  Precautions:  Precautions  Medical Precautions: Fall precautions  Vital Signs:  Vital Signs  Heart Rate: 69  SpO2: 97 % (briefly dropped into 80s with mobility. Unsure of pulse ox accuracy. However pt. did appear slightly SOB.)    Objective   Pain:  Pain Assessment  Pain Assessment: 0-10  Pain Score: 3  Pain Type: Acute pain  Pain Location: Back  Pain Orientation:  (R side)  Cognition:  Cognition  Overall Cognitive Status: Within Functional Limits    General Assessments:     Activity Tolerance  Endurance: Endurance does not limit participation in activity    Sensation  Sensation Comment: no significant deficits noted    Strength  Strength Comments: BLE: grossly 4-/5  Coordination  Movements are Fluid and Coordinated: Yes    Static Standing Balance  Static Standing-Comment/Number of Minutes: F+ with BUE support  Dynamic Standing Balance  Dynamic Standing-Comments: F+ with BUE support; had difficulty donning pants and performing pericare  Functional Assessments:  Bed Mobility  Bed Mobility: No (OT reported difficulty with bed  mobility (MODA))    Transfers  Transfer: Yes  Transfer 1  Technique 1: Sit to stand, Stand to sit  Transfer Device 1: Walker, Gait belt  Transfer Level of Assistance 1: Contact guard  Transfers 2  Transfer to 2: Sit, Toilet, Stand  Transfer Device 2: Walker  Transfer Level of Assistance 2: Contact guard    Ambulation/Gait Training  Ambulation/Gait Training Performed: Yes  Ambulation/Gait Training 1  Device 1: Rolling walker  Gait Support Devices: Gait belt  Assistance 1: Contact guard  Quality of Gait 1: Wide base of support, Inconsistent stride length, Decreased step length  Comments/Distance (ft) 1: 30, 15' x 2 (VC's to keep AZIZA inside WW)    Extremity/Trunk Assessments:  RLE   RLE : Within Functional Limits  LLE   LLE : Within Functional Limits  Treatments:  Therapeutic Activity  Therapeutic Activity Performed: Yes  Therapeutic Activity 1: VC's for sequencing  Outcome Measures:  [facility] Basic Mobility  Turning from your back to your side while in a flat bed without using bedrails: A little  Moving from lying on your back to sitting on the side of a flat bed without using bedrails: A lot  Moving to and from bed to chair (including a wheelchair): A little  Standing up from a chair using your arms (e.g. wheelchair or bedside chair): A little  To walk in hospital room: A little  Climbing 3-5 steps with railing: A lot  Basic Mobility - Total Score: 16    Encounter Problems       Encounter Problems (Active)       Balance       STG - Maintains dynamic standing balance without upper extremity support with >=good balance        Start:  03/08/24    Expected End:  03/22/24               Mobility       LTG - Patient will ambulate community distance JASON with WW       Start:  03/08/24    Expected End:  03/22/24               PT Transfers       STG - Patient will perform bed mobility JASON       Start:  03/08/24    Expected End:  03/22/24            STG - Patient will transfer sit to and from stand JASON with WW       Start:   03/08/24    Expected End:  03/22/24               Pain - Adult              Education Documentation  Mobility Training, taught by Julianna Echeverria, PT at 3/8/2024 11:26 AM.  Learner: Patient  Readiness: Acceptance  Method: Explanation  Response: Verbalizes Understanding  Comment: Educated pt. on PT POC

## 2024-03-08 NOTE — PROGRESS NOTES
12:00 pm  Patient informed of recommendation for MOD level of therapy/Skilled Nursing Facility upon discharge.  Printed insurance choice list for patient.  Patient declined SNF but would like home care and choiced [facility].  Sent message thru [employer] to Dr. Aylin Suggs regarding accepting patient and overseeing home care orders.  She agreed.  Patient will need to follow up with PCP early next week.  Called office and left message to call this [facility] with an appt.    Spoke with patient's  Shelby [phone] with [facility].  Confirmed she has not ordered a bed for patient; This [facility] will order a hospital bed for patient.         12:30 PM  Sent hospital bed request thru [facility] to [facility].      OBSERVATION:   Patient medically ready for discharge.  Patient follow up information on discharge instructions.  Patient will be returning home with [facility]. Patient is in agreement with discharge plan.      12:45 pm Appt scheduled for follow up with PCP for Wed. 3/13 @ 11:15 am.      3:15 pm  Received Confirmation of order from [facility].  Faxed signed paperwork that was sent for the order of the [employer] bed; half rails.  Informed patient that [facility] will contact her today with delivery time for this evening.  Patient discharging home.

## 2024-03-08 NOTE — PROGRESS NOTES
Occupational Therapy    Evaluation    Patient Name: Mariajose Haq  MRN: [id number]  Today's Date: 3/8/2024  Time Calculation  Start Time: 0829  Stop Time: 0849  Time Calculation (min): 20 min    Assessment  IP OT Assessment  OT Assessment: Pt is a 79 y/o female presenting for a skilled OT evaluation following admission to the hospital for frequent falls and back pain. Pt displayed decreased endurance and safety with ADLs and transfers. Pt would benefit from OT at the mod intensity to increase safety [facility] at [facility].  Prognosis: Good  Barriers to Discharge: None  Evaluation/Treatment Tolerance: Patient tolerated treatment well  End of Session Communication: Bedside nurse  End of Session Patient Position: Up in chair, Alarm on  Plan:  Treatment Interventions: ADL retraining, Functional transfer training, Endurance training, Neuromuscular reeducation  OT Frequency: 3 times per week  OT Discharge Recommendations: Moderate intensity level of continued care  OT Recommended Transfer Status: Stand by assist, Assist of 1  OT - OK to Discharge: Yes  Based on completed evaluation and care plan recommendations, no barriers to discharge to next site of care.    Subjective   Current Problem:  No diagnosis found.    General:  General  Reason for Referral: ADLs/safety following back pain, BLE pain/phantom pain, and frequent falls at home  Referred By: ALENA Burger-CNP  Past Medical History Relevant to Rehab: Pt reporting she could not get out of bed at home d/t increased back pain resulting in hospital admission. (PMH: ataxia, mild cognitive impairment, depression, anxiety, HTN, sleep apnea, joint pain, seizures, CHF, rhumatoid arthritis)  Prior to Session Communication: Bedside nurse  Patient Position Received: Bed, 2 rail up, Alarm off, not on at start of session  General Comment: Pt pleseant and cooperative throughout session. Pt left with all needs in reach following  session.    Precautions:  Medical Precautions: Fall precautions    Vital Signs:  Heart Rate: 57  SpO2: 98 %    Pain:  Pain Assessment  Pain Assessment: 0-10  Pain Score: 5 - Moderate pain  Pain Type: Acute pain  Pain Location: Back  Pain Orientation: Left, Outer, Mid  Pain Interventions: Repositioned (in chair)    Objective   Cognition:  Overall Cognitive Status: Within Functional Limits (pt unable to name president; will continue to monitor)     Home Living:  Type of Home: House  Lives With:  (autistic son)  Home Adaptive Equipment: Walker rolling or standard (rollator)  Home Layout: One level, Able to live on main level with bedroom/bathroom  Home Access: Stairs to enter with rails  Entrance Stairs-Rails: Both  Entrance Stairs-Number of Steps: 4  Bathroom Shower/Tub: Tub/shower unit  Bathroom Toilet: Standard  Bathroom Equipment: Grab bars in shower, Grab bars around toilet     Prior Function:  Level of [place]: Independent with ADLs and functional transfers, Independent with homemaking with ambulation  Receives Help From:  (autistic son; friend)  ADL Assistance: Independent  Homemaking Assistance: Independent (son assist with laundry; pt has friend who cleans home once a month)  Ambulatory Assistance: Independent (uses rollator)  Vocational: Retired    IADL History:  Homemaking Responsibilities: Yes  Meal Prep Responsibility: Primary  Laundry Responsibility:  (shares with son)  Cleaning Responsibility:  (friend completes once a month)  Bill Paying/Finance Responsibility: Primary  Shopping Responsibility:  (friend drives her)   Responsibility: Primary (autistic son; pt does have HH aide that helps her son shower once a week)  Current License: No  Mode of Transportation: Friends (because of seizures)    ADL:  Eating Assistance: Independent  Grooming Assistance: Stand by  Grooming Deficit: Supervision/safety, Increased time to complete  Bathing Assistance: Minimal  Bathing Deficit: Supervision/safety,  Increased time to complete , Right lower leg including foot, Left lower leg including foot (back)  UE Dressing Deficit: Setup  LE Dressing Assistance: Stand by  LE Dressing Deficit: Supervision/safety, Setup, Increased time to complete  Toileting Assistance with Device: Minimal  Toileting Deficit: Supervison/safety, Steadying, Perineal hygiene    Activity Tolerance:  Endurance: Tolerates 10 - 20 min exercise with multiple rests    Bed Mobility/Transfers:   Bed Mobility  Bed Mobility: Yes  Bed Mobility 1  Bed Mobility 1: Supine to sitting  Level of Assistance 1: Moderate assistance, Minimal verbal cues  Bed Mobility Comments 1: pt not following VC to use bed rails with B UE to assist with transfer    Transfers  Transfer: Yes  Transfer 1  Transfer From 1: Bed to  Transfer to 1: Chair with arms  Technique 1: Sit to stand, Stand to sit  Transfer Device 1: Walker  Transfer Level of Assistance 1: Contact guard  Transfers 2  Transfer From 2: Chair with arms to  Transfer to 2: Stand, Sit  Technique 2: Sit to stand, Stand to sit  Transfer Device 2: Walker  Transfer Level of Assistance 2: Contact guard, Minimal verbal cues  Trials/Comments 2: multiple attempts completed at same level fo assist. Pt following VC for increased safety intermitantly.    Functional Mobility:  Functional Mobility  Functional Mobility Performed: Yes  Functional Mobility 1  Surface 1: Level tile  Device 1: Rolling walker  Assistance 1: Contact guard, Minimal verbal cues  Comments 1: 5ft within room; VC foir correct walker management    Sitting Balance:  Static Sitting Balance  Static Sitting-Balance Support: Feet supported, Bilateral upper extremity supported  Static Sitting-Level of Assistance: Contact guard  Dynamic Sitting Balance  Dynamic Sitting-Balance Support: No upper extremity supported, Feet supported  Dynamic Sitting-Comments: CGA    Standing Balance:  Static Standing Balance  Static Standing-Balance Support: Bilateral upper extremity  supported  Static Standing-Level of Assistance: Contact guard  Dynamic Standing Balance  Dynamic Standing-Balance Support: No upper extremity supported  Dynamic Standing-Comments: CGA     IADL's:   Homemaking Responsibilities: Yes  Meal Prep Responsibility: Primary  Laundry Responsibility:  (shares with son)  Cleaning Responsibility:  (friend completes once a month)  Bill Paying/Finance Responsibility: Primary  Shopping Responsibility:  (friend drives her)   Responsibility: Primary (autistic son; pt does have HH aide that helps her son shower once a week)  Current License: No  Mode of Transportation: Friends (because of seizures)    Vision:   Vision - Basic Assessment  Current Vision: Wears glasses all the time  Visual History: Macular degeneration    Sensation:  Sensation Comment: no sensation deficits noted     Coordination:  Movements are Fluid and Coordinated: Yes     Hand Function:  Hand Function  Gross Grasp: Functional  Coordination: Functional    Extremities:   RUE   RUE : Within Functional Limits and LUE   LUE: Within Functional Limits    Outcome Measures:   [facility] Daily Activity  Putting on and taking off regular lower body clothing: A little  Bathing (including washing, rinsing, drying): A little  Putting on and taking off regular upper body clothing: A little  Toileting, which includes using toilet, bedpan or urinal: A little  Taking care of personal grooming such as brushing teeth: A little  Eating Meals: None  Daily Activity - Total Score: 19      Education Documentation  ADL Training, taught by Elisa Lara OT at 3/8/2024 10:37 AM.  Learner: Patient  Readiness: Acceptance  Method: Explanation  Response: Verbalizes Understanding  Comment: use of call bell for needs; correct hand placement for increased safety with transfers    Education Comments  No comments found.      Goals:   Encounter Problems       Encounter Problems (Active)       ADLs       Patient will perform UB and LB  bathing with modified independent level of assistance.       Start:  03/08/24    Expected End:  03/15/24            Patient with complete lower body dressing with modified independent level of assistance donning and doffing all LE clothes with PRN adaptive equipment.       Start:  03/08/24    Expected End:  03/15/24            Patient will complete toileting including hygiene clothing management/hygiene with modified independent level of assistance.       Start:  03/08/24    Expected End:  03/15/24               BALANCE       Pt will maintain dynamic standing balance during ADL task with supervision level of assistance in order to demonstrate decreased risk of falling and improved postural control.       Start:  03/08/24    Expected End:  03/15/24               TRANSFERS       Patient will perform bed mobility modified independent level of assistance in order to improve safety and independence with mobility.       Start:  03/08/24    Expected End:  03/15/24            Patient will complete sit to stand transfer with least restrictive device with modified independent level of assistance.       Start:  03/08/24    Expected End:  03/15/24

## 2024-03-08 NOTE — HH CARE COORDINATION
Home Care received a Referral for Nursing, Physical Therapy, and Occupational Therapy. We have processed the referral for a Start of Care on 3.9-10.24.     If you have any questions or concerns, please feel free to contact us at [phone]. Follow the prompts, enter your five digit zip code, and you will be directed to your care team on E[facility] 2.

## 2024-03-09 ENCOUNTER — HOME CARE VISIT (OUTPATIENT)
Dept: HOME HEALTH SERVICES | Facility: HOME HEALTH | Age: 79
End: 2024-03-09

## 2024-03-09 LAB — BACTERIA UR CULT: NO GROWTH

## 2024-03-09 NOTE — CASE COMMUNICATION
Please update this patient's city to [place], not [place].  Thanks, just want to make sure that we have the correct address in the computer.

## 2024-03-10 LAB
ATRIAL RATE: 63 BPM
P AXIS: 64 DEGREES
P OFFSET: 183 MS
P ONSET: 120 MS
PR INTERVAL: 192 MS
Q ONSET: 216 MS
QRS COUNT: 11 BEATS
QRS DURATION: 102 MS
QT INTERVAL: 426 MS
QTC CALCULATION(BAZETT): 435 MS
QTC FREDERICIA: 433 MS
R AXIS: -11 DEGREES
T AXIS: 32 DEGREES
T OFFSET: 429 MS
VENTRICULAR RATE: 63 BPM

## 2024-03-11 ENCOUNTER — PATIENT OUTREACH (OUTPATIENT)
Dept: CARE COORDINATION | Facility: CLINIC | Age: 79
End: 2024-03-11
Payer: MEDICARE

## 2024-03-11 SDOH — SOCIAL STABILITY: SOCIAL NETWORK: ARE YOU MARRIED, WIDOWED, DIVORCED, SEPARATED, NEVER MARRIED, OR LIVING WITH A PARTNER?: WIDOWED

## 2024-03-11 SDOH — ECONOMIC STABILITY: GENERAL: WOULD YOU LIKE HELP WITH ANY OF THE FOLLOWING NEEDS?: I DONT NEED HELP WITH ANY OF THESE

## 2024-03-11 NOTE — PROGRESS NOTES
Admit OBSV to [facility] 3/7 to 3/8 with recurrent falls, back pain, and bilateral leg/phantom pain.   Discharged from rehab in [place] 2 wks prior and had declined home care at that time.   Declined SNF on this episode of care.   Home with  home care  RN/PT/OT ( start of care 3/9) with hospital bed on Prednisone, complete course of Bactrim for UTI, Lidocaine patch,    Appt with PCP 3/13.   PMH: heart failure, HTN seizure disorder, RA.   Lives with adult autistic son.  Son has a caregiver once a week.   Friends provide transportation.   [email or web address]     Engagement  Call Start Time: 1131 (3/11/2024 11:31 AM)    Medications  Medications reviewed with patient/caregiver?: Yes (3/11/2024 11:31 AM)  Is the patient having any side effects they believe may be caused by any medication additions or changes?: (!) Yes (Patient states Lidocaine patch makes her dizzy and removed it this morning.) (3/11/2024 11:31 AM)  Does the patient have all medications ordered at discharge?: Yes (3/11/2024 11:31 AM)  Care Management Interventions: Advised patient to call provider (Educated patient to inform PCP of Lidocaine patch side effect at appt 3/13) (3/11/2024 11:31 AM)    Appointments  Does the patient have a primary care provider?: Yes (3/11/2024 11:31 AM)  Care Management Interventions: Verified appointment date/time/provider (3/11/2024 11:31 AM)    Self Management  What is the home health agency?:  patient deferred start of care until 3/12 (3/11/2024 11:31 AM)  Has all Durable Medical Equipment (DME) been delivered?: Yes (pt states hospital bed delivered) (3/11/2024 11:31 AM)    Patient Teaching  Does the patient have access to their discharge instructions?: Yes (3/11/2024 11:31 AM)  Care Management Interventions: Reviewed instructions with patient (3/11/2024 11:31 AM)  What is the patient's perception of their health status since discharge?: Improving (3/11/2024 11:31 AM)  Is the patient/caregiver able to teach back the hierarchy of  who to call/visit for symptoms/problems? PCP, Specialist, Home Health nurse, Urgent Care, ED, 911: Yes (3/11/2024 11:31 AM)    Wrap Up  Call End Time: 1133 (3/11/2024 11:31 AM)      Successful transition of care outreach. Enrolled in [employer] chat to monitor for 30 day transition period and will outreach if issues arise.    Angy CALDWELL RN CCM  RN Care Coordinator  [facility]  Phone [phone]

## 2024-03-13 ENCOUNTER — APPOINTMENT (OUTPATIENT)
Dept: NEUROLOGY | Facility: CLINIC | Age: 79
End: 2024-03-13
Payer: MEDICARE

## 2024-03-13 ENCOUNTER — OFFICE VISIT (OUTPATIENT)
Dept: PRIMARY CARE | Facility: CLINIC | Age: 79
End: 2024-03-13
Payer: MEDICARE

## 2024-03-13 VITALS
BODY MASS INDEX: 36.14 KG/M2 | HEART RATE: 81 BPM | WEIGHT: 204 LBS | OXYGEN SATURATION: 98 % | DIASTOLIC BLOOD PRESSURE: 80 MMHG | TEMPERATURE: 96.1 F | SYSTOLIC BLOOD PRESSURE: 152 MMHG

## 2024-03-13 DIAGNOSIS — R35.0 URINARY FREQUENCY: ICD-10-CM

## 2024-03-13 DIAGNOSIS — R26.9 GAIT DISORDER: ICD-10-CM

## 2024-03-13 DIAGNOSIS — R29.6 FREQUENT FALLS: Primary | ICD-10-CM

## 2024-03-13 PROCEDURE — 99496 TRANSJ CARE MGMT HIGH F2F 7D: CPT | Performed by: FAMILY MEDICINE

## 2024-03-13 PROCEDURE — 1157F ADVNC CARE PLAN IN RCRD: CPT | Performed by: FAMILY MEDICINE

## 2024-03-13 PROCEDURE — 3079F DIAST BP 80-89 MM HG: CPT | Performed by: FAMILY MEDICINE

## 2024-03-13 PROCEDURE — 1036F TOBACCO NON-USER: CPT | Performed by: FAMILY MEDICINE

## 2024-03-13 PROCEDURE — 1159F MED LIST DOCD IN RCRD: CPT | Performed by: FAMILY MEDICINE

## 2024-03-13 PROCEDURE — 3077F SYST BP >= 140 MM HG: CPT | Performed by: FAMILY MEDICINE

## 2024-03-13 ASSESSMENT — ENCOUNTER SYMPTOMS
BLOOD IN STOOL: 0
VOMITING: 0
DIZZINESS: 1
HEMATURIA: 0
LIGHT-HEADEDNESS: 1
FLANK PAIN: 0
DIARRHEA: 0
WHEEZING: 0
SHORTNESS OF BREATH: 1
EYE DISCHARGE: 0
APPETITE CHANGE: 0
EYE ITCHING: 0
DYSURIA: 1
ABDOMINAL PAIN: 0
DYSPHORIC MOOD: 0
SORE THROAT: 0
NAUSEA: 0
RHINORRHEA: 0
FEVER: 0
CONFUSION: 1
NUMBNESS: 0
JOINT SWELLING: 0
COUGH: 1
NERVOUS/ANXIOUS: 0
SINUS PRESSURE: 0
UNEXPECTED WEIGHT CHANGE: 0
PALPITATIONS: 0
ACTIVITY CHANGE: 0
HEADACHES: 0
CONSTIPATION: 0
MYALGIAS: 0
SLEEP DISTURBANCE: 0
WEAKNESS: 1

## 2024-03-13 NOTE — PROGRESS NOTES
"Subjective   Patient ID: Mariajose Haq is a 78 y.o. female who presents for Hospital Follow-up (GENEVA-DIZZINESS AND FALL. IMAGING DONE. URINE-TESTED FOR UTI.KIDNEY INFECTION. FELL AGAIN WEDNESDAY-NOT TAKEN THIS TIME- TREATED AND MED COMPLETED YESTERDAY. THURSDAY MORNING COULDN'T MOVE WHEN SHE WOKE UP.  UNABLE TO GET OUT OF BED-WAS TAKEN TO THE ER./CONTINUED PROBLEMS-UNSURE IF URINE IS CLEARED. BLACKING OUT-HX OF SEIZURES.).    HPI HISTORY OF CHF AND   AORTIC STENOSIS   ANEURYSM AND HAD SURGERY   SEIZURE /SEE DR TELLEZ NEUROLOGY   LABS REVIEWED AND SHE IS ANEMIC BUT IRON IS ELEVATED   SODIUM IS LOW   PROTEIN ELEVATED IN SERUM ONE X BUT NO PROTEINURIA   HER UA ABNORMAL BUT NO BACTERIAL GROWTH     Review of Systems   Constitutional:  Negative for activity change, appetite change, fever and unexpected weight change.   HENT:  Negative for congestion, ear pain, postnasal drip, rhinorrhea, sinus pressure and sore throat.    Eyes:  Negative for discharge, itching and visual disturbance.   Respiratory:  Positive for cough and shortness of breath. Negative for wheezing.    Cardiovascular:  Negative for chest pain, palpitations and leg swelling.        PASSING OUT \"SEIZURES\" SEES DR TELLEZ    Gastrointestinal:  Negative for abdominal pain, blood in stool, constipation, diarrhea, nausea and vomiting.   Endocrine: Negative for cold intolerance, heat intolerance and polyuria.   Genitourinary:  Positive for dysuria. Negative for flank pain and hematuria.   Musculoskeletal:  Positive for arthralgias and back pain. Negative for gait problem, joint swelling and myalgias.   Skin:  Negative for rash.   Allergic/Immunologic: Negative for environmental allergies and food allergies.   Neurological:  Positive for dizziness, syncope, weakness and light-headedness. Negative for numbness and headaches.   Psychiatric/Behavioral:  Positive for confusion. Negative for dysphoric mood and sleep disturbance. The patient is not " nervous/anxious.        Objective   /80   Pulse 81   Temp 35.6 °C (96.1 °F)   Wt 92.5 kg (204 lb)   SpO2 98%   BMI 36.14 kg/m²     Physical Exam  Vitals and nursing note reviewed.   Constitutional:       Appearance: She is obese. She is ill-appearing.   HENT:      Head: Normocephalic.      Nose: Congestion present.      Mouth/Throat:      Mouth: Mucous membranes are dry.   Cardiovascular:      Rate and Rhythm: Normal rate and regular rhythm.      Pulses: Normal pulses.      Heart sounds: Normal heart sounds. No murmur heard.     No friction rub. No gallop.   Pulmonary:      Effort: Pulmonary effort is normal. No respiratory distress.      Breath sounds: Rhonchi present. No wheezing.      Comments: DRY COUGH AND CONGESTED   Abdominal:      General: Bowel sounds are normal. There is no distension.      Palpations: Abdomen is soft.      Tenderness: There is no abdominal tenderness.   Musculoskeletal:         General: Tenderness present. No deformity.      Comments: DECONDITIONED AND BACK PAIN GENERALIZED    Skin:     General: Skin is warm and dry.      Capillary Refill: Capillary refill takes less than 2 seconds.   Neurological:      General: No focal deficit present.      Mental Status: She is alert and oriented to person, place, and time.      Motor: Weakness present.      Gait: Gait abnormal.      Comments: ABNORMAL SPEECH    Psychiatric:      Comments: DYSPHORIC MOOD  GENERALIZED WEAKNESS AN D FREQUENT FALLS          Assessment/Plan   Diagnoses and all orders for this visit:  Frequent falls  Urinary frequency  -     POCT UA Automated manually resulted  Gait disorder

## 2024-03-14 ENCOUNTER — HOME CARE VISIT (OUTPATIENT)
Dept: HOME HEALTH SERVICES | Facility: HOME HEALTH | Age: 79
End: 2024-03-14
Payer: MEDICARE

## 2024-03-14 PROCEDURE — 169592 NO-PAY CLAIM PROCEDURE

## 2024-03-14 PROCEDURE — 0023 HH SOC

## 2024-03-14 PROCEDURE — G0299 HHS/HOSPICE OF RN EA 15 MIN: HCPCS | Mod: HHH

## 2024-03-14 PROCEDURE — 1090000001 HH PPS REVENUE CREDIT

## 2024-03-14 PROCEDURE — 1090000002 HH PPS REVENUE DEBIT

## 2024-03-15 PROCEDURE — 1090000001 HH PPS REVENUE CREDIT

## 2024-03-15 PROCEDURE — 1090000002 HH PPS REVENUE DEBIT

## 2024-03-16 VITALS
TEMPERATURE: 98.1 F | SYSTOLIC BLOOD PRESSURE: 118 MMHG | HEART RATE: 66 BPM | OXYGEN SATURATION: 97 % | BODY MASS INDEX: 36.44 KG/M2 | DIASTOLIC BLOOD PRESSURE: 62 MMHG | WEIGHT: 198 LBS | HEIGHT: 62 IN | RESPIRATION RATE: 20 BRPM

## 2024-03-16 PROCEDURE — 1090000001 HH PPS REVENUE CREDIT

## 2024-03-16 PROCEDURE — 1090000002 HH PPS REVENUE DEBIT

## 2024-03-16 ASSESSMENT — ACTIVITIES OF DAILY LIVING (ADL)
TOILETING: 1
TOILETING: TWO PERSON
OASIS_M1830: 03
AMBULATION ASSISTANCE: TWO PERSON
AMBULATION ASSISTANCE: 1
CURRENT_FUNCTION: TWO PERSON
ENTERING_EXITING_HOME: TWO PERSON
PHYSICAL TRANSFERS ASSESSED: 1

## 2024-03-16 ASSESSMENT — ENCOUNTER SYMPTOMS
HIGHEST PAIN SEVERITY IN PAST 24 HOURS: 0/10
PAIN SEVERITY GOAL: 0/10
CHANGE IN APPETITE: UNCHANGED
LOSS OF SENSATION IN FEET: 0
DENIES PAIN: 1
DEPRESSION: 0
LIMITED RANGE OF MOTION: 1
FATIGUES EASILY: 1
APPETITE LEVEL: GOOD
PERSON REPORTING PAIN: PATIENT
MUSCLE WEAKNESS: 1
OCCASIONAL FEELINGS OF UNSTEADINESS: 1
SEIZURES: 1
LOWEST PAIN SEVERITY IN PAST 24 HOURS: 0/10

## 2024-03-16 NOTE — CASE COMMUNICATION
Please remove and/or update - patient's primary diagnosis says G89.29 other chronic pain.  She denies pain, denies chronic pain.    If unable to remove/please list another primary diagnosis, as chronic pain should not be primary diagnosis.    Thanks!    Patient does take ibuprofen daily and I questioned her about that. She states she still does not have pain, and that she takes that preventatively for arthritis.

## 2024-03-16 NOTE — HOME HEALTH
Patient is identified by name and .    Reached out to office as well as referring physician and PCP, at the start of care, because the patient's admitting diagnosis is chronic pain. Patient states immediately that she has no pain, so definitely does not have any chronic pain.  She is requesting for that to be changed.    Since then, office and hospital staff have updated other diagnoses, but I will not be using pain in the care plan, as she does not have any pain.  (in the event that it's not removed before then).    Patient's home is one -floor.   She lives at home with her adult (40 year old) disabled/autistic/non-verbal son, who is home with her in the evenings, and nights. He is able to assist with meals and getting her items that she may need, but he is not able to assist with her care.    During the day, he attends full time workshop and is not home.    She uses public transportation and has  a friend named Katie that takes her grocery shopping.  She also gets assistance from [employer] that provides [employer]/caregiver to come over to assist with showering her son, but also her if needed. They also have a cleaning lady from [employer]. They decline meals.    She has a shower chair and multiple grab bars.  She has a walker, rollator, canes. Doesnt use one more than the other, and does not use consistently. She states mainly for when she goes outside of the home. Does not use inside.    In 2016, her  passed away.    At the start of care, she questioned why nursing was ordered, and she stated she was only wanting PT and OT to assist with her care.    ALthough after talking with her, she agreed to nurse to get her started, do teaching and review medications.    [facility] also [facility] and requests no visits on Sundays.    Additionally, her appetite is good, fluid intake good.  She has  an updated list of all of her medications.  She keeps a calendar for all of her appointments.    No  n/v/d  There have been multiple falls in the home. Patient expresses blacking out, not remembering what happened. She has had a seizure disorder since 8, so none of this is new she expresses.  Her neurologist in the hospital, thinks it's cardio-related, but she states she will follow up  with her neurologist for the seizures.

## 2024-03-16 NOTE — CASE COMMUNICATION
Patient states she only wanted physical and occupational therapies.  Did not want or feel a need for nursing.    Although after talking with her, she agreed to nursing so that PT and OT can get started, but she wants to plan for nursing discharge after that if there are no further issues/concerns.

## 2024-03-16 NOTE — CASE COMMUNICATION
Patient agreeable to skilled nursing, physical and occupational therapies.    For visits next week, please no visits on Thursday or Friday due to her appointments she has scheduled.

## 2024-03-17 PROCEDURE — 1090000001 HH PPS REVENUE CREDIT

## 2024-03-17 PROCEDURE — 1090000002 HH PPS REVENUE DEBIT

## 2024-03-18 ENCOUNTER — HOME CARE VISIT (OUTPATIENT)
Dept: HOME HEALTH SERVICES | Facility: HOME HEALTH | Age: 79
End: 2024-03-18
Payer: MEDICARE

## 2024-03-18 ENCOUNTER — PATIENT OUTREACH (OUTPATIENT)
Dept: CARE COORDINATION | Facility: CLINIC | Age: 79
End: 2024-03-18
Payer: MEDICARE

## 2024-03-18 VITALS
OXYGEN SATURATION: 99 % | SYSTOLIC BLOOD PRESSURE: 160 MMHG | DIASTOLIC BLOOD PRESSURE: 60 MMHG | HEART RATE: 62 BPM | TEMPERATURE: 97.1 F

## 2024-03-18 VITALS — SYSTOLIC BLOOD PRESSURE: 150 MMHG | DIASTOLIC BLOOD PRESSURE: 76 MMHG

## 2024-03-18 PROCEDURE — 1090000002 HH PPS REVENUE DEBIT

## 2024-03-18 PROCEDURE — G0152 HHCP-SERV OF OT,EA 15 MIN: HCPCS | Mod: HHH

## 2024-03-18 PROCEDURE — 1090000001 HH PPS REVENUE CREDIT

## 2024-03-18 PROCEDURE — G0151 HHCP-SERV OF PT,EA 15 MIN: HCPCS | Mod: HHH

## 2024-03-18 SDOH — HEALTH STABILITY: PHYSICAL HEALTH
EXERCISE COMMENTS: RODE STATIONARY BIKE FOR 5 MINUTES. PATIENTS LEGS WERE TIRED AND HAD SOME DIFFICULTY GETTING TO THE CHAIR AFTER. ENCOURAGED USE OF THE ROLLATOR IN THE HOME.

## 2024-03-18 ASSESSMENT — ACTIVITIES OF DAILY LIVING (ADL)
GROOMING ASSESSED: 1
PHYSICAL TRANSFERS ASSESSED: 1
TOILETING: 1
AMBULATION ASSISTANCE: 1
BATHING ASSESSED: 1
AMBULATION ASSISTANCE: SUPERVISION
GROOMING_CURRENT_FUNCTION: INDEPENDENT
BATHING_CURRENT_FUNCTION: CONTACT GUARD ASSIST
CURRENT_FUNCTION: MINIMUM ASSIST
TOILETING: SUPERVISION
CURRENT_FUNCTION: SUPERVISION
PHYSICAL TRANSFERS ASSESSED: 1
AMBULATION ASSISTANCE: 1
DRESSING_LB_CURRENT_FUNCTION: INDEPENDENT
DRESSING_UB_CURRENT_FUNCTION: INDEPENDENT
AMBULATION ASSISTANCE ON FLAT SURFACES: 1
AMBULATION ASSISTANCE: MINIMUM ASSIST

## 2024-03-18 ASSESSMENT — ENCOUNTER SYMPTOMS
LOSS OF SENSATION IN FEET: 0
FORGETFULNESS: 1
OCCASIONAL FEELINGS OF UNSTEADINESS: 1
DEPRESSION: 0
PERSON REPORTING PAIN: PATIENT
DENIES PAIN: 1
DESCRIPTION OF MEMORY LOSS: SHORT TERM
PERSON REPORTING PAIN: PATIENT
PAIN: DENIES ANY PAIN
DENIES PAIN: 1

## 2024-03-18 NOTE — PROGRESS NOTES
Attempted post hospital follow up outreach; left voice mail message.  Reviewed 3/13 PCP note.   Per chart, home care start of care 3/14.  Will continue to monitor for 30 day transition period and outreach if issues arise.    Angy CALDWELL RN CCM  RN Care Coordinator  [facility]  Phone [phone]

## 2024-03-18 NOTE — CASE COMMUNICATION
Patient seen for PT evaluation. Patient demonstrates impaired balance and gait. States she uses the walls to walk around home and rollator outside the home. Encouraged to use the rollator in the home and remember to wear med alert. Patient had 3 falls in a row recently due to lob and dizziness.Benefits from therapy to improve balance and decrease falls. Lives with autistic son who works during the day and relies on friends and [employer] for tranportation.

## 2024-03-19 PROCEDURE — 1090000002 HH PPS REVENUE DEBIT

## 2024-03-19 PROCEDURE — 1090000001 HH PPS REVENUE CREDIT

## 2024-03-20 PROCEDURE — 1090000002 HH PPS REVENUE DEBIT

## 2024-03-20 PROCEDURE — 1090000001 HH PPS REVENUE CREDIT

## 2024-03-21 ENCOUNTER — HOME CARE VISIT (OUTPATIENT)
Dept: HOME HEALTH SERVICES | Facility: HOME HEALTH | Age: 79
End: 2024-03-21
Payer: MEDICARE

## 2024-03-21 PROCEDURE — 1090000001 HH PPS REVENUE CREDIT

## 2024-03-21 PROCEDURE — G0299 HHS/HOSPICE OF RN EA 15 MIN: HCPCS | Mod: HHH

## 2024-03-21 PROCEDURE — 1090000002 HH PPS REVENUE DEBIT

## 2024-03-21 ASSESSMENT — ENCOUNTER SYMPTOMS
ARTHRALGIAS: 1
BACK PAIN: 1

## 2024-03-22 ENCOUNTER — OFFICE VISIT (OUTPATIENT)
Dept: OBSTETRICS AND GYNECOLOGY | Facility: CLINIC | Age: 79
End: 2024-03-22
Payer: MEDICARE

## 2024-03-22 VITALS
RESPIRATION RATE: 20 BRPM | HEART RATE: 70 BPM | DIASTOLIC BLOOD PRESSURE: 62 MMHG | TEMPERATURE: 98 F | OXYGEN SATURATION: 97 % | SYSTOLIC BLOOD PRESSURE: 120 MMHG

## 2024-03-22 VITALS
SYSTOLIC BLOOD PRESSURE: 145 MMHG | WEIGHT: 216 LBS | BODY MASS INDEX: 42.41 KG/M2 | DIASTOLIC BLOOD PRESSURE: 73 MMHG | HEIGHT: 60 IN

## 2024-03-22 DIAGNOSIS — N39.41 URGE URINARY INCONTINENCE: Primary | ICD-10-CM

## 2024-03-22 PROCEDURE — 1157F ADVNC CARE PLAN IN RCRD: CPT | Performed by: NURSE PRACTITIONER

## 2024-03-22 PROCEDURE — 99212 OFFICE O/P EST SF 10 MIN: CPT | Performed by: NURSE PRACTITIONER

## 2024-03-22 PROCEDURE — 3078F DIAST BP <80 MM HG: CPT | Performed by: NURSE PRACTITIONER

## 2024-03-22 PROCEDURE — 3077F SYST BP >= 140 MM HG: CPT | Performed by: NURSE PRACTITIONER

## 2024-03-22 PROCEDURE — 1159F MED LIST DOCD IN RCRD: CPT | Performed by: NURSE PRACTITIONER

## 2024-03-22 PROCEDURE — 1090000001 HH PPS REVENUE CREDIT

## 2024-03-22 PROCEDURE — 1036F TOBACCO NON-USER: CPT | Performed by: NURSE PRACTITIONER

## 2024-03-22 PROCEDURE — 1090000002 HH PPS REVENUE DEBIT

## 2024-03-22 ASSESSMENT — ACTIVITIES OF DAILY LIVING (ADL)
CURRENT_FUNCTION: TWO PERSON
PHYSICAL TRANSFERS ASSESSED: 1
AMBULATION ASSISTANCE: 1
AMBULATION ASSISTANCE: TWO PERSON
ADLS_COMMENTS: WALKER, CANE

## 2024-03-22 ASSESSMENT — ENCOUNTER SYMPTOMS
PERSON REPORTING PAIN: PATIENT
MUSCLE WEAKNESS: 1
APPETITE LEVEL: GOOD
CHANGE IN APPETITE: UNCHANGED
DENIES PAIN: 1
LIMITED RANGE OF MOTION: 1

## 2024-03-22 ASSESSMENT — PAIN SCALES - PAIN ASSESSMENT IN ADVANCED DEMENTIA (PAINAD)
NEGVOCALIZATION: 1
NEGVOCALIZATION: 1 - OCCASIONAL MOAN OR GROAN. LOW-LEVEL SPEECH WITH A NEGATIVE OR DISAPPROVING QUALITY.

## 2024-03-22 NOTE — CASE COMMUNICATION
Per her request to only have one visit for start of care , and one additional visit for nursing discharge (and seeing as there are no further skilled needs), patient is discharged from skilled nursing.    PT and OT will remain in at this time.    Should any issues arise, please request a new nursing evaluation.

## 2024-03-22 NOTE — PROGRESS NOTES
Subjective   Patient ID: Mariajose Haq is a 78 y.o. female who presents for OAB     Urinary symptoms:  - Neurologist DC'D her Trospium because she needs a special test due to seizures.   - Denies dysuria   - Reports frequency and urgency since stopping Trospium     Interval history:  -  Has had a few recent falls     Record Review:  - Last seen 8/11/2023 by Magali GOLDMAN for OAB and a UTI, advised to continue Trospium 20MG PO BID and Rx'd Macrobid 100MG PO BID X7 days   - Tried and failed Myrbetriq     Review of Systems    Objective   OBGyn Exam    Assessment/Plan   Assessment:   78 y.o. old female being assessed for OAB    Diagnoses:   #1 OAB    Plan:   OAB  - Discussed treatment options such as resume medication therapy, botox injections or Interstim placement   However, probably best to not resume anti cholinergic medication.   - Informational handout over botox provided to patient      Follow-up with Dr. Hinojosa to discuss botox injections      Scribe Attestation  By signing my name below, I, Reyna Batista , Scribe   attest that this documentation has been prepared under the direction and in the presence of JONES Leo.   I, JONES Leo, personally performed the services described in this documentation which was scribed virtually and I confirm that it is both accurate and complete.

## 2024-03-23 PROCEDURE — 1090000002 HH PPS REVENUE DEBIT

## 2024-03-23 PROCEDURE — 1090000001 HH PPS REVENUE CREDIT

## 2024-03-24 PROCEDURE — 1090000002 HH PPS REVENUE DEBIT

## 2024-03-24 PROCEDURE — 1090000001 HH PPS REVENUE CREDIT

## 2024-03-25 ENCOUNTER — HOME CARE VISIT (OUTPATIENT)
Dept: HOME HEALTH SERVICES | Facility: HOME HEALTH | Age: 79
End: 2024-03-25
Payer: MEDICARE

## 2024-03-25 VITALS
DIASTOLIC BLOOD PRESSURE: 100 MMHG | SYSTOLIC BLOOD PRESSURE: 150 MMHG | HEART RATE: 62 BPM | OXYGEN SATURATION: 100 % | RESPIRATION RATE: 18 BRPM | TEMPERATURE: 97 F

## 2024-03-25 PROCEDURE — 1090000002 HH PPS REVENUE DEBIT

## 2024-03-25 PROCEDURE — G0158 HHC OT ASSISTANT EA 15: HCPCS | Mod: HHH

## 2024-03-25 PROCEDURE — 1090000001 HH PPS REVENUE CREDIT

## 2024-03-25 ASSESSMENT — ENCOUNTER SYMPTOMS
PERSON REPORTING PAIN: PATIENT
DENIES PAIN: 1

## 2024-03-26 ENCOUNTER — HOME CARE VISIT (OUTPATIENT)
Dept: HOME HEALTH SERVICES | Facility: HOME HEALTH | Age: 79
End: 2024-03-26
Payer: MEDICARE

## 2024-03-26 VITALS
HEART RATE: 64 BPM | SYSTOLIC BLOOD PRESSURE: 140 MMHG | TEMPERATURE: 97.5 F | DIASTOLIC BLOOD PRESSURE: 80 MMHG | RESPIRATION RATE: 16 BRPM | OXYGEN SATURATION: 99 %

## 2024-03-26 PROCEDURE — 1090000002 HH PPS REVENUE DEBIT

## 2024-03-26 PROCEDURE — G0157 HHC PT ASSISTANT EA 15: HCPCS | Mod: HHH

## 2024-03-26 PROCEDURE — 1090000001 HH PPS REVENUE CREDIT

## 2024-03-26 ASSESSMENT — ENCOUNTER SYMPTOMS: DENIES PAIN: 1

## 2024-03-27 ENCOUNTER — OFFICE VISIT (OUTPATIENT)
Dept: PRIMARY CARE | Facility: CLINIC | Age: 79
End: 2024-03-27
Payer: MEDICARE

## 2024-03-27 VITALS
HEART RATE: 76 BPM | SYSTOLIC BLOOD PRESSURE: 138 MMHG | DIASTOLIC BLOOD PRESSURE: 72 MMHG | OXYGEN SATURATION: 97 % | TEMPERATURE: 96.7 F | BODY MASS INDEX: 42.97 KG/M2 | WEIGHT: 220 LBS

## 2024-03-27 DIAGNOSIS — R55 SYNCOPE AND COLLAPSE: Primary | ICD-10-CM

## 2024-03-27 PROCEDURE — 3075F SYST BP GE 130 - 139MM HG: CPT | Performed by: FAMILY MEDICINE

## 2024-03-27 PROCEDURE — 1090000001 HH PPS REVENUE CREDIT

## 2024-03-27 PROCEDURE — 1159F MED LIST DOCD IN RCRD: CPT | Performed by: FAMILY MEDICINE

## 2024-03-27 PROCEDURE — 99214 OFFICE O/P EST MOD 30 MIN: CPT | Performed by: FAMILY MEDICINE

## 2024-03-27 PROCEDURE — 3078F DIAST BP <80 MM HG: CPT | Performed by: FAMILY MEDICINE

## 2024-03-27 PROCEDURE — 1157F ADVNC CARE PLAN IN RCRD: CPT | Performed by: FAMILY MEDICINE

## 2024-03-27 PROCEDURE — 1036F TOBACCO NON-USER: CPT | Performed by: FAMILY MEDICINE

## 2024-03-27 PROCEDURE — 1090000002 HH PPS REVENUE DEBIT

## 2024-03-27 ASSESSMENT — ENCOUNTER SYMPTOMS
LIGHT-HEADEDNESS: 0
EYE DISCHARGE: 0
DIZZINESS: 1
VOMITING: 0
SLEEP DISTURBANCE: 0
ABDOMINAL PAIN: 0
SINUS PRESSURE: 0
CONSTIPATION: 0
DYSPHORIC MOOD: 0
NERVOUS/ANXIOUS: 0
RHINORRHEA: 0
DYSURIA: 0
UNEXPECTED WEIGHT CHANGE: 0
BLOOD IN STOOL: 0
WHEEZING: 0
WEAKNESS: 0
DIARRHEA: 0
ACTIVITY CHANGE: 0
SEIZURES: 1
COUGH: 0
MYALGIAS: 0
JOINT SWELLING: 0
HEMATURIA: 0
SORE THROAT: 0
FEVER: 0
APPETITE CHANGE: 0
SHORTNESS OF BREATH: 0
NUMBNESS: 0
NAUSEA: 0
PALPITATIONS: 0
EYE ITCHING: 0
FLANK PAIN: 0
ARTHRALGIAS: 0
HEADACHES: 0

## 2024-03-27 NOTE — PROGRESS NOTES
Subjective   Patient ID: Mariajose Haq is a 78 y.o. female who presents for Follow-up (Frequent falls) and Dizziness (On and off 2 times over the last 2 weeks.).    Dizziness  Pertinent negatives include no abdominal pain, arthralgias, chest pain, congestion, coughing, fever, headaches, joint swelling, myalgias, nausea, numbness, rash, sore throat, vomiting or weakness.    seizures and stroke   Recent dizzy but antivert helped     Review of Systems   Constitutional:  Negative for activity change, appetite change, fever and unexpected weight change.   HENT:  Negative for congestion, ear pain, postnasal drip, rhinorrhea, sinus pressure and sore throat.    Eyes:  Negative for discharge, itching and visual disturbance.   Respiratory:  Negative for cough, shortness of breath and wheezing.    Cardiovascular:  Negative for chest pain, palpitations and leg swelling.   Gastrointestinal:  Negative for abdominal pain, blood in stool, constipation, diarrhea, nausea and vomiting.   Endocrine: Negative for cold intolerance, heat intolerance and polyuria.   Genitourinary:  Negative for dysuria, flank pain and hematuria.   Musculoskeletal:  Negative for arthralgias, gait problem, joint swelling and myalgias.   Skin:  Negative for rash.   Allergic/Immunologic: Negative for environmental allergies and food allergies.   Neurological:  Positive for dizziness, seizures and syncope. Negative for weakness, light-headedness, numbness and headaches.   Psychiatric/Behavioral:  Negative for dysphoric mood and sleep disturbance. The patient is not nervous/anxious.        Objective   /72   Pulse 76   Temp 35.9 °C (96.7 °F)   Wt 99.8 kg (220 lb)   SpO2 97%   BMI 42.97 kg/m²     Physical Exam  Vitals and nursing note reviewed.   Constitutional:       Appearance: Normal appearance.   HENT:      Head: Normocephalic.      Mouth/Throat:      Mouth: Mucous membranes are moist.   Cardiovascular:      Rate and Rhythm: Normal rate and  regular rhythm.      Pulses: Normal pulses.      Heart sounds: Normal heart sounds. No murmur heard.     No friction rub. No gallop.   Pulmonary:      Effort: Pulmonary effort is normal. No respiratory distress.      Breath sounds: Normal breath sounds. No wheezing.   Abdominal:      General: Bowel sounds are normal. There is no distension.      Palpations: Abdomen is soft.      Tenderness: There is no abdominal tenderness.   Musculoskeletal:         General: No deformity. Normal range of motion.   Skin:     General: Skin is warm and dry.      Capillary Refill: Capillary refill takes less than 2 seconds.   Neurological:      General: No focal deficit present.      Mental Status: She is alert and oriented to person, place, and time.      Gait: Gait abnormal.      Comments: Halting speech    Psychiatric:         Mood and Affect: Mood normal.       Assessment/Plan   Diagnoses and all orders for this visit:  Syncope and collapse

## 2024-03-28 ENCOUNTER — HOME CARE VISIT (OUTPATIENT)
Dept: HOME HEALTH SERVICES | Facility: HOME HEALTH | Age: 79
End: 2024-03-28
Payer: MEDICARE

## 2024-03-28 PROCEDURE — 1090000002 HH PPS REVENUE DEBIT

## 2024-03-28 PROCEDURE — 1090000001 HH PPS REVENUE CREDIT

## 2024-03-29 PROCEDURE — 1090000001 HH PPS REVENUE CREDIT

## 2024-03-29 PROCEDURE — 1090000002 HH PPS REVENUE DEBIT

## 2024-03-30 PROCEDURE — 1090000002 HH PPS REVENUE DEBIT

## 2024-03-30 PROCEDURE — 1090000001 HH PPS REVENUE CREDIT

## 2024-03-31 PROCEDURE — 1090000002 HH PPS REVENUE DEBIT

## 2024-03-31 PROCEDURE — 1090000001 HH PPS REVENUE CREDIT

## 2024-04-01 ENCOUNTER — HOME CARE VISIT (OUTPATIENT)
Dept: HOME HEALTH SERVICES | Facility: HOME HEALTH | Age: 79
End: 2024-04-01
Payer: MEDICARE

## 2024-04-01 VITALS
RESPIRATION RATE: 18 BRPM | TEMPERATURE: 97 F | DIASTOLIC BLOOD PRESSURE: 80 MMHG | HEART RATE: 65 BPM | OXYGEN SATURATION: 98 % | SYSTOLIC BLOOD PRESSURE: 140 MMHG

## 2024-04-01 DIAGNOSIS — I10 ESSENTIAL (PRIMARY) HYPERTENSION: ICD-10-CM

## 2024-04-01 PROCEDURE — 1090000002 HH PPS REVENUE DEBIT

## 2024-04-01 PROCEDURE — G0158 HHC OT ASSISTANT EA 15: HCPCS | Mod: HHH

## 2024-04-01 PROCEDURE — 1090000001 HH PPS REVENUE CREDIT

## 2024-04-01 ASSESSMENT — ENCOUNTER SYMPTOMS
PERSON REPORTING PAIN: PATIENT
DENIES PAIN: 1

## 2024-04-02 DIAGNOSIS — M06.9 RHEUMATOID ARTHRITIS, UNSPECIFIED (MULTI): ICD-10-CM

## 2024-04-02 PROCEDURE — 1090000002 HH PPS REVENUE DEBIT

## 2024-04-02 PROCEDURE — 1090000001 HH PPS REVENUE CREDIT

## 2024-04-02 RX ORDER — ATORVASTATIN CALCIUM 40 MG/1
40 TABLET, FILM COATED ORAL DAILY
Qty: 90 TABLET | Refills: 3 | Status: SHIPPED | OUTPATIENT
Start: 2024-04-02 | End: 2025-04-02

## 2024-04-02 RX ORDER — AMLODIPINE BESYLATE 5 MG/1
5 TABLET ORAL EVERY 12 HOURS
Qty: 180 TABLET | Refills: 3 | Status: SHIPPED | OUTPATIENT
Start: 2024-04-02 | End: 2025-04-02

## 2024-04-03 ENCOUNTER — HOME CARE VISIT (OUTPATIENT)
Dept: HOME HEALTH SERVICES | Facility: HOME HEALTH | Age: 79
End: 2024-04-03
Payer: MEDICARE

## 2024-04-03 VITALS
HEART RATE: 70 BPM | TEMPERATURE: 97.7 F | DIASTOLIC BLOOD PRESSURE: 64 MMHG | RESPIRATION RATE: 16 BRPM | OXYGEN SATURATION: 99 % | SYSTOLIC BLOOD PRESSURE: 120 MMHG

## 2024-04-03 VITALS
OXYGEN SATURATION: 97 % | DIASTOLIC BLOOD PRESSURE: 90 MMHG | TEMPERATURE: 97 F | RESPIRATION RATE: 18 BRPM | HEART RATE: 68 BPM | SYSTOLIC BLOOD PRESSURE: 150 MMHG

## 2024-04-03 PROCEDURE — 1090000001 HH PPS REVENUE CREDIT

## 2024-04-03 PROCEDURE — G0157 HHC PT ASSISTANT EA 15: HCPCS | Mod: HHH

## 2024-04-03 PROCEDURE — 1090000002 HH PPS REVENUE DEBIT

## 2024-04-03 PROCEDURE — G0158 HHC OT ASSISTANT EA 15: HCPCS | Mod: HHH

## 2024-04-03 ASSESSMENT — ENCOUNTER SYMPTOMS
PAIN LOCATION - PAIN FREQUENCY: INTERMITTENT
PAIN LOCATION: RIGHT FOOT
DENIES PAIN: 1
PAIN LOCATION - PAIN SEVERITY: 2/10
PAIN LOCATION - PAIN SEVERITY: 2/10
PAIN LOCATION - PAIN QUALITY: ACHING, THROBBING
PAIN LOCATION - RELIEVING FACTORS: REST
PAIN LOCATION - PAIN FREQUENCY: INTERMITTENT
PAIN LOCATION - PAIN DURATION: SINCE YESTERDAY
PAIN LOCATION: LEFT FOOT
PERSON REPORTING PAIN: PATIENT
PAIN: 1
PAIN LOCATION - PAIN QUALITY: ACHING, THROBBING
PAIN LOCATION - RELIEVING FACTORS: REST
PERSON REPORTING PAIN: PATIENT
PAIN LOCATION - PAIN DURATION: SINCE YESTERDAY

## 2024-04-04 PROCEDURE — 1090000002 HH PPS REVENUE DEBIT

## 2024-04-04 PROCEDURE — 1090000001 HH PPS REVENUE CREDIT

## 2024-04-05 PROCEDURE — 1090000001 HH PPS REVENUE CREDIT

## 2024-04-05 PROCEDURE — 1090000002 HH PPS REVENUE DEBIT

## 2024-04-06 PROCEDURE — 1090000002 HH PPS REVENUE DEBIT

## 2024-04-06 PROCEDURE — 1090000001 HH PPS REVENUE CREDIT

## 2024-04-07 PROCEDURE — 1090000002 HH PPS REVENUE DEBIT

## 2024-04-07 PROCEDURE — 1090000001 HH PPS REVENUE CREDIT

## 2024-04-08 PROCEDURE — 1090000001 HH PPS REVENUE CREDIT

## 2024-04-08 PROCEDURE — 1090000002 HH PPS REVENUE DEBIT

## 2024-04-08 RX ORDER — HYDROXYCHLOROQUINE SULFATE 200 MG/1
TABLET, FILM COATED ORAL
Qty: 60 TABLET | Refills: 11 | Status: SHIPPED | OUTPATIENT
Start: 2024-04-08

## 2024-04-09 ENCOUNTER — HOME CARE VISIT (OUTPATIENT)
Dept: HOME HEALTH SERVICES | Facility: HOME HEALTH | Age: 79
End: 2024-04-09
Payer: MEDICARE

## 2024-04-09 VITALS
RESPIRATION RATE: 18 BRPM | HEART RATE: 78 BPM | DIASTOLIC BLOOD PRESSURE: 100 MMHG | SYSTOLIC BLOOD PRESSURE: 150 MMHG | OXYGEN SATURATION: 98 % | TEMPERATURE: 98 F

## 2024-04-09 VITALS
HEART RATE: 62 BPM | RESPIRATION RATE: 16 BRPM | DIASTOLIC BLOOD PRESSURE: 72 MMHG | OXYGEN SATURATION: 98 % | TEMPERATURE: 97.9 F | SYSTOLIC BLOOD PRESSURE: 130 MMHG

## 2024-04-09 PROCEDURE — 1090000002 HH PPS REVENUE DEBIT

## 2024-04-09 PROCEDURE — G0158 HHC OT ASSISTANT EA 15: HCPCS | Mod: HHH

## 2024-04-09 PROCEDURE — 1090000001 HH PPS REVENUE CREDIT

## 2024-04-09 PROCEDURE — G0157 HHC PT ASSISTANT EA 15: HCPCS | Mod: HHH

## 2024-04-09 ASSESSMENT — ENCOUNTER SYMPTOMS
PERSON REPORTING PAIN: PATIENT
DENIES PAIN: 1
DENIES PAIN: 1

## 2024-04-10 ENCOUNTER — APPOINTMENT (OUTPATIENT)
Dept: CARDIOLOGY | Facility: HOSPITAL | Age: 79
End: 2024-04-10
Payer: MEDICARE

## 2024-04-10 ENCOUNTER — HOME CARE VISIT (OUTPATIENT)
Dept: HOME HEALTH SERVICES | Facility: HOME HEALTH | Age: 79
End: 2024-04-10
Payer: MEDICARE

## 2024-04-10 PROCEDURE — 1090000002 HH PPS REVENUE DEBIT

## 2024-04-10 PROCEDURE — 1090000001 HH PPS REVENUE CREDIT

## 2024-04-11 PROCEDURE — 1090000002 HH PPS REVENUE DEBIT

## 2024-04-11 PROCEDURE — 1090000001 HH PPS REVENUE CREDIT

## 2024-04-12 ENCOUNTER — OFFICE VISIT (OUTPATIENT)
Dept: OBSTETRICS AND GYNECOLOGY | Facility: CLINIC | Age: 79
End: 2024-04-12
Payer: MEDICARE

## 2024-04-12 VITALS
HEIGHT: 63 IN | WEIGHT: 218 LBS | BODY MASS INDEX: 38.62 KG/M2 | SYSTOLIC BLOOD PRESSURE: 163 MMHG | DIASTOLIC BLOOD PRESSURE: 67 MMHG

## 2024-04-12 DIAGNOSIS — N39.41 URGE URINARY INCONTINENCE: ICD-10-CM

## 2024-04-12 LAB
POC APPEARANCE, URINE: ABNORMAL
POC BILIRUBIN, URINE: NEGATIVE
POC BLOOD, URINE: NEGATIVE
POC COLOR, URINE: YELLOW
POC GLUCOSE, URINE: NEGATIVE MG/DL
POC KETONES, URINE: NEGATIVE MG/DL
POC LEUKOCYTES, URINE: ABNORMAL
POC NITRITE,URINE: NEGATIVE
POC PH, URINE: 5.5 PH
POC PROTEIN, URINE: ABNORMAL MG/DL
POC SPECIFIC GRAVITY, URINE: >=1.03
POC UROBILINOGEN, URINE: 0.2 EU/DL

## 2024-04-12 PROCEDURE — 1090000001 HH PPS REVENUE CREDIT

## 2024-04-12 PROCEDURE — 1157F ADVNC CARE PLAN IN RCRD: CPT | Performed by: NURSE PRACTITIONER

## 2024-04-12 PROCEDURE — 1159F MED LIST DOCD IN RCRD: CPT | Performed by: NURSE PRACTITIONER

## 2024-04-12 PROCEDURE — 1090000002 HH PPS REVENUE DEBIT

## 2024-04-12 PROCEDURE — 3078F DIAST BP <80 MM HG: CPT | Performed by: NURSE PRACTITIONER

## 2024-04-12 PROCEDURE — 99212 OFFICE O/P EST SF 10 MIN: CPT | Performed by: NURSE PRACTITIONER

## 2024-04-12 PROCEDURE — 81003 URINALYSIS AUTO W/O SCOPE: CPT | Performed by: NURSE PRACTITIONER

## 2024-04-12 PROCEDURE — 3077F SYST BP >= 140 MM HG: CPT | Performed by: NURSE PRACTITIONER

## 2024-04-13 PROCEDURE — 1090000001 HH PPS REVENUE CREDIT

## 2024-04-13 PROCEDURE — 1090000002 HH PPS REVENUE DEBIT

## 2024-04-14 PROCEDURE — 1090000001 HH PPS REVENUE CREDIT

## 2024-04-14 PROCEDURE — 1090000002 HH PPS REVENUE DEBIT

## 2024-04-15 ENCOUNTER — HOME CARE VISIT (OUTPATIENT)
Dept: HOME HEALTH SERVICES | Facility: HOME HEALTH | Age: 79
End: 2024-04-15
Payer: MEDICARE

## 2024-04-15 VITALS
RESPIRATION RATE: 18 BRPM | DIASTOLIC BLOOD PRESSURE: 80 MMHG | SYSTOLIC BLOOD PRESSURE: 150 MMHG | OXYGEN SATURATION: 98 % | HEART RATE: 62 BPM | TEMPERATURE: 98 F

## 2024-04-15 VITALS
OXYGEN SATURATION: 99 % | SYSTOLIC BLOOD PRESSURE: 140 MMHG | TEMPERATURE: 97.7 F | DIASTOLIC BLOOD PRESSURE: 70 MMHG | HEART RATE: 62 BPM

## 2024-04-15 PROCEDURE — G0158 HHC OT ASSISTANT EA 15: HCPCS | Mod: CO,HHH

## 2024-04-15 PROCEDURE — G0151 HHCP-SERV OF PT,EA 15 MIN: HCPCS | Mod: HHH

## 2024-04-15 PROCEDURE — 1090000002 HH PPS REVENUE DEBIT

## 2024-04-15 PROCEDURE — 1090000001 HH PPS REVENUE CREDIT

## 2024-04-15 PROCEDURE — 0023 HH SOC

## 2024-04-15 SDOH — HEALTH STABILITY: PHYSICAL HEALTH: EXERCISE ACTIVITY: HIP FLEXION, KNEE FLEXION, HIP ABD, HIP EXT, SQUATS AND HEEL RAISES

## 2024-04-15 SDOH — HEALTH STABILITY: PHYSICAL HEALTH: PHYSICAL EXERCISE: STANDING

## 2024-04-15 SDOH — HEALTH STABILITY: PHYSICAL HEALTH: PHYSICAL EXERCISE: 15

## 2024-04-15 ASSESSMENT — ENCOUNTER SYMPTOMS
DENIES PAIN: 1
DENIES PAIN: 1
PERSON REPORTING PAIN: PATIENT
PERSON REPORTING PAIN: PATIENT

## 2024-04-15 ASSESSMENT — ACTIVITIES OF DAILY LIVING (ADL): AMBULATION ASSISTANCE ON FLAT SURFACES: 1

## 2024-04-15 NOTE — CASE COMMUNICATION
DISCHARGE SUMMARY:    DISCIPLINE: PT  DATE OF DISCIPLINE DISCHARGE: 4.15.24  REASON FOR DISCHARGE:has reached max potential  COORDINATION NOTE: Patient is ambulating safely with rollator in the home. Established hep and encouraged patient to continue with.   EVALUATION OF GOALS:Patient continues to demonstrate impaired gait with wider renetta and short step length. Recommend use of rollator when up.   SUMMARY OF CARE PROVIDED: instruction i n therexs, gait training, balance training and safety in the home  DISCHARGE INSTRUCTIONS GIVEN: Encouraged to continue to use the rollator and to continue with hep  SERVICES REMAINING: OT  NOMNC OBTAINED: yes

## 2024-04-16 PROCEDURE — 1090000001 HH PPS REVENUE CREDIT

## 2024-04-16 PROCEDURE — 1090000002 HH PPS REVENUE DEBIT

## 2024-04-17 PROCEDURE — 1090000001 HH PPS REVENUE CREDIT

## 2024-04-17 PROCEDURE — 1090000002 HH PPS REVENUE DEBIT

## 2024-04-18 ENCOUNTER — HOME CARE VISIT (OUTPATIENT)
Dept: HOME HEALTH SERVICES | Facility: HOME HEALTH | Age: 79
End: 2024-04-18
Payer: MEDICARE

## 2024-04-18 PROCEDURE — 1090000001 HH PPS REVENUE CREDIT

## 2024-04-18 PROCEDURE — 1090000002 HH PPS REVENUE DEBIT

## 2024-04-18 PROCEDURE — G0152 HHCP-SERV OF OT,EA 15 MIN: HCPCS | Mod: HHH

## 2024-04-18 ASSESSMENT — ENCOUNTER SYMPTOMS
DENIES PAIN: 1
PERSON REPORTING PAIN: PATIENT

## 2024-04-18 ASSESSMENT — ACTIVITIES OF DAILY LIVING (ADL)
OASIS_M1830: 01
HOME_HEALTH_OASIS: 00

## 2024-04-22 ENCOUNTER — OFFICE VISIT (OUTPATIENT)
Dept: CARDIOLOGY | Facility: HOSPITAL | Age: 79
End: 2024-04-22
Payer: MEDICARE

## 2024-04-22 ENCOUNTER — ANCILLARY PROCEDURE (OUTPATIENT)
Dept: CARDIOLOGY | Facility: HOSPITAL | Age: 79
End: 2024-04-22
Payer: MEDICARE

## 2024-04-22 VITALS
HEART RATE: 66 BPM | BODY MASS INDEX: 37.69 KG/M2 | WEIGHT: 212.74 LBS | OXYGEN SATURATION: 99 % | SYSTOLIC BLOOD PRESSURE: 160 MMHG | DIASTOLIC BLOOD PRESSURE: 97 MMHG

## 2024-04-22 DIAGNOSIS — R06.02 SHORTNESS OF BREATH: ICD-10-CM

## 2024-04-22 DIAGNOSIS — R55 SYNCOPE AND COLLAPSE: Primary | ICD-10-CM

## 2024-04-22 DIAGNOSIS — R55 SYNCOPE AND COLLAPSE: ICD-10-CM

## 2024-04-22 PROCEDURE — 3077F SYST BP >= 140 MM HG: CPT | Performed by: NURSE PRACTITIONER

## 2024-04-22 PROCEDURE — 1036F TOBACCO NON-USER: CPT | Performed by: NURSE PRACTITIONER

## 2024-04-22 PROCEDURE — 99213 OFFICE O/P EST LOW 20 MIN: CPT | Performed by: NURSE PRACTITIONER

## 2024-04-22 PROCEDURE — 1159F MED LIST DOCD IN RCRD: CPT | Performed by: NURSE PRACTITIONER

## 2024-04-22 PROCEDURE — 1157F ADVNC CARE PLAN IN RCRD: CPT | Performed by: NURSE PRACTITIONER

## 2024-04-22 PROCEDURE — 3080F DIAST BP >= 90 MM HG: CPT | Performed by: NURSE PRACTITIONER

## 2024-04-22 PROCEDURE — 93246 EXT ECG>7D<15D RECORDING: CPT

## 2024-04-22 ASSESSMENT — ENCOUNTER SYMPTOMS
GASTROINTESTINAL NEGATIVE: 1
RESPIRATORY NEGATIVE: 1
LIGHT-HEADEDNESS: 1
DEPRESSION: 0
MYALGIAS: 1
ALLERGIC/IMMUNOLOGIC NEGATIVE: 1
ENDOCRINE NEGATIVE: 1
CONSTITUTIONAL NEGATIVE: 1
BLURRED VISION: 1
CARDIOVASCULAR NEGATIVE: 1
PSYCHIATRIC NEGATIVE: 1
DIZZINESS: 1

## 2024-04-22 NOTE — PATIENT INSTRUCTIONS
CALL WITH ANY QUESTIONS   NO MEDICATION CHANGES   ECHOCARDIOGRAM   HEART MONITOR FOR TWO WEEKS   WILL CALL TO REVIEW THE RESULTS

## 2024-04-22 NOTE — PROGRESS NOTES
Referred by Dr. Neil ref. provider found for Follow-up (Routine.), Syncope, and Dizziness     History Of Present Illness:    Mariajose Haq is a very pleasant 78 year old female with a history of HTN and syncope she is here for a follow up visit. The patient is seen in collaboration with Dr. Tran. Mrs. Haq has a had a few episodes of syncope since her last office visit. She has fallen multiple times. States she has had episodes of dizziness. Denies chest pain or shortness of breath. Has a hospital bed states she has been sleeping better. Finished physical therapy, she goes on stationary bike.     Review of Systems   Constitutional: Negative.   HENT: Negative.     Eyes:  Positive for blurred vision.   Cardiovascular: Negative.    Respiratory: Negative.     Endocrine: Negative.    Skin: Negative.    Musculoskeletal:  Positive for muscle weakness and myalgias.   Gastrointestinal: Negative.    Neurological:  Positive for dizziness and light-headedness.   Psychiatric/Behavioral: Negative.     Allergic/Immunologic: Negative.         Past Medical History:  She has a past medical history of Ataxia, unspecified, Other conditions influencing health status, Other conditions influencing health status, Other specified anxiety disorders, Other specified anxiety disorders, Pain in unspecified joint, Personal history of other diseases of the circulatory system (09/27/2018), Personal history of other diseases of the nervous system and sense organs, Personal history of other specified conditions, Umbilical hernia without obstruction or gangrene, Unspecified cataract, Unspecified convulsions (Multi), and Unspecified convulsions (Multi).    Past Surgical History:  She has a past surgical history that includes Other surgical history (03/07/2013); Colonoscopy (03/11/2013); Other surgical history (06/08/2016); Other surgical history (07/21/2020); Cataract extraction (07/17/2013); Other surgical history (05/12/2020); Other  surgical history (05/12/2020); CT angio head w and wo IV contrast (1/6/2016); CT angio head w and wo IV contrast (3/1/2016); CT angio head w and wo IV contrast (5/22/2020); and CT angio neck (5/22/2020).      Social History:  She reports that she has never smoked. She has never used smokeless tobacco. She reports that she does not currently use alcohol. She reports that she does not use drugs.    Family History:  Family History   Problem Relation Name Age of Onset    Alzheimer's disease Mother      Colon cancer Father      Diabetes Father      Hypertension Father      Subarachnoid hemorrhage Father          nontraumatic    Hypertension Sister      Other (Cardiac problems) Sister      Cancer Brother      Gout Brother      Hypertension Brother      Pancreatitis Brother      Autism Son      Coronary artery disease Paternal Grandfather      Autism Other          Childhood onset pervasive developmental autistic disorder    Hypertension Other          Allergies:  Clobazam, Lamotrigine, Levetiracetam, Topiramate, and Penicillins    Outpatient Medications:  Current Outpatient Medications   Medication Instructions    amLODIPine (NORVASC) 5 mg, oral, Every 12 hours    antiox.mv no.10/omeg3s/lut/day (I-CAPS ORAL) 1 capsule, oral, Daily, As directed    atorvastatin (LIPITOR) 40 mg, oral, Daily    cholecalciferol (Vitamin D-3) 50 mcg (2,000 unit) capsule 1 capsule, oral, Daily    cyanocobalamin (Vitamin B-12) 100 mcg tablet 1 tablet, oral, Daily    folic acid (FOLVITE) 1 mg, oral, Daily    furosemide (Lasix) 40 mg tablet TAKE 1 TABLET BY MOUTH EVERY DAY AS NEEDED    guaiFENesin (Mucinex) 600 mg 12 hr tablet 1 tablet, oral, Daily PRN    hydroxychloroquine (Plaquenil) 200 mg tablet oral, 2 times daily with meals    ibuprofen-diphenhydramine cit 200-38 mg tablet per tablet 2 tablets, oral, Nightly    lidocaine 4 % patch 1 patch, transdermal, Daily, Remove & discard patch within 12 hours or as directed by MD.    magnesium oxide  (MAG-OX) 250 mg, oral, Once or twice daily to relax muscles    methotrexate (Trexall) 2.5 mg tablet 5 tablets, oral, Once Weekly    omeprazole OTC (PRILOSEC OTC) 40 mg, oral, Daily before breakfast    OXcarbazepine (TRILEPTAL) 1,050 mg, oral, 2 times daily    potassium chloride (Klor-Con) 20 mEq packet 20 mEq, oral, Daily    sertraline (Zoloft) 100 mg tablet TAKE 1 TABLET ONCE DAILY.        Last Recorded Vitals:  Vitals:    04/22/24 1007   BP: (!) 160/97   Pulse: 66   SpO2: 99%   Weight: 96.5 kg (212 lb 11.9 oz)       Physical Exam:  Physical Exam  Vitals reviewed.   HENT:      Head: Normocephalic.      Nose: Nose normal.   Eyes:      Pupils: Pupils are equal, round, and reactive to light.   Cardiovascular:      Rate and Rhythm: Normal rate and regular rhythm.   Pulmonary:      Effort: Pulmonary effort is normal.      Breath sounds: Normal breath sounds.   Abdominal:      General: Abdomen is flat.      Palpations: Abdomen is soft.   Musculoskeletal:         General: Normal range of motion.      Cervical back: Normal range of motion.   Skin:     General: Skin is warm and dry.   Neurological:      General: No focal deficit present.      Mental Status: He is alert and oriented to person, place, and time.   Psychiatric:         Mood and Affect: Mood normal.            Last Labs:  CBC -  Lab Results   Component Value Date    WBC 4.8 03/08/2024    HGB 10.4 (L) 03/08/2024    HCT 31.0 (L) 03/08/2024    MCV 96 03/08/2024     (L) 03/08/2024       CMP -  Lab Results   Component Value Date    CALCIUM 8.1 (L) 03/08/2024    PHOS 2.9 10/27/2021    PROT 6.0 (L) 03/07/2024    ALBUMIN 3.6 03/07/2024    AST 33 03/07/2024    ALT 19 03/07/2024    ALKPHOS 146 (H) 03/07/2024    BILITOT 0.4 03/07/2024       LIPID PANEL -   Lab Results   Component Value Date    CHOL 240 (H) 11/11/2022    TRIG 75 11/11/2022    HDL 88.8 11/11/2022    CHHDL 2.7 11/11/2022    LDLF 136 (H) 11/11/2022    VLDL 15 11/11/2022       RENAL FUNCTION PANEL -    Lab Results   Component Value Date    GLUCOSE 84 03/08/2024     (L) 03/08/2024    K 4.4 03/08/2024     03/08/2024    CO2 22 03/08/2024    ANIONGAP 17 03/08/2024    BUN 16 03/08/2024    CREATININE 0.81 03/08/2024    CALCIUM 8.1 (L) 03/08/2024    PHOS 2.9 10/27/2021    ALBUMIN 3.6 03/07/2024        Lab Results   Component Value Date    BNP 69 03/07/2024       Last Cardiology Tests:  ECG:    Echo:  Echocardiogram 4/8/2022  1. The left ventricular systolic function is normal with a 55-60% estimated ejection fraction.  2. Spectral Doppler shows an impaired relaxation pattern of left ventricular diastolic filling.  3. Mild aortic valve stenosis.  4. There is mild aortic valve regurgitation.  5. There is mild mitral and tricuspid regurgitation.  6. The estimated pulmonary artery pressure is mildly elevated with the RVSP at 40.5 mmHg.      Echocardiogram 3/22/2021  1. The left ventricular systolic function is normal with a 60-65% estimated  ejection fraction.  2. Spectral Doppler shows an impaired relaxation pattern of left ventricular  diastolic filling.  3. The left atrium is moderately dilated.  4. Mild aortic valve stenosis.  5. There is mild aortic valve regurgitation.  6. There is mild mitral and tricuspid regurgitation.  7. The estimated pulmonary artery pressure is mildly elevated with the RVSP at  46.2 mmHg.    echocardiogram 5/25/2018   1. The left ventricular systolic function is normal with a 60% estimated  ejection fraction.   2. Spectral Doppler shows an impaired relaxation pattern of left ventricular  diastolic filling.   3. The left atrium is moderately dilated.   4. Mild aortic valve stenosis.   5. The estimated pulmonary artery pressure is mildly elevated with the RVSP at  36.5 mmHg.  Echo August 2013:  The left ventricular chamber size is normal.  There is left ventricular hypertrophy noted.  Global left ventricular wall motion and contractility are within normal  limits.  There is an E to A  reversal in the mitral valve flow pattern suggestive  of diastolic dysfunction.  There is mild mitral regurgitation.   There is mild tricuspid regurgitation.  The right ventricular systolic pressure is calculated at 39 mmHg.   There is a small pericardial effusion.     Ejection Fractions  LVEF 55-60%  Cath:    Stress Test:    Cardiac Imaging:  Tyshawno patch Decmber 2015: Sinus rhythm without significant abnormalities or arrhythmias     carotid ultrasound 5/25/2018   Right Carotid: Findings are consistent with less than 50% stenosis of the right  ICA. Laminar flow seen by color Doppler. Right external carotid artery appears  patent with no evidence of stenosis. The right vertebral artery is patent with  antegrade flow. No evidence of hemodynamically significant stenosis in the right  subclavian.  Left Carotid: Findings are consistent with less than 50% stenosis of the left  ICA. Left external carotid artery appears patent with no evidence of stenosis.  The left vertebral artery is patent with antegrade flow.  No evidence of hemodynamically significant stenosis in the left subclavian.    Assessment/Plan   Very pleasant 78-year-old female who has a history of syncopal episodes and hypertension, recent admission after syncope and dizziness. She will have a monitor to evaluate for an arrhythmia. Echocardiogram to evaluate her aortic stenosis and LV function. Blood pressure is elevated today, blood pressure at home has been well controlled.       Plan   -call with any questions   -echocardiogram   -monitor blood pressure at home   -currently taking the Lasix as needed   -continue Atorvastatin   -Continue Amlodipine 5 mg twice a day   -heart monitor for two weeks   -will call to review the results     Francie Duff, ALENA-CNP

## 2024-04-23 ENCOUNTER — OFFICE VISIT (OUTPATIENT)
Dept: OBSTETRICS AND GYNECOLOGY | Facility: CLINIC | Age: 79
End: 2024-04-23
Payer: MEDICARE

## 2024-04-23 VITALS
WEIGHT: 214 LBS | SYSTOLIC BLOOD PRESSURE: 136 MMHG | HEIGHT: 63 IN | BODY MASS INDEX: 37.92 KG/M2 | DIASTOLIC BLOOD PRESSURE: 82 MMHG

## 2024-04-23 DIAGNOSIS — N39.41 URGE INCONTINENCE OF URINE: Primary | ICD-10-CM

## 2024-04-23 DIAGNOSIS — E66.9 CLASS 2 OBESITY WITH BODY MASS INDEX (BMI) OF 38.0 TO 38.9 IN ADULT, UNSPECIFIED OBESITY TYPE, UNSPECIFIED WHETHER SERIOUS COMORBIDITY PRESENT: ICD-10-CM

## 2024-04-23 PROCEDURE — 99215 OFFICE O/P EST HI 40 MIN: CPT | Performed by: OBSTETRICS & GYNECOLOGY

## 2024-04-23 PROCEDURE — 3075F SYST BP GE 130 - 139MM HG: CPT | Performed by: OBSTETRICS & GYNECOLOGY

## 2024-04-23 PROCEDURE — 1159F MED LIST DOCD IN RCRD: CPT | Performed by: OBSTETRICS & GYNECOLOGY

## 2024-04-23 PROCEDURE — 1157F ADVNC CARE PLAN IN RCRD: CPT | Performed by: OBSTETRICS & GYNECOLOGY

## 2024-04-23 PROCEDURE — 3079F DIAST BP 80-89 MM HG: CPT | Performed by: OBSTETRICS & GYNECOLOGY

## 2024-04-23 ASSESSMENT — ENCOUNTER SYMPTOMS
SEIZURES: 1
ENDOCRINE NEGATIVE: 1
EYES NEGATIVE: 1
GASTROINTESTINAL NEGATIVE: 1
RESPIRATORY NEGATIVE: 1
CONSTITUTIONAL NEGATIVE: 1
FREQUENCY: 1
CARDIOVASCULAR NEGATIVE: 1
MUSCULOSKELETAL NEGATIVE: 1
PSYCHIATRIC NEGATIVE: 1

## 2024-04-24 ASSESSMENT — ENCOUNTER SYMPTOMS
NEUROLOGICAL NEGATIVE: 1
EYES NEGATIVE: 1
PSYCHIATRIC NEGATIVE: 1
ENDOCRINE NEGATIVE: 1
GASTROINTESTINAL NEGATIVE: 1
CARDIOVASCULAR NEGATIVE: 1
FREQUENCY: 1
CONSTITUTIONAL NEGATIVE: 1
DIFFICULTY URINATING: 1
RESPIRATORY NEGATIVE: 1
MUSCULOSKELETAL NEGATIVE: 1

## 2024-04-24 NOTE — PROGRESS NOTES
Subjective   Patient ID: Mariajose Haq is a 78 y.o. female who presents for Urinary Incontinence (FU for for urinary incontinence doesn't really have any complaints doesn't know why she's here for her appointment.  /Carol CASTLE /).    HPI  She was previously seen on 3/22/2024 and was referred to Dr. Hinojosa for a Botox work up for UUI.     Urinary Symptoms:  - Previously discontinued Trospium 20mg BID as this medication negatively interacted with her seizure medication and she was referred to Dr. Hinojosa to pursue a Botox work up which is now scheduled for 4/23/2024.   - She reports persistent OAB symptoms such as urge to void but no urine volume is being excreted when she tries to urinate (I.e. strangury).   - She not able to start Myrbetriq as this medication was cost-prohibitive.        Review of Systems   Constitutional: Negative.    HENT: Negative.     Eyes: Negative.    Respiratory: Negative.     Cardiovascular: Negative.    Gastrointestinal: Negative.    Endocrine: Negative.    Genitourinary:  Positive for difficulty urinating, enuresis, frequency and urgency.   Musculoskeletal: Negative.    Neurological: Negative.    Psychiatric/Behavioral: Negative.         Objective   Physical Exam  Constitutional:       Appearance: Normal appearance.   HENT:      Head: Normocephalic and atraumatic.   Pulmonary:      Effort: Pulmonary effort is normal.   Psychiatric:         Mood and Affect: Mood normal.         Behavior: Behavior normal.         Thought Content: Thought content normal.         Judgment: Judgment normal.          Assessment/Plan   78 year old female with UUI/OAB. Comorbidities include: HTN, diastolic heart failure, GERD, ROMULO, epilepsy, and rheumatoid arthritis.      Diagnoses:  #1 Urge incontinence      Plan:  1. UUI, OAB  - POCT UA with moderate WBC but negative for bacteria/nitrites.   - Previously was unable to start Myrbetriq as this was cost-prohibitive and discontinued Trospium 20mg BID as  this medication negatively interacted with her seizure medication.   - She was previously referred to Dr. Hinojosa to pursue a Botox work up which is now scheduled for 4/23/2024.     Follow up in 2 weeks with Dr. Danielle Hinojosa for Botox work up to help manage OAB/UUI.     Scribe Attestation  By signing my name below, I, Judith Hansen, attest that this documentation has been prepared under the direction and in the presence of JONES Leo on 04/12/2024 at 7:08 PM.

## 2024-04-24 NOTE — PROGRESS NOTES
[facility] Department of Urogynecology   Danielle Hinojosa MD, MPH   [phone]    ASSESSMENT AND PLAN:   78 year old female with UUI/OAB. Comorbidities include: HTN, diastolic heart failure, GERD, ROMULO, epilepsy, and rheumatoid arthritis.     Diagnoses:  #1 Urge incontinence     Plan:  1. UUI, OAB  - Patient previously failed Myrbetriq and tried Trospium but that her neurologist advised her to discontinue Trospium due to this negatively interacting with her epilepsy medication.   - We discussed alternative third-line OAB treatment options to consider such as intradetrusor Botox injections, PTNS, or SNM.   > We discussed the risks of intradetrusor Botox and cystoscopy including the scope scraping the urethra which might cause burning/irritation, hematuria, and UTI. We will give her a one-time dose of Macrobid for UTI ppx and Pyridium to help prevent irritative symptoms from the cystoscope. This procedure is performed in office where we use cystoscopy to visualize the bladder neck/muscles and inject the Botox into a few areas around the bladder to help reduce bladder spasms in hopes of reducing urinary urgency, frequency, nocturia, and incontinent episodes. There is around a 5% risk of urinary retention with symptoms of retention including strangury and urinary frequency with small volume urine voids however we will have her RTC in one week for a nurse visit to check her PVR. If she experiences urinary retention we discussed that she can either begin taking po Flomax to help relax the urethra to allow urine to pass through vs. learn how to ISC to ensure she is emptying her bladder until the effects of Botox begin to wear off with time (I.e. between 2-8 weeks). Botox typically provides patients with 6-9 months of OAB symptom improvement and patients typically have intradetrusor Botox injections 1-2x per year to help manage their OAB.   > Discussed consideration of pursuing sacral neuromodulation (SNM) for  management of her OAB. Reviewed the procedure for sacral neuromodulation and discussed it is a staged procedure which allows us to determine degree of symptom improvement during the PNE trial prior to implanting permanent device.  We counseled her on the risks and steps of the PNE in office placement where we would inject Lidocaine for localized anesthesia and based on the landmarks of her back we will place the lead stimulator into the S3 foramen to stimulate the pudendal nerve in hopes of improving OAB. We would give her an antibiotic after the procedure to prevent infection. The risks include discomfort, bleeding, infection, and since we placing the lead based off landmarks there is a chance of not placing the lead into the correct location. If we do not get a good response from the PNE trial we could consider placing the lead in the OR with fluoroscopic X-ray to ensure that the lead is placed into the S3 foramen. During the PNE trial period (i.e. around x5 days) she will keep a bladder diary to keep track of her symptoms to determine if she has had 50% or greater in her symptom improvement. Reviewed that if symptoms are at least 50% improved during the trial period we would implant the permanent device in the OR under sedation.  > The patient is interested in pursuing SNM > intradetrusor Botox injections.   - Gave her PI handout on SNM and intradetrusor Botox injections to review and consider.   - Plan to obtain UDS @ [facility] prior to pursuing PNE trial as she is endorsing symptoms of retention with feeling the urge to urine with no urine volume output being voided.   - Ordered urodynamics to better characterize her voiding/bladder filling patterns and further characterize her urinary issues.    Follow up in 1-2 weeks with Dr. Danielle Hinojosa to review UDS results and discuss if she wishes to pursue SNM or Botox.     Scribe Attestation  By signing my name below, IStanton, Judith, attest that this  documentation has been prepared under the direction and in the presence of Danielle Hinojosa MD MPH on 04/23/2024 at 8:25 PM.     Problem List Items Addressed This Visit          Genitourinary and Reproductive    Urinary incontinence - Primary    Relevant Orders    Uroflowmetry      I spent a total of 40 minutes in face to face and non face to face time.      Danielle Hinojosa MD, MPH, FACOG     I Dr. Hinojosa, personally performed the services described in the documentation as scribed in my presence and confirm it is both complete and accurate.  Danielle Hinojosa MD, MPH, FACOG      Established    HISTORY OF PRESENT ILLNESS:   78 year old female referred by JONES Barrientos for OAB.     Record Review:   - 3/25/2024 JONES Barrientos note RE: OAB/UUI - Discussed treatment options for OAB. She has tried medications in the past that she could consider restarting or may consider third line therapies such as intradetrusor Botox or SNM. She should avoid anticholinergics. Discussed Botox and referred to Dr. Hinojosa.      Urinary Symptoms:   - Patient previously failed Myrbetriq and tried Trospium but that her neurologist advised her to discontinue Trospium due to this negatively interacting with her epilepsy medication. The patient is now interested in pursuing intradetrusor Botox injections.   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 She currently has a Zio pouch in place to help evaluate her cardiac functioning given her recent syncopal episodes.   - Endorses incontinence at night as she notes having saturated Depends in the morning when she wakes up. She wears Depends/pads occasionally during the day for insurance purposes but largely denies UUI during the day but does have urgency.   - Nocturia 0-3x per night.   - Voids several times throughout the day and endorses urinary frequency.   -  No GILBERT with coughing, laughing, or sneezing.   - Reports difficulty emptying her bladder described as having the urge to void but no urine being excreted when she goes to void.        Past Medical History:     Past Medical History:   Diagnosis Date    Ataxia, unspecified     Ataxia    Other conditions influencing health status     Cognitive Functions Current Level Impaired Mild    Other conditions influencing health status     Screening for malignant neoplasms, colon    Other specified anxiety disorders     Depression with anxiety    Other specified anxiety disorders     Depression with anxiety    Pain in unspecified joint     Multiple joint pain    Personal history of other diseases of the circulatory system 09/27/2018    History of hypertension    Personal history of other diseases of the nervous system and sense organs     History of sleep apnea    Personal history of other specified conditions     History of fatigue    Umbilical hernia without obstruction or gangrene     Umbilical hernia    Unspecified cataract     Cataract of both eyes    Unspecified convulsions (Multi)     Seizure    Unspecified convulsions (Multi)     Convulsive disorder          Past Surgical History:     Past Surgical History:   Procedure Laterality Date    CATARACT EXTRACTION  07/17/2013    Cataract Surgery    COLONOSCOPY  03/11/2013    Complete Colonoscopy    CT ANGIO NECK  5/22/2020    CT NECK ANGIO W AND WO IV CONTRAST 5/22/2020 [facility]    CT HEAD ANGIO W AND WO IV CONTRAST  1/6/2016    CT HEAD ANGIO W AND WO IV CONTRAST 1/6/2016 [facility]    CT HEAD ANGIO W AND WO IV CONTRAST  3/1/2016    CT HEAD ANGIO W AND WO IV CONTRAST 3/1/2016 [facility] [place] [facility]    CT HEAD ANGIO W AND WO IV CONTRAST  5/22/2020    CT HEAD ANGIO W AND WO IV CONTRAST 5/22/2020 [facility]    OTHER SURGICAL HISTORY  03/07/2013    Excision Of Lesion Scalp    OTHER SURGICAL HISTORY  06/08/2016    Intracranial Aneurysm Repair    OTHER  SURGICAL HISTORY  07/21/2020    Colonoscopy complete for polypectomy    OTHER SURGICAL HISTORY  05/12/2020    Cyst excision    OTHER SURGICAL HISTORY  05/12/2020    [place] tooth extraction         Medications:     Prior to Admission medications    Medication Sig Start Date End Date Taking? Authorizing Provider   amLODIPine (Norvasc) 5 mg tablet Take 1 tablet (5 mg) by mouth every 12 hours. 4/2/24 4/2/25  ALENA Marie-CNP   antiox.mv no.10/omeg3s/lut/day (I-CAPS ORAL) Take 1 capsule by mouth once daily. As directed    Historical Provider, MD   atorvastatin (Lipitor) 40 mg tablet Take 1 tablet (40 mg) by mouth once daily. 4/2/24 4/2/25  JONES Marie   cholecalciferol (Vitamin D-3) 50 mcg (2,000 unit) capsule Take 1 capsule (50 mcg) by mouth once daily.    Historical Provider, MD   cyanocobalamin (Vitamin B-12) 100 mcg tablet Take 1 tablet (100 mcg) by mouth once daily.    Historical Provider, MD   folic acid (Folvite) 1 mg tablet Take 1 tablet (1 mg) by mouth once daily. 2/9/24 2/8/25  Aura Mckenzie MD   furosemide (Lasix) 40 mg tablet TAKE 1 TABLET BY MOUTH EVERY DAY AS NEEDED 10/23/23   JONES Marie   guaiFENesin (Mucinex) 600 mg 12 hr tablet Take 1 tablet (600 mg) by mouth once daily as needed for congestion.    Historical Provider, MD   hydroxychloroquine (Plaquenil) 200 mg tablet TAKE 1 TABLET BY MOUTH TWICE DAILY WITH FOOD 4/8/24   Aura Mckenzie MD   ibuprofen-diphenhydramine cit 200-38 mg tablet per tablet Take 2 tablets by mouth once daily at bedtime. 12/28/21   Historical Provider, MD   lidocaine 4 % patch Place 1 patch over 12 hours on the skin once daily. Remove & discard patch within 12 hours or as directed by MD. Do not start before March 9, 2024. 3/9/24   Elena Hernandez PA-C   magnesium oxide (Mag-Ox) 250 mg magnesium tablet Take 1 tablet (250 mg) by mouth. Once or twice daily to relax muscles    Historical Provider, MD   methotrexate (Trexall) 2.5  "mg tablet Take 5 tablets (12.5 mg total) by mouth 1 (one) time per week.    Historical Provider, MD   omeprazole OTC (PriLOSEC OTC) 20 mg EC tablet Take 2 tablets (40 mg) by mouth once daily in the morning. Take before meals. 7/10/23   Historical Provider, MD   OXcarbazepine (Trileptal) 300 mg tablet Take 3.5 tablets (1,050 mg) by mouth 2 times a day. 2/6/24   Kashif Pelaez MD   potassium chloride (Klor-Con) 20 mEq packet Take 20 mEq by mouth once daily.    Historical Provider, MD   sertraline (Zoloft) 100 mg tablet TAKE 1 TABLET ONCE DAILY.  Patient taking differently: Take 1 tablet (100 mg) by mouth once daily at bedtime. 7/16/23   MD ALONZO Mays  Review of Systems   Constitutional: Negative.    HENT: Negative.     Eyes: Negative.    Respiratory: Negative.     Cardiovascular: Negative.    Gastrointestinal: Negative.    Endocrine: Negative.    Genitourinary:  Positive for enuresis, frequency and urgency.   Musculoskeletal: Negative.    Neurological:  Positive for seizures.   Psychiatric/Behavioral: Negative.            PHYSICAL EXAM:    /82   Ht 1.6 m (5' 3\")   Wt 97.1 kg (214 lb)   BMI 37.91 kg/m²   No LMP recorded. Patient is postmenopausal.    Declines chaperone for physical exam.      Well developed, well nourished, in no apparent distress.   Neurologic/Psychiatric:  Awake, Alert and Oriented times 3.  Affect normal.         Data and DIAGNOSTIC STUDIES REVIEWED   Lab Results   Component Value Date    URINECULTURE No growth 03/07/2024      Lab Results   Component Value Date    GLUCOSE 84 03/08/2024    CALCIUM 8.1 (L) 03/08/2024     (L) 03/08/2024    K 4.4 03/08/2024    CO2 22 03/08/2024     03/08/2024    BUN 16 03/08/2024    CREATININE 0.81 03/08/2024     Lab Results   Component Value Date    WBC 4.8 03/08/2024    HGB 10.4 (L) 03/08/2024    HCT 31.0 (L) 03/08/2024    MCV 96 03/08/2024     (L) 03/08/2024      "

## 2024-05-02 DIAGNOSIS — M06.9 RHEUMATOID ARTHRITIS, INVOLVING UNSPECIFIED SITE, UNSPECIFIED WHETHER RHEUMATOID FACTOR PRESENT (MULTI): Primary | ICD-10-CM

## 2024-05-02 RX ORDER — METHOTREXATE 2.5 MG/1
12.5 TABLET ORAL
Qty: 20 TABLET | Refills: 11 | Status: SHIPPED | OUTPATIENT
Start: 2024-05-05 | End: 2025-05-05

## 2024-05-07 ENCOUNTER — PROCEDURE VISIT (OUTPATIENT)
Dept: OBSTETRICS AND GYNECOLOGY | Facility: CLINIC | Age: 79
End: 2024-05-07
Payer: MEDICARE

## 2024-05-07 DIAGNOSIS — N39.41 URGE INCONTINENCE: Primary | ICD-10-CM

## 2024-05-07 DIAGNOSIS — R35.0 URINARY FREQUENCY: ICD-10-CM

## 2024-05-07 DIAGNOSIS — N39.490 OVERFLOW INCONTINENCE OF URINE: ICD-10-CM

## 2024-05-07 PROCEDURE — 51797 INTRAABDOMINAL PRESSURE TEST: CPT | Performed by: OBSTETRICS & GYNECOLOGY

## 2024-05-07 PROCEDURE — 51729 CYSTOMETROGRAM W/VP&UP: CPT | Performed by: OBSTETRICS & GYNECOLOGY

## 2024-05-07 PROCEDURE — 51784 ANAL/URINARY MUSCLE STUDY: CPT | Performed by: OBSTETRICS & GYNECOLOGY

## 2024-05-10 ENCOUNTER — TELEPHONE (OUTPATIENT)
Dept: OBSTETRICS AND GYNECOLOGY | Facility: CLINIC | Age: 79
End: 2024-05-10

## 2024-05-13 NOTE — PROGRESS NOTES
Patient ID: Mariajose Haq is a 78 y.o. female.    Uroflowmetry    Date/Time: 5/7/2024 11:51 AM    Performed by: Ade Delgado MA  Authorized by: Ade Delgado MA    Procedure discussed: discussed risks, benefits and alternatives    Chaperone present: no    Timeout: timeout called immediately prior to procedure    Prep: patient was prepped and draped in usual sterile fashion      Procedure Details     Procedure: CMG with UPP/voiding pressures, EMG and intra-abdominal voiding study      CMG with UPP/Voiding Pressures Details:     First urge to void (mL): 241    Urgency to void (mL): 328    Bladder capacity (mL): 421    Intra-abdominal Voiding Study Details:     Rectal catheter placed to record intra-abdominal pressure: yes      Additional Details      Patient was not able to void for a uroflow. She was cathed for 200cc prior to test.   Patient was not able to void at capacity. Removed catheter and placed her on the commode at which time she voided 50cc and she was cathed for 400cc post test. She c/o of being extremely constipated           Urodynamics Evaluation Documentation      HPI/Indication for UDS: 78 year old female with UUI/OAB. Comorbidities include: HTN, diastolic heart failure, GERD, ROMULO, epilepsy, and rheumatoid arthritis.      Diagnoses:  #1 Urge incontinence      Plan:  1. UUI, OAB  - Patient previously failed Myrbetriq and tried Trospium but that her neurologist advised her to discontinue Trospium due to this negatively interacting with her epilepsy medication.   - We discussed alternative third-line OAB treatment options to consider such as intradetrusor Botox injections, PTNS, or SNM.   > We discussed the risks of intradetrusor Botox and cystoscopy including the scope scraping the urethra which might cause burning/irritation, hematuria, and UTI. We will give her a one-time dose of Macrobid for UTI ppx and Pyridium to help prevent irritative symptoms from the cystoscope. This procedure is performed  in office where we use cystoscopy to visualize the bladder neck/muscles and inject the Botox into a few areas around the bladder to help reduce bladder spasms in hopes of reducing urinary urgency, frequency, nocturia, and incontinent episodes. There is around a 5% risk of urinary retention with symptoms of retention including strangury and urinary frequency with small volume urine voids however we will have her RTC in one week for a nurse visit to check her PVR. If she experiences urinary retention we discussed that she can either begin taking po Flomax to help relax the urethra to allow urine to pass through vs. learn how to ISC to ensure she is emptying her bladder until the effects of Botox begin to wear off with time (I.e. between 2-8 weeks). Botox typically provides patients with 6-9 months of OAB symptom improvement and patients typically have intradetrusor Botox injections 1-2x per year to help manage their OAB.   > Discussed consideration of pursuing sacral neuromodulation (SNM) for management of her OAB. Reviewed the procedure for sacral neuromodulation and discussed it is a staged procedure which allows us to determine degree of symptom improvement during the PNE trial prior to implanting permanent device.  We counseled her on the risks and steps of the PNE in office placement where we would inject Lidocaine for localized anesthesia and based on the landmarks of her back we will place the lead stimulator into the S3 foramen to stimulate the pudendal nerve in hopes of improving OAB. We would give her an antibiotic after the procedure to prevent infection. The risks include discomfort, bleeding, infection, and since we placing the lead based off landmarks there is a chance of not placing the lead into the correct location. If we do not get a good response from the PNE trial we could consider placing the lead in the OR with fluoroscopic X-ray to ensure that the lead is placed into the S3 foramen. During the  PNE trial period (i.e. around x5 days) she will keep a bladder diary to keep track of her symptoms to determine if she has had 50% or greater in her symptom improvement. Reviewed that if symptoms are at least 50% improved during the trial period we would implant the permanent device in the OR under sedation.  > The patient is interested in pursuing SNM > intradetrusor Botox injections.   - Gave her PI handout on SNM and intradetrusor Botox injections to review and consider.   - Plan to obtain UDS @ [facility] prior to pursuing PNE trial as she is endorsing symptoms of retention with feeling the urge to urine with no urine volume output being voided.   - Ordered urodynamics to better characterize her voiding/bladder filling patterns and further characterize her urinary issues.    UDS  Calibrated electronic equipment was used throughout testing.    49792 Complex Urinary Flow Study:   Unable to void. Cathed for 200 cc    80975 Complex cystometrogram with  and UPP-Simultaneous measurement of intraabdominal pressure using a vaginal/rectal catheter and bladder pressure using a urethral catheter. Cystometry was done.    Fill rate: 100 ml/min  First sensation occurred at 241 ml at a detrusor pressure of 7 cm H2O   Strong desire to void occurred at 328 ml with a detrusor pressure of 11 cm H2O.   Maximum capacity of 437 ml with a detrusor pressure of 16 cm H2O.  DO: none seen  GILBERT: none seen    Urethral Pressure Profile:   MUCP was not well captured    Pressure Flow Study 58271 Voiding pressure studies ()? intraabdominal voiding pressure (AP)  A voiding pressure study was completed and performed utilizing both bladder and rectal catheters for detrusor, vesical, and abdominal pressure measurement.    Unable to void with catheters in place.   Transferred to [facility] and voided 50 ml.   PVR was 400.     Per Ade's note, she is extremely constipated.    66682 EMG of anal or urethral sphincter, other than needle, any  technique  Electromyography: Provocative maneuvers produced appropriate  changes in waveforms. The EMG shows increased EMG activity with increased intraabdominal pressure. There was a decrease in the EMG activity during voiding consistent with normal function of the pelvic floor.    Summary  Normal capacity and compliance (24), normal sensation. No GILBERT or DO seen.   She was unable to void for Uroflow or on pressure-flow study.  and 400 each time. Per Ade's note, she is very constipated.   Based on this, would avoid Botox and pursue SNM for treatment of UUI, however it is unclear if her detrusor contracts based on the limitations of this study.    Danielle Hinojosa MD MPH

## 2024-05-17 ENCOUNTER — HOSPITAL ENCOUNTER (OUTPATIENT)
Dept: CARDIOLOGY | Facility: HOSPITAL | Age: 79
Discharge: HOME | End: 2024-05-17
Payer: MEDICARE

## 2024-05-17 DIAGNOSIS — R06.02 SHORTNESS OF BREATH: ICD-10-CM

## 2024-05-17 PROCEDURE — 93306 TTE W/DOPPLER COMPLETE: CPT | Performed by: INTERNAL MEDICINE

## 2024-05-17 PROCEDURE — 93306 TTE W/DOPPLER COMPLETE: CPT

## 2024-05-21 ENCOUNTER — OFFICE VISIT (OUTPATIENT)
Dept: OBSTETRICS AND GYNECOLOGY | Facility: CLINIC | Age: 79
End: 2024-05-21
Payer: MEDICARE

## 2024-05-21 VITALS
DIASTOLIC BLOOD PRESSURE: 70 MMHG | SYSTOLIC BLOOD PRESSURE: 154 MMHG | BODY MASS INDEX: 38.62 KG/M2 | WEIGHT: 218 LBS | HEIGHT: 63 IN

## 2024-05-21 DIAGNOSIS — N32.81 OAB (OVERACTIVE BLADDER): Primary | ICD-10-CM

## 2024-05-21 DIAGNOSIS — R33.9 URINARY RETENTION: ICD-10-CM

## 2024-05-21 DIAGNOSIS — K59.00 CONSTIPATION, UNSPECIFIED CONSTIPATION TYPE: ICD-10-CM

## 2024-05-21 PROCEDURE — 99214 OFFICE O/P EST MOD 30 MIN: CPT | Performed by: OBSTETRICS & GYNECOLOGY

## 2024-05-21 PROCEDURE — 3077F SYST BP >= 140 MM HG: CPT | Performed by: OBSTETRICS & GYNECOLOGY

## 2024-05-21 PROCEDURE — 1157F ADVNC CARE PLAN IN RCRD: CPT | Performed by: OBSTETRICS & GYNECOLOGY

## 2024-05-21 PROCEDURE — 1036F TOBACCO NON-USER: CPT | Performed by: OBSTETRICS & GYNECOLOGY

## 2024-05-21 PROCEDURE — 3078F DIAST BP <80 MM HG: CPT | Performed by: OBSTETRICS & GYNECOLOGY

## 2024-05-21 PROCEDURE — 1159F MED LIST DOCD IN RCRD: CPT | Performed by: OBSTETRICS & GYNECOLOGY

## 2024-05-21 ASSESSMENT — ENCOUNTER SYMPTOMS
CONSTITUTIONAL NEGATIVE: 1
RESPIRATORY NEGATIVE: 1
CONSTIPATION: 1
EYES NEGATIVE: 1
CARDIOVASCULAR NEGATIVE: 1
NEUROLOGICAL NEGATIVE: 1
PSYCHIATRIC NEGATIVE: 1
ENDOCRINE NEGATIVE: 1
MUSCULOSKELETAL NEGATIVE: 1

## 2024-05-21 NOTE — PATIENT INSTRUCTIONS
Improve your constipation by taking miralax and dietary fiber.   You can start by taking a fiber supplement like metamucil every day.  Add miralax 1 or 2 times a day to achieve a soft, easy to pass bowel movement at least 3 times a week.       Fiber Therapy:    Fiber acts in the colon (large bowel) to make the stool soft.  If the stool is too hard, the fiber draws water in and makes it softer.  If the stool is too watery, it acts like a 'sponge' and soaks up the liquid.     Many foods contain fiber. Fruits, vegetables, whole grains, and high fiber cereals all contain some fiber. Unfortunately, most of us don’t eat enough and a fiber supplement is a good way to increase soluble fiber intake.     Supplemental fiber comes in many forms. You could choose from:    1.   Powder  2.   Tablets  3.   Wafers/Cookies/Gummies    Some common brands include:    1.   Benefiber (tasteless powder)  2.   Metamucil (powder)  3.   Konsyl (powder)  4.   Citrucel (powder, may cause less gas)  5.   Fiber-Con (tablet)    All of these products work in the same way, but some may work better than others for you.    Dosin.   One tablespoon of powder dissolves in 8-16 oz of water or juice OR  2.   Two tablets with 8-16 oz water or juice OR  3.   Two fiber wafers/cookies with 8-16 oz of water or juice    Take fiber in the morning. Start off using it once per day. You can then increase frequency if needed.    IF FIBER IS NOT TAKEN WITH ADEQUATE WATER/FLUID INTAKE, IT MAY BE CONSTIPATING.  DRINK 6-8 GLASSES OF WATER/LIQUIDS THROUGHOUT THE DAY WHILE TAKING FIBER SUPPLEMENTATION.    Fiber can cause gas/bloating, especially when first starting the treatment.  If this is the case, you can take simethicone (Gas-X) over the counter after meals and at bedtime as needed.    Summary:  1.  Remember: the most common cause of constipation is inadequate water intake during the day. Avoiding dehydration is usually the key to success with fiber  supplementation.  2. Using fiber requires some experimentation- if one brand doesn’t work or causes excessive gas, take another. Also, try varying the form of fiber- for example, if the powder is unpalatable; take the tablets or wafers instead.  3. You may need more than one dose per day- feel free to increase your dose to morning and mid-day if that works better.  4. Results depend on consistency of usage- the more consistent you are in taking fiber, the better the results!

## 2024-05-21 NOTE — PROGRESS NOTES
[facility] Department of Urogynecology   Danielle Hinojosa MD, MPH   [phone]    ASSESSMENT AND PLAN:   78 year old female with constipation and UUI/OAB. Comorbidities include: HTN, diastolic heart failure, GERD, ROMULO, epilepsy, and rheumatoid arthritis.      Diagnoses:  #1 Urge incontinence   #2 Urinary retention  #3 Refractory overactive bladder due to severe constipation      Plan:  1. UUI, refractory OAB and urinary retention due to severe constipation   - Patient previously failed Myrbetriq and tried Trospium but that her neurologist advised her to discontinue Trospium due to this negatively interacting with her epilepsy medication.   - We reviewed her UDS results from 5/7/2024 which demonstrated normal capacity and compliance (24) and normal sensation. No GILBERT or DO seen. She was unable to void for Uroflow or on pressure-flow study.  and 400 each time. Per Ade's note, she is very constipated. Based on this study and results, we would avoid Botox and pursue SNM for treatment of UUI, however it is unclear if her detrusor contracts based on the limitations of this study.  - We discussed that constipation can cause OAB symptoms and urinary retention. Given that she had significant constipation on UDS study and endorses this at baseline, we recommended constipation management with a daily fiber supplement (I.e. Metamucil or Benefiber) and adding MiraLAX as needed to further reduce her urinary symptoms. The goal is to have a soft/easy to pass BM at least 3x/week. If she is not achieving a [place] scale #4 stool consistency, we advised her to try MiraLAX and to titrate the dose up or down based on stool consistency (I.e. titrate MiraLAX down if she has diarrhea).    > Gave her PI handout on different types of fiber to try at home.  - If she continues to have urinary urgency and UUI after managing constipation with a bowel regimen, we will re-discuss pursuing third line OAB therapy SNM as she is not  a good candidate for intradetrusor Botox due to having urinary retention with difficulty emptying her bladder at baseline and retention was noted on UDS study with elevated PVR.   > Gave her PI handout on SNM to review and consider.     Follow up in 4 weeks with Dr. Danielle Hinojosa for constipation and refractory OAB symptoms.     Scribe Attestation  By signing my name below, IStanton, Scribe, attest that this documentation has been prepared under the direction and in the presence of Danielle Hinojosa MD MPH on 05/21/2024 at 1:29 PM.     Problem List Items Addressed This Visit    None  Visit Diagnoses       OAB (overactive bladder)    -  Primary    Urinary retention        Constipation, unspecified constipation type               I spent a total of 30 minutes in face to face and non face to face time.      Danielle Hinojosa MD, MPH, FACOG   I Dr. Hinojosa, personally performed the services described in the documentation as scribed in my presence and confirm it is both complete and accurate.  Danielle Hinojosa MD, MPH, FACOG      Established    HISTORY OF PRESENT ILLNESS:   78 year old female following up on OAB/UUI to discuss her treatment plan after we review UDS results.     Record Review:   - 5/7/2024 UDS Results: Normal capacity and compliance (24), normal sensation. No GILBERT or DO seen. She was unable to void for Uroflow or on pressure-flow study.  and 400 each time. Per Ade's note, she is very constipated. Based on this study and results, we would avoid Botox and pursue SNM for treatment of UUI, however it is unclear if her detrusor contracts based on the limitations of this study.  - 4/23/2024 Dr. Danielle Hinojosa note RE: OAB/UUI - Previously failed Myrbetriq and tried Trospium but that her neurologist advised her to discontinue Trospium due to this negatively interacting with her epilepsy medication. We discussed alternative third-line OAB treatment options and she is interested in SNM > Botox. Gave her  PI handouts on SNM and intradetrusor Botox injections to review and consider. Ordered UDS @ [facility] prior to pursuing PNE trial as she is endorsing symptoms of retention with feeling the urge to urine with no urine volume output being voided.   - 3/25/2024 ALENA Barrientos-CNP note RE: OAB/UUI - Discussed treatment options for OAB. She has tried medications in the past that she could consider restarting or may consider third line therapies such as intradetrusor Botox or SNM. She should avoid anticholinergics. Discussed Botox and referred her to Dr. Hinojosa.      Urinary Symptoms:   - Endorses UUI.   - Nocturia 2-3x per night.   - She reports feeling that she does not empty her bladder well.     Bowel Symptoms:  - She does endorse experiencing constipation at baseline and currently. She has not tried any fiber or stool softeners to help with this.   - Does endorse pushing/straining to pass stool due to hard stool consistency.     Social History:  - Lives with her adult son who has Autism.        Past Medical History:     Past Medical History:   Diagnosis Date    Ataxia, unspecified     Ataxia    Other conditions influencing health status     Cognitive Functions Current Level Impaired Mild    Other conditions influencing health status     Screening for malignant neoplasms, colon    Other specified anxiety disorders     Depression with anxiety    Other specified anxiety disorders     Depression with anxiety    Pain in unspecified joint     Multiple joint pain    Personal history of other diseases of the circulatory system 09/27/2018    History of hypertension    Personal history of other diseases of the nervous system and sense organs     History of sleep apnea    Personal history of other specified conditions     History of fatigue    Umbilical hernia without obstruction or gangrene     Umbilical hernia    Unspecified cataract     Cataract of both eyes    Unspecified convulsions (Multi)     Seizure     Unspecified convulsions (Multi)     Convulsive disorder          Past Surgical History:     Past Surgical History:   Procedure Laterality Date    CATARACT EXTRACTION  07/17/2013    Cataract Surgery    COLONOSCOPY  03/11/2013    Complete Colonoscopy    CT ANGIO NECK  5/22/2020    CT NECK ANGIO W AND WO IV CONTRAST 5/22/2020 [facility]    CT HEAD ANGIO W AND WO IV CONTRAST  1/6/2016    CT HEAD ANGIO W AND WO IV CONTRAST 1/6/2016 [facility]    CT HEAD ANGIO W AND WO IV CONTRAST  3/1/2016    CT HEAD ANGIO W AND WO IV CONTRAST 3/1/2016 [facility]    CT HEAD ANGIO W AND WO IV CONTRAST  5/22/2020    CT HEAD ANGIO W AND WO IV CONTRAST 5/22/2020 [facility]    OTHER SURGICAL HISTORY  03/07/2013    Excision Of Lesion Scalp    OTHER SURGICAL HISTORY  06/08/2016    Intracranial Aneurysm Repair    OTHER SURGICAL HISTORY  07/21/2020    Colonoscopy complete for polypectomy    OTHER SURGICAL HISTORY  05/12/2020    Cyst excision    OTHER SURGICAL HISTORY  05/12/2020    [place] tooth extraction         Medications:     Prior to Admission medications    Medication Sig Start Date End Date Taking? Authorizing Provider   amLODIPine (Norvasc) 5 mg tablet Take 1 tablet (5 mg) by mouth every 12 hours. 4/2/24 4/2/25  Francie Herrera, APRN-CNP   antiox.mv no.10/omeg3s/lut/day (I-CAPS ORAL) Take 1 capsule by mouth once daily. As directed    Historical Provider, MD   atorvastatin (Lipitor) 40 mg tablet Take 1 tablet (40 mg) by mouth once daily. 4/2/24 4/2/25  Francie Herrera, ALENA-CNP   cholecalciferol (Vitamin D-3) 50 mcg (2,000 unit) capsule Take 1 capsule (50 mcg) by mouth once daily.    Historical Provider, MD   cyanocobalamin (Vitamin B-12) 100 mcg tablet Take 1 tablet (100 mcg) by mouth once daily.    Historical Provider, MD   folic acid (Folvite) 1 mg tablet Take 1 tablet (1 mg) by mouth once daily. 2/9/24 2/8/25  Aura Mckenzie MD   furosemide (Lasix) 40 mg tablet TAKE 1 TABLET BY MOUTH  "EVERY DAY AS NEEDED 10/23/23   Francie Herrera, APRN-CNP   guaiFENesin (Mucinex) 600 mg 12 hr tablet Take 1 tablet (600 mg) by mouth once daily as needed for congestion.    Historical Provider, MD   hydroxychloroquine (Plaquenil) 200 mg tablet TAKE 1 TABLET BY MOUTH TWICE DAILY WITH FOOD 4/8/24   Aura Mckenzie MD   ibuprofen-diphenhydramine cit 200-38 mg tablet per tablet Take 2 tablets by mouth once daily at bedtime. 12/28/21   Historical Provider, MD   lidocaine 4 % patch Place 1 patch over 12 hours on the skin once daily. Remove & discard patch within 12 hours or as directed by MD. Do not start before March 9, 2024. 3/9/24   Elena Hernandez PA-C   magnesium oxide (Mag-Ox) 250 mg magnesium tablet Take 1 tablet (250 mg) by mouth. Once or twice daily to relax muscles    Historical Provider, MD   methotrexate (Trexall) 2.5 mg tablet Take 5 tablets (12.5 mg total) by mouth 1 (one) time per week. 5/5/24 5/5/25  Aura Mckenzie MD   omeprazole OTC (PriLOSEC OTC) 20 mg EC tablet Take 2 tablets (40 mg) by mouth once daily in the morning. Take before meals. 7/10/23   Historical Provider, MD   OXcarbazepine (Trileptal) 300 mg tablet Take 3.5 tablets (1,050 mg) by mouth 2 times a day. 2/6/24   Kashif Pelaez MD   potassium chloride (Klor-Con) 20 mEq packet Take 20 mEq by mouth once daily.    Historical Provider, MD   sertraline (Zoloft) 100 mg tablet TAKE 1 TABLET ONCE DAILY.  Patient taking differently: Take 1 tablet (100 mg) by mouth once daily at bedtime. 7/16/23   MD ALONZO Mays  Review of Systems   Constitutional: Negative.    HENT: Negative.     Eyes: Negative.    Respiratory: Negative.     Cardiovascular: Negative.    Gastrointestinal:  Positive for constipation.   Endocrine: Negative.    Genitourinary:  Positive for enuresis and urgency.   Musculoskeletal: Negative.    Neurological: Negative.    Psychiatric/Behavioral: Negative.            PHYSICAL EXAM:    /70   Ht 1.6 m (5' 3\")  "  Wt 98.9 kg (218 lb)   BMI 38.62 kg/m²   No LMP recorded. Patient is postmenopausal.    Declines chaperone for physical exam.      Well developed, well nourished, in no apparent distress.   Neurologic/Psychiatric:  Awake, Alert and Oriented times 3.  Affect normal.         Data and DIAGNOSTIC STUDIES REVIEWED   No results found.   Lab Results   Component Value Date    URINECULTURE No growth 03/07/2024      Lab Results   Component Value Date    GLUCOSE 84 03/08/2024    CALCIUM 8.1 (L) 03/08/2024     (L) 03/08/2024    K 4.4 03/08/2024    CO2 22 03/08/2024     03/08/2024    BUN 16 03/08/2024    CREATININE 0.81 03/08/2024     Lab Results   Component Value Date    WBC 4.8 03/08/2024    HGB 10.4 (L) 03/08/2024    HCT 31.0 (L) 03/08/2024    MCV 96 03/08/2024     (L) 03/08/2024

## 2024-05-22 ENCOUNTER — APPOINTMENT (OUTPATIENT)
Dept: CARDIOLOGY | Facility: HOSPITAL | Age: 79
End: 2024-05-22
Payer: MEDICARE

## 2024-05-27 LAB
AORTIC VALVE MEAN GRADIENT: 10.4 MMHG
AORTIC VALVE PEAK VELOCITY: 2.28 M/S
AV PEAK GRADIENT: 20.7 MMHG
AVA (PEAK VEL): 1.67 CM2
AVA (VTI): 1.73 CM2
EJECTION FRACTION APICAL 4 CHAMBER: 66.8
LEFT ATRIUM VOLUME AREA LENGTH INDEX BSA: 29.7 ML/M2
LEFT VENTRICLE INTERNAL DIMENSION DIASTOLE: 4.04 CM (ref 3.5–6)
LEFT VENTRICULAR OUTFLOW TRACT DIAMETER: 1.99 CM
LV EJECTION FRACTION BIPLANE: 65 %
MITRAL VALVE E/A RATIO: 0.81
MITRAL VALVE E/E' RATIO: 11.18
RIGHT VENTRICLE FREE WALL PEAK S': 12 CM/S
RIGHT VENTRICLE PEAK SYSTOLIC PRESSURE: 42.8 MMHG
TRICUSPID ANNULAR PLANE SYSTOLIC EXCURSION: 2.3 CM

## 2024-05-28 ENCOUNTER — TELEPHONE (OUTPATIENT)
Dept: CARDIOLOGY | Facility: HOSPITAL | Age: 79
End: 2024-05-28
Payer: MEDICARE

## 2024-05-28 NOTE — TELEPHONE ENCOUNTER
----- Message from JONES Marie sent at 5/27/2024 11:25 AM EDT -----  Please call and let her know her LV function was normal with mild aortic stenosis   Thanks   ----- Message -----  From: Sisi Syngo - Cardiology Results In  Sent: 5/27/2024  10:47 AM EDT  To: JONES aMrie

## 2024-06-18 ENCOUNTER — APPOINTMENT (OUTPATIENT)
Dept: OBSTETRICS AND GYNECOLOGY | Facility: CLINIC | Age: 79
End: 2024-06-18
Payer: MEDICARE

## 2024-06-25 ENCOUNTER — APPOINTMENT (OUTPATIENT)
Dept: OBSTETRICS AND GYNECOLOGY | Facility: CLINIC | Age: 79
End: 2024-06-25
Payer: MEDICARE

## 2024-06-25 VITALS — BODY MASS INDEX: 38.62 KG/M2 | DIASTOLIC BLOOD PRESSURE: 70 MMHG | SYSTOLIC BLOOD PRESSURE: 152 MMHG | WEIGHT: 218 LBS

## 2024-06-25 DIAGNOSIS — N39.41 URGE INCONTINENCE: ICD-10-CM

## 2024-06-25 LAB
POC APPEARANCE, URINE: CLEAR
POC BILIRUBIN, URINE: NEGATIVE
POC BLOOD, URINE: NEGATIVE
POC COLOR, URINE: ABNORMAL
POC GLUCOSE, URINE: NEGATIVE MG/DL
POC KETONES, URINE: NEGATIVE MG/DL
POC LEUKOCYTES, URINE: ABNORMAL
POC NITRITE,URINE: NEGATIVE
POC PH, URINE: 8.5 PH
POC PROTEIN, URINE: NEGATIVE MG/DL
POC SPECIFIC GRAVITY, URINE: <=1.005
POC UROBILINOGEN, URINE: 0.2 EU/DL

## 2024-06-25 PROCEDURE — 3078F DIAST BP <80 MM HG: CPT | Performed by: OBSTETRICS & GYNECOLOGY

## 2024-06-25 PROCEDURE — 1159F MED LIST DOCD IN RCRD: CPT | Performed by: OBSTETRICS & GYNECOLOGY

## 2024-06-25 PROCEDURE — 3077F SYST BP >= 140 MM HG: CPT | Performed by: OBSTETRICS & GYNECOLOGY

## 2024-06-25 PROCEDURE — 81003 URINALYSIS AUTO W/O SCOPE: CPT | Performed by: OBSTETRICS & GYNECOLOGY

## 2024-06-25 PROCEDURE — 1157F ADVNC CARE PLAN IN RCRD: CPT | Performed by: OBSTETRICS & GYNECOLOGY

## 2024-06-25 PROCEDURE — 99214 OFFICE O/P EST MOD 30 MIN: CPT | Performed by: OBSTETRICS & GYNECOLOGY

## 2024-06-25 ASSESSMENT — ENCOUNTER SYMPTOMS
NEUROLOGICAL NEGATIVE: 1
PSYCHIATRIC NEGATIVE: 1
CONSTITUTIONAL NEGATIVE: 1
EYES NEGATIVE: 1
MUSCULOSKELETAL NEGATIVE: 1
ENDOCRINE NEGATIVE: 1
CONSTIPATION: 1
CARDIOVASCULAR NEGATIVE: 1
RESPIRATORY NEGATIVE: 1

## 2024-06-25 NOTE — PROGRESS NOTES
[facility] Department of Urogynecology   Danielle Hinojosa MD, MPH   [phone]    ASSESSMENT AND PLAN:   79 year old female with constipation and UUI/OAB. Comorbidities include: HTN, diastolic heart failure, GERD, ROMULO, epilepsy, and rheumatoid arthritis.      Diagnoses:  #1 Urge incontinence   #2 Refractory overactive bladder due to severe constipation   #3 urinary retention    Plan:  1. UUI, refractory OAB and urinary retention due to severe constipation   - Previously failed Myrbetriq and tried Trospium but that her neurologist advised her to discontinue Trospium due to this negatively interacting with her epilepsy medication. Patient had UDS on 5/7/2024 and did not leak (no GILBERT or DO seen) but could not void as her PVR was 200 and 400mL each time with severe constipation.   - We advised her to take MiraLAX 1 capful up to BID and daily fiber supplement with a goal of having a soft/easy to pass BM at least 3x/week as she has noticed that her constipation is correlated with her bladder control/UUI.     Follow up in 6 months with Dr. Danielle Hinojosa.     Scribe Attestation  By signing my name below, I, Stanton Méndez, Judith, attest that this documentation has been prepared under the direction and in the presence of Danielle Hinojosa MD MPH on 06/25/2024 at 6:01 PM.     Problem List Items Addressed This Visit    None  Visit Diagnoses       Urge incontinence        Relevant Orders    POCT UA Automated manually resulted (Completed)           I spent a total of 30 minutes in face to face and non face to face time.     I Dr. Hinojosa, personally performed the services described in the documentation as scribed in my presence and confirm it is both complete and accurate.  Danielle Hinojosa MD, MPH, FACOG           Established    HISTORY OF PRESENT ILLNESS:   79 year old female following up on UUI and constipation.     Record Review:   - 5/21/2024 Dr. Danielle Hinojosa note RE: UUI and refractory OAB due to severe  constipation - Patient previously failed Myrbetriq and tried Trospium but that her neurologist advised her to discontinue Trospium due to this negatively interacting with her epilepsy medication. Previously had UDS on 5/7/2024 and did not leak (no GILBERT or DO seen) but could not void as her PVR was 200 and 400mL each time with severe constipation. We counseled her on a good bowel regimen with fiber and MiraLAX to see if constipation management improves her bladder control. We do not recommend Botox until we can confirm that she is not retaining urine. Gave her PI handout on SNM to review and consider.   - 5/7/2024 UDS Results: Normal capacity and compliance (24), normal sensation. No GILBERT or DO seen. She was unable to void for Uroflow or on pressure-flow study.  and 400 each time. Per Ade's note, she is very constipated. Based on this study and results, we would avoid Botox and pursue SNM for treatment of UUI, however it is unclear if her detrusor contracts based on the limitations of this study.  - 4/23/2024 Dr. Danielle Hinojosa note RE: OAB/UUI - Previously failed Myrbetriq and tried Trospium but that her neurologist advised her to discontinue Trospium due to this negatively interacting with her epilepsy medication. We discussed alternative third-line OAB treatment options and she is interested in SNM > Botox. Gave her PI handouts on SNM and intradetrusor Botox injections to review and consider. Ordered UDS @ [facility] prior to pursuing PNE trial as she is endorsing symptoms of retention with feeling the urge to urine with no urine volume output being voided.   - 3/25/2024 ALENA Barrientos-CNP note RE: OAB/UUI - Discussed treatment options for OAB. She has tried medications in the past that she could consider restarting or may consider third line therapies such as intradetrusor Botox or SNM. She should avoid anticholinergics. Discussed Botox and referred her to Dr. Hinojosa.      Urinary Symptoms:   -  Endorses OAB with UUI episodes when she is constipated.     Bowel Symptoms:   - She reports that she has been taking fiber supplements occasionally which sometimes help but she recently experienced constipation for about x2 weeks. She mistakenly doubled up on fiber intake instead of taking MiraLAX which she later corrected.   She notes that when her bowel movements are regular, her bladder symptoms improve. She has been experimenting with the dosage and method of taking fiber and MiraLAX and found that hot water helps dissolve the fiber better.   - Patient typically aims for soft easy-to-pass bowel movements at least three times a week but she has been experiencing significant constipation which she believes is affecting her bladder control.  - She has noticed increased # of UUI episodes when she is constipated.        Past Medical History:     Past Medical History:   Diagnosis Date    Ataxia, unspecified     Ataxia    Other conditions influencing health status     Cognitive Functions Current Level Impaired Mild    Other conditions influencing health status     Screening for malignant neoplasms, colon    Other specified anxiety disorders     Depression with anxiety    Other specified anxiety disorders     Depression with anxiety    Pain in unspecified joint     Multiple joint pain    Personal history of other diseases of the circulatory system 09/27/2018    History of hypertension    Personal history of other diseases of the nervous system and sense organs     History of sleep apnea    Personal history of other specified conditions     History of fatigue    Umbilical hernia without obstruction or gangrene     Umbilical hernia    Unspecified cataract     Cataract of both eyes    Unspecified convulsions (Multi)     Seizure    Unspecified convulsions (Multi)     Convulsive disorder          Past Surgical History:     Past Surgical History:   Procedure Laterality Date    CATARACT EXTRACTION  07/17/2013    Cataract  Surgery    COLONOSCOPY  03/11/2013    Complete Colonoscopy    CT ANGIO NECK  5/22/2020    CT NECK ANGIO W AND WO IV CONTRAST 5/22/2020 [facility]    CT HEAD ANGIO W AND WO IV CONTRAST  1/6/2016    CT HEAD ANGIO W AND WO IV CONTRAST 1/6/2016 [facility]    CT HEAD ANGIO W AND WO IV CONTRAST  3/1/2016    CT HEAD ANGIO W AND WO IV CONTRAST 3/1/2016 [facility] INPATIENT [facility]GAC[facility]    CT HEAD ANGIO W AND WO IV CONTRAST  5/22/2020    CT HEAD ANGIO W AND WO IV CONTRAST 5/22/2020 [facility]    OTHER SURGICAL HISTORY  03/07/2013    Excision Of Lesion Scalp    OTHER SURGICAL HISTORY  06/08/2016    Intracranial Aneurysm Repair    OTHER SURGICAL HISTORY  07/21/2020    Colonoscopy complete for polypectomy    OTHER SURGICAL HISTORY  05/12/2020    Cyst excision    OTHER SURGICAL HISTORY  05/12/2020    [place] tooth extraction         Medications:     Prior to Admission medications    Medication Sig Start Date End Date Taking? Authorizing Provider   amLODIPine (Norvasc) 5 mg tablet Take 1 tablet (5 mg) by mouth every 12 hours. 4/2/24 4/2/25  Francie Herrera APRN-CNP   antiox.mv no.10/omeg3s/lut/day (I-CAPS ORAL) Take 1 capsule by mouth once daily. As directed    Historical Provider, MD   atorvastatin (Lipitor) 40 mg tablet Take 1 tablet (40 mg) by mouth once daily. 4/2/24 4/2/25  Francie Herrera APRN-CNP   cholecalciferol (Vitamin D-3) 50 mcg (2,000 unit) capsule Take 1 capsule (50 mcg) by mouth once daily.    Historical Provider, MD   cyanocobalamin (Vitamin B-12) 100 mcg tablet Take 1 tablet (100 mcg) by mouth once daily.    Historical Provider, MD   folic acid (Folvite) 1 mg tablet Take 1 tablet (1 mg) by mouth once daily. 2/9/24 2/8/25  Aura Mckenzie MD   furosemide (Lasix) 40 mg tablet TAKE 1 TABLET BY MOUTH EVERY DAY AS NEEDED 10/23/23   Francie Herrera APRN-CNP   guaiFENesin (Mucinex) 600 mg 12 hr tablet Take 1 tablet (600 mg) by mouth once daily as needed for congestion.    Historical  Provider, MD   hydroxychloroquine (Plaquenil) 200 mg tablet TAKE 1 TABLET BY MOUTH TWICE DAILY WITH FOOD 4/8/24   Aura Mckenzie MD   ibuprofen-diphenhydramine cit 200-38 mg tablet per tablet Take 2 tablets by mouth once daily at bedtime. 12/28/21   Historical Provider, MD   lidocaine 4 % patch Place 1 patch over 12 hours on the skin once daily. Remove & discard patch within 12 hours or as directed by MD. Do not start before March 9, 2024. 3/9/24   Elena Hernandez PA-C   magnesium oxide (Mag-Ox) 250 mg magnesium tablet Take 1 tablet (250 mg) by mouth. Once or twice daily to relax muscles    Historical Provider, MD   methotrexate (Trexall) 2.5 mg tablet Take 5 tablets (12.5 mg total) by mouth 1 (one) time per week. 5/5/24 5/5/25  Aura Mckenzie MD   omeprazole OTC (PriLOSEC OTC) 20 mg EC tablet Take 2 tablets (40 mg) by mouth once daily in the morning. Take before meals. 7/10/23   Historical Provider, MD   OXcarbazepine (Trileptal) 300 mg tablet Take 3.5 tablets (1,050 mg) by mouth 2 times a day. 2/6/24   Kashif Pelaez MD   potassium chloride (Klor-Con) 20 mEq packet Take 20 mEq by mouth once daily.    Historical Provider, MD   sertraline (Zoloft) 100 mg tablet TAKE 1 TABLET ONCE DAILY.  Patient taking differently: Take 1 tablet (100 mg) by mouth once daily at bedtime. 7/16/23   MD ALONZO Mays  Review of Systems   Constitutional: Negative.    HENT: Negative.     Eyes: Negative.    Respiratory: Negative.     Cardiovascular: Negative.    Gastrointestinal:  Positive for constipation.   Endocrine: Negative.    Genitourinary:  Positive for enuresis and urgency.   Musculoskeletal: Negative.    Neurological: Negative.    Psychiatric/Behavioral: Negative.            PHYSICAL EXAM:    /70   Wt 98.9 kg (218 lb)   BMI 38.62 kg/m²   No LMP recorded. Patient is postmenopausal.    Declines chaperone for physical exam.      Well developed, well nourished, in no apparent distress.    Neurologic/Psychiatric:  Awake, Alert and Oriented times 3.  Affect normal.       Data and DIAGNOSTIC STUDIES REVIEWED   No results found.   Lab Results   Component Value Date    URINECULTURE No growth 03/07/2024      Lab Results   Component Value Date    GLUCOSE 84 03/08/2024    CALCIUM 8.1 (L) 03/08/2024     (L) 03/08/2024    K 4.4 03/08/2024    CO2 22 03/08/2024     03/08/2024    BUN 16 03/08/2024    CREATININE 0.81 03/08/2024     Lab Results   Component Value Date    WBC 4.8 03/08/2024    HGB 10.4 (L) 03/08/2024    HCT 31.0 (L) 03/08/2024    MCV 96 03/08/2024     (L) 03/08/2024

## 2024-08-15 ENCOUNTER — APPOINTMENT (OUTPATIENT)
Dept: RHEUMATOLOGY | Facility: CLINIC | Age: 79
End: 2024-08-15
Payer: MEDICARE

## 2024-11-27 ENCOUNTER — HOSPITAL ENCOUNTER (EMERGENCY)
Facility: HOSPITAL | Age: 79
Discharge: HOME | End: 2024-11-27
Attending: EMERGENCY MEDICINE
Payer: MEDICARE

## 2024-11-27 ENCOUNTER — APPOINTMENT (OUTPATIENT)
Dept: RADIOLOGY | Facility: HOSPITAL | Age: 79
End: 2024-11-27
Payer: MEDICARE

## 2024-11-27 VITALS
TEMPERATURE: 97.5 F | RESPIRATION RATE: 18 BRPM | HEART RATE: 63 BPM | OXYGEN SATURATION: 100 % | WEIGHT: 220 LBS | SYSTOLIC BLOOD PRESSURE: 153 MMHG | HEIGHT: 61 IN | DIASTOLIC BLOOD PRESSURE: 71 MMHG | BODY MASS INDEX: 41.54 KG/M2
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
## 2024-11-27 DIAGNOSIS — R42 VERTIGO: ICD-10-CM

## 2024-11-27 DIAGNOSIS — S09.90XA HEAD INJURY, INITIAL ENCOUNTER: Primary | ICD-10-CM

## 2024-11-27 LAB
ANION GAP SERPL CALC-SCNC: 17 MMOL/L (ref 10–20)
BASOPHILS # BLD AUTO: 0.04 X10*3/UL (ref 0–0.1)
BASOPHILS NFR BLD AUTO: 0.7 %
BUN SERPL-MCNC: 20 MG/DL (ref 6–23)
CALCIUM SERPL-MCNC: 9.2 MG/DL (ref 8.6–10.3)
CHLORIDE SERPL-SCNC: 102 MMOL/L (ref 98–107)
CO2 SERPL-SCNC: 22 MMOL/L (ref 21–32)
CREAT SERPL-MCNC: 0.82 MG/DL (ref 0.5–1.05)
EGFRCR SERPLBLD CKD-EPI 2021: 73 ML/MIN/1.73M*2
EOSINOPHIL # BLD AUTO: 0.09 X10*3/UL (ref 0–0.4)
EOSINOPHIL NFR BLD AUTO: 1.6 %
ERYTHROCYTE [DISTWIDTH] IN BLOOD BY AUTOMATED COUNT: 14.2 % (ref 11.5–14.5)
GLUCOSE SERPL-MCNC: 84 MG/DL (ref 74–99)
HCT VFR BLD AUTO: 39.1 % (ref 36–46)
HGB BLD-MCNC: 12.7 G/DL (ref 12–16)
IMM GRANULOCYTES # BLD AUTO: 0.02 X10*3/UL (ref 0–0.5)
IMM GRANULOCYTES NFR BLD AUTO: 0.4 % (ref 0–0.9)
LYMPHOCYTES # BLD AUTO: 1.71 X10*3/UL (ref 0.8–3)
LYMPHOCYTES NFR BLD AUTO: 29.9 %
MCH RBC QN AUTO: 32.1 PG (ref 26–34)
MCHC RBC AUTO-ENTMCNC: 32.5 G/DL (ref 32–36)
MCV RBC AUTO: 99 FL (ref 80–100)
MONOCYTES # BLD AUTO: 0.84 X10*3/UL (ref 0.05–0.8)
MONOCYTES NFR BLD AUTO: 14.7 %
NEUTROPHILS # BLD AUTO: 3.01 X10*3/UL (ref 1.6–5.5)
NEUTROPHILS NFR BLD AUTO: 52.7 %
NRBC BLD-RTO: 0 /100 WBCS (ref 0–0)
PLATELET # BLD AUTO: 95 X10*3/UL (ref 150–450)
POTASSIUM SERPL-SCNC: 5.3 MMOL/L (ref 3.5–5.3)
RBC # BLD AUTO: 3.96 X10*6/UL (ref 4–5.2)
SODIUM SERPL-SCNC: 136 MMOL/L (ref 136–145)
WBC # BLD AUTO: 5.7 X10*3/UL (ref 4.4–11.3)

## 2024-11-27 PROCEDURE — 99285 EMERGENCY DEPT VISIT HI MDM: CPT | Mod: 25

## 2024-11-27 PROCEDURE — 85025 COMPLETE CBC W/AUTO DIFF WBC: CPT | Performed by: EMERGENCY MEDICINE

## 2024-11-27 PROCEDURE — 12031 INTMD RPR S/A/T/EXT 2.5 CM/<: CPT

## 2024-11-27 PROCEDURE — 82374 ASSAY BLOOD CARBON DIOXIDE: CPT | Performed by: EMERGENCY MEDICINE

## 2024-11-27 PROCEDURE — 72125 CT NECK SPINE W/O DYE: CPT

## 2024-11-27 PROCEDURE — 36415 COLL VENOUS BLD VENIPUNCTURE: CPT | Performed by: EMERGENCY MEDICINE

## 2024-11-27 PROCEDURE — 70450 CT HEAD/BRAIN W/O DYE: CPT | Performed by: RADIOLOGY

## 2024-11-27 PROCEDURE — 70450 CT HEAD/BRAIN W/O DYE: CPT

## 2024-11-27 PROCEDURE — 12001 RPR S/N/AX/GEN/TRNK 2.5CM/<: CPT

## 2024-11-27 PROCEDURE — 2500000004 HC RX 250 GENERAL PHARMACY W/ HCPCS (ALT 636 FOR OP/ED)

## 2024-11-27 PROCEDURE — 72125 CT NECK SPINE W/O DYE: CPT | Performed by: RADIOLOGY

## 2024-11-27 RX ORDER — LIDOCAINE HYDROCHLORIDE AND EPINEPHRINE 10; 10 UG/ML; MG/ML
INJECTION, SOLUTION INFILTRATION; PERINEURAL
Status: COMPLETED
Start: 2024-11-27 | End: 2024-11-27

## 2024-11-27 RX ORDER — LIDOCAINE HYDROCHLORIDE AND EPINEPHRINE 10; 10 UG/ML; MG/ML
5 INJECTION, SOLUTION INFILTRATION; PERINEURAL ONCE
Status: COMPLETED | OUTPATIENT
Start: 2024-11-27 | End: 2024-11-27

## 2024-11-27 ASSESSMENT — PAIN - FUNCTIONAL ASSESSMENT: PAIN_FUNCTIONAL_ASSESSMENT: 0-10

## 2024-11-27 ASSESSMENT — PAIN SCALES - GENERAL
PAINLEVEL_OUTOF10: 0 - NO PAIN
PAINLEVEL_OUTOF10: 1
PAINLEVEL_OUTOF10: 1

## 2024-11-27 ASSESSMENT — PAIN DESCRIPTION - ORIENTATION: ORIENTATION: LEFT

## 2024-11-27 ASSESSMENT — PAIN DESCRIPTION - PAIN TYPE: TYPE: ACUTE PAIN

## 2024-11-27 ASSESSMENT — PAIN DESCRIPTION - LOCATION: LOCATION: HEAD

## 2024-11-27 NOTE — DISCHARGE INSTRUCTIONS
CT scan of head and neck is unremarkable.  Lab work is okay  Staples need to come out in 8 to 10 days.  You may apply topical antibiotic ointment twice a day.  You may wash the wound with soap and water gently.  Be careful using a comb or brush.  Return if acutely worse or new worrisome symptoms.  Please continue with all your regular medications.

## 2024-11-27 NOTE — ED PROVIDER NOTES
Department of Emergency Medicine   ED  Provider Note  Admit Date/RoomTime: 11/27/2024  7:47 AM  ED Room: AC02/AC02                  History of Present Illness:   Mariajose Haq is a 79 y.o. female presenting to the ED for Fall at home, beginning This AM.  The complaint has been  single a event , mild in severity, and worsened by nothing.  Patient says she has vertigo.  She took meclizine this morning.  She says it normally takes about 30 minutes or so for that to take effect.  He states she was ambulating and tripped over something falling hitting her head.  She had no loss of consciousness.  She is not on any blood thinners.  She denies any change in vision or loss of consciousness.  She denies arm or leg pain no hip pain.  No chest pain shortness of breath or abdominal pain.  She states she has chronic vertigo this was similar to her previous episodes of vertigo.  She describes it as feeling a little off balance/dizzy/spinning sensation      Review of Systems:   Pertinent positives and review of systems as noted above.  Remaining 10 review of systems is negative or noncontributory to today's episode of care.  Review of Systems   A complete review of systems is otherwise negative except as noted above    --------------------------------------------- PAST HISTORY ---------------------------------------------  Past Medical History:  has a past medical history of Ataxia, unspecified, Other conditions influencing health status, Other conditions influencing health status, Other specified anxiety disorders, Other specified anxiety disorders, Pain in unspecified joint, Personal history of other diseases of the circulatory system (09/27/2018), Personal history of other diseases of the nervous system and sense organs, Personal history of other specified conditions, Umbilical hernia without obstruction or gangrene, Unspecified cataract, Unspecified convulsions (Multi), and Unspecified convulsions (Multi).    Past Surgical  History:  has a past surgical history that includes Other surgical history (03/07/2013); Colonoscopy (03/11/2013); Other surgical history (06/08/2016); Other surgical history (07/21/2020); Cataract extraction (07/17/2013); Other surgical history (05/12/2020); Other surgical history (05/12/2020); CT angio head w and wo IV contrast (1/6/2016); CT angio head w and wo IV contrast (3/1/2016); CT angio head w and wo IV contrast (5/22/2020); and CT angio neck (5/22/2020).    Social History:  reports that she has never smoked. She has never used smokeless tobacco. She reports that she does not currently use alcohol. She reports that she does not use drugs.    Family History: family history includes Alzheimer's disease in her mother; Autism in her son and another family member; Cancer in her brother; Cardiac problems in her sister; Colon cancer in her father; Coronary artery disease in her paternal grandfather; Diabetes in her father; Gout in her brother; Hypertension in her brother, father, sister, and another family member; Pancreatitis in her brother; Subarachnoid hemorrhage in her father. Unless otherwise noted, family history is non contributory    Patient's Medications   New Prescriptions    No medications on file   Previous Medications    AMLODIPINE (NORVASC) 5 MG TABLET    Take 1 tablet (5 mg) by mouth every 12 hours.    ANTIOX.MV NO.10/OMEG3S/LUT/JOHN (I-CAPS ORAL)    Take 1 capsule by mouth once daily. As directed    ATORVASTATIN (LIPITOR) 40 MG TABLET    Take 1 tablet (40 mg) by mouth once daily.    CHOLECALCIFEROL (VITAMIN D-3) 50 MCG (2,000 UNIT) CAPSULE    Take 1 capsule (50 mcg) by mouth once daily.    CYANOCOBALAMIN (VITAMIN B-12) 100 MCG TABLET    Take 1 tablet (100 mcg) by mouth once daily.    FOLIC ACID (FOLVITE) 1 MG TABLET    Take 1 tablet (1 mg) by mouth once daily.    FUROSEMIDE (LASIX) 40 MG TABLET    TAKE 1 TABLET BY MOUTH EVERY DAY AS NEEDED    GUAIFENESIN (MUCINEX) 600 MG 12 HR TABLET    Take 1  tablet (600 mg) by mouth once daily as needed for congestion.    HYDROXYCHLOROQUINE (PLAQUENIL) 200 MG TABLET    TAKE 1 TABLET BY MOUTH TWICE DAILY WITH FOOD    IBUPROFEN-DIPHENHYDRAMINE -38 MG TABLET PER TABLET    Take 2 tablets by mouth once daily at bedtime.    LIDOCAINE 4 % PATCH    Place 1 patch over 12 hours on the skin once daily. Remove & discard patch within 12 hours or as directed by MD. Do not start before March 9, 2024.    MAGNESIUM OXIDE (MAG-OX) 250 MG MAGNESIUM TABLET    Take 1 tablet (250 mg) by mouth. Once or twice daily to relax muscles    METHOTREXATE (TREXALL) 2.5 MG TABLET    Take 5 tablets (12.5 mg total) by mouth 1 (one) time per week.    OMEPRAZOLE OTC (PRILOSEC OTC) 20 MG EC TABLET    Take 2 tablets (40 mg) by mouth once daily in the morning. Take before meals.    OXCARBAZEPINE (TRILEPTAL) 300 MG TABLET    Take 3.5 tablets (1,050 mg) by mouth 2 times a day.    POTASSIUM CHLORIDE (KLOR-CON) 20 MEQ PACKET    Take 20 mEq by mouth once daily.    SERTRALINE (ZOLOFT) 100 MG TABLET    TAKE 1 TABLET ONCE DAILY.   Modified Medications    No medications on file   Discontinued Medications    No medications on file      The patient’s home medications have been reviewed.    Allergies: Clobazam, Lamotrigine, Levetiracetam, Topiramate, and Penicillins    -------------------------------------------------- RESULTS -------------------------------------------------  All laboratory and radiology results have been personally reviewed by myself   LABS:  Labs Reviewed   CBC WITH AUTO DIFFERENTIAL - Abnormal       Result Value    WBC 5.7      nRBC 0.0      RBC 3.96 (*)     Hemoglobin 12.7      Hematocrit 39.1      MCV 99      MCH 32.1      MCHC 32.5      RDW 14.2      Platelets 95 (*)     Neutrophils % 52.7      Immature Granulocytes %, Automated 0.4      Lymphocytes % 29.9      Monocytes % 14.7      Eosinophils % 1.6      Basophils % 0.7      Neutrophils Absolute 3.01      Immature Granulocytes Absolute,  "Automated 0.02      Lymphocytes Absolute 1.71      Monocytes Absolute 0.84 (*)     Eosinophils Absolute 0.09      Basophils Absolute 0.04     BASIC METABOLIC PANEL - Normal    Glucose 84      Sodium 136      Potassium 5.3      Chloride 102      Bicarbonate 22      Anion Gap 17      Urea Nitrogen 20      Creatinine 0.82      eGFR 73      Calcium 9.2           RADIOLOGY:  Interpreted by Radiologist.  CT head wo IV contrast   Final Result   No acute intracranial pathologic findings are identified.   Left parietal scalp laceration.   Remote bilateral lacunar infarcts. No change in the appearance of the   intracranial structures compared to 03/04/2024.        MACRO:   none        Signed by: Pete Yang 11/27/2024 9:37 AM   Dictation workstation:   [id number]      CT cervical spine wo IV contrast   Final Result   No evidence for a fracture on this exam. Cervical spondylosis as   described. Thyromegaly on the right with the associated 2 cm mass.   Nonemergency thyroid ultrasound is suggested for further assessment.   Atherosclerotic calcific plaque at the carotid bifurcations. There is   no interval change when compared to the previous examination.        MACRO:   none        Signed by: Pete Yang 11/27/2024 9:42 AM   Dictation workstation:   [id number]          No results found for this or any previous visit (from the past 4464 hours).  ------------------------- NURSING NOTES AND VITALS REVIEWED ---------------------------   The nursing notes within the ED encounter and vital signs as below have been reviewed.   /59   Pulse 68   Temp 36.4 °C (97.5 °F) (Oral)   Resp 18   Ht 1.549 m (5' 1\")   Wt 99.8 kg (220 lb)   SpO2 100%   BMI 41.57 kg/m²   Oxygen Saturation Interpretation: Normal      ---------------------------------------------------PHYSICAL EXAM--------------------------------------  Physical Exam  Vitals and nursing note reviewed.   Constitutional:       General: She is not in acute distress.   "   Appearance: She is well-developed. She is not ill-appearing or toxic-appearing.   HENT:      Head: Normocephalic.      Comments: There is a small 2.1 cm laceration over the top left of scalp. Oozing a bit.   No bony crepitus on palpation.  It is not full-thickness to the skull.     Right Ear: Tympanic membrane, ear canal and external ear normal.      Left Ear: Tympanic membrane, ear canal and external ear normal.      Nose: Nose normal. No congestion or rhinorrhea.      Mouth/Throat:      Mouth: Mucous membranes are moist.      Pharynx: Oropharynx is clear. No oropharyngeal exudate or posterior oropharyngeal erythema.   Eyes:      General: No scleral icterus.     Extraocular Movements: Extraocular movements intact.      Conjunctiva/sclera: Conjunctivae normal.      Pupils: Pupils are equal, round, and reactive to light.   Cardiovascular:      Rate and Rhythm: Normal rate and regular rhythm.      Pulses: Normal pulses.      Heart sounds: Normal heart sounds. No murmur heard.  Pulmonary:      Effort: Pulmonary effort is normal. No respiratory distress.      Breath sounds: Normal breath sounds. No wheezing, rhonchi or rales.   Abdominal:      Palpations: Abdomen is soft.      Tenderness: There is no abdominal tenderness. There is no guarding or rebound.   Musculoskeletal:         General: No swelling, tenderness, deformity or signs of injury. Normal range of motion.      Cervical back: Normal range of motion and neck supple.      Right lower leg: No edema.      Left lower leg: No edema.   Skin:     General: Skin is warm and dry.      Capillary Refill: Capillary refill takes less than 2 seconds.      Findings: No rash.   Neurological:      General: No focal deficit present.      Mental Status: She is alert and oriented to person, place, and time.      Cranial Nerves: No cranial nerve deficit.      Sensory: No sensory deficit.      Motor: No weakness.      Coordination: Coordination normal.      Comments: Patient alert  and oriented x 4.  PERRLA, EOMI, no nystagmus.  Finger-nose testing intact.  Cranial nerves intact.  Negative pronator drift.  Finger-to-nose testing is intact.  Negative heel drift.  Motor and sensory are grossly intact.  NIH 0.  Test of skew is normal   Psychiatric:         Mood and Affect: Mood normal.            Laceration Repair    Performed by: Dionicio Chawla DO  Authorized by: Dionicio Chawla DO    Consent:     Consent obtained:  Verbal    Consent given by:  Patient    Risks discussed:  Infection, pain, retained foreign body and need for additional repair    Alternatives discussed:  No treatment  Universal protocol:     Procedure explained and questions answered to patient or proxy's satisfaction: yes      Patient identity confirmed:  Verbally with patient  Anesthesia:     Anesthesia method:  Local infiltration    Local anesthetic:  Lidocaine 1% WITH epi  Laceration details:     Location:  Scalp    Scalp location:  Frontal (Frontal left scalp)    Length (cm):  2.1    Depth (mm):  3  Pre-procedure details:     Preparation:  Patient was prepped and draped in usual sterile fashion  Exploration:     Limited defect created (wound extended): no      Hemostasis achieved with:  Epinephrine and direct pressure    Imaging obtained: x-ray      Imaging outcome: foreign body not noted      Wound exploration: entire depth of wound visualized      Wound extent: no fascia violation noted, no foreign bodies/material noted and no muscle damage noted      Contaminated: yes    Treatment:     Area cleansed with:  Chlorhexidine and saline (Wound spray)    Amount of cleaning:  Extensive    Irrigation method:  Syringe (wound spray)    Visualized foreign bodies/material removed: no      Debridement:  None    Undermining:  None  Skin repair:     Repair method:  Staples    Number of staples:  6  Approximation:     Approximation:  Close  Repair type:     Repair type:  Intermediate  Post-procedure details:     Dressing:  Antibiotic  ointment    Procedure completion:  Tolerated well, no immediate complications    None  ------------------------------ ED COURSE/MEDICAL DECISION MAKING----------------------    Medical Decision Making:   Patient seen and examined by me  Patient is able to ambulate here post CT imaging. No Ataxia.  Will DC home with instructions  F/U with primary care in 8-10 days    ED Course as of 11/27/24 1135   Wed Nov 27, 2024   1101 CBC is normal  BMP is normal [EC]   1101 CT head  IMPRESSION:  No acute intracranial pathologic findings are identified.  Left parietal scalp laceration.  Remote bilateral lacunar infarcts. No change in the appearance of the  intracranial structures compared to 03/04/2024.   [EC]   1102 CT C-spine  IMPRESSION:  No evidence for a fracture on this exam. Cervical spondylosis as  described. Thyromegaly on the right with the associated 2 cm mass.  Nonemergency thyroid ultrasound is suggested for further assessment.  Atherosclerotic calcific plaque at the carotid bifurcations. There is  no interval change when compared to the previous examination.   [EC]      ED Course User Index  [EC] Dionicio Chawla DO         Diagnoses as of 11/27/24 1135   Head injury, initial encounter   Accidental fall, initial encounter   Vertigo      Counseling:   The emergency provider has spoken with the patient and discussed today’s results, in addition to providing specific details for the plan of care and counseling regarding the diagnosis and prognosis.  Questions are answered at this time and they are agreeable with the plan.      --------------------------------- IMPRESSION AND DISPOSITION ---------------------------------        IMPRESSION  No diagnosis found.    DISPOSITION  Disposition: Discharge to home  Patient condition is fair      Billing Provider Critical Care Time: 0 minutes     Dionicio Chawla DO  11/27/24 [id number]

## 2024-12-03 ENCOUNTER — APPOINTMENT (OUTPATIENT)
Dept: RHEUMATOLOGY | Facility: CLINIC | Age: 79
End: 2024-12-03
Payer: MEDICARE

## 2024-12-06 ENCOUNTER — APPOINTMENT (OUTPATIENT)
Dept: PRIMARY CARE | Facility: CLINIC | Age: 79
End: 2024-12-06
Payer: MEDICARE

## 2024-12-06 VITALS
SYSTOLIC BLOOD PRESSURE: 148 MMHG | DIASTOLIC BLOOD PRESSURE: 84 MMHG | WEIGHT: 212 LBS | BODY MASS INDEX: 40.06 KG/M2 | TEMPERATURE: 97.3 F | HEART RATE: 107 BPM | OXYGEN SATURATION: 96 %

## 2024-12-06 DIAGNOSIS — S01.01XD LACERATION OF OCCIPITAL REGION OF SCALP, SUBSEQUENT ENCOUNTER: Primary | ICD-10-CM

## 2024-12-06 DIAGNOSIS — R42 DIZZY: ICD-10-CM

## 2024-12-06 PROCEDURE — 1157F ADVNC CARE PLAN IN RCRD: CPT | Performed by: FAMILY MEDICINE

## 2024-12-06 PROCEDURE — 3079F DIAST BP 80-89 MM HG: CPT | Performed by: FAMILY MEDICINE

## 2024-12-06 PROCEDURE — 3077F SYST BP >= 140 MM HG: CPT | Performed by: FAMILY MEDICINE

## 2024-12-06 PROCEDURE — 1159F MED LIST DOCD IN RCRD: CPT | Performed by: FAMILY MEDICINE

## 2024-12-06 PROCEDURE — 99213 OFFICE O/P EST LOW 20 MIN: CPT | Performed by: FAMILY MEDICINE

## 2024-12-06 RX ORDER — NYSTATIN 100000 U/G
CREAM TOPICAL DAILY
COMMUNITY
Start: 2024-09-28

## 2024-12-06 RX ORDER — MECLIZINE HYDROCHLORIDE 25 MG/1
25 TABLET ORAL 3 TIMES DAILY PRN
Qty: 30 TABLET | Refills: 11 | Status: SHIPPED | OUTPATIENT
Start: 2024-12-06 | End: 2025-12-06

## 2024-12-09 PROBLEM — S01.01XA LACERATION OF OCCIPITAL REGION OF SCALP: Status: ACTIVE | Noted: 2024-12-09

## 2024-12-09 PROBLEM — R42 DIZZY: Status: ACTIVE | Noted: 2024-12-09

## 2024-12-09 NOTE — PROGRESS NOTES
Subjective   Patient ID: Mariajose Haq is a 79 y.o. female who presents for Fall (DIZZINESS- 11/27/24 Staples removed without complication.  No drainage/redness/swelling noted.).    HPI SEE ABOVE     Review of SystemsSINCE THE  VISIT NO INTERVAL HISTORICAL CHANGES IN ANY OF THE 12 SYSTEMS REVIEWED OTHER THAN OCCASIONAL DIZZINESS AND HEALED WOUND     Objective   /84   Pulse 107   Temp 36.3 °C (97.3 °F)   Wt 96.2 kg (212 lb)   SpO2 96%   BMI 40.06 kg/m²     Physical ExamSINCE RECENT VISIT NO INTERVAL PHYSICAL CHANGES IN ANY OF THE 12 SYSTEMS REVIEWED OTHER THAN THE SCALP WOUND IS WELL HEALED AND NO EVIDENCE OF INFECTION     Assessment/Plan   Problem List Items Addressed This Visit             ICD-10-CM    Dizzy R42    Relevant Medications    meclizine (Antivert) 25 mg tablet    Laceration of occipital region of scalp - Primary S01.01XA

## 2024-12-10 ENCOUNTER — APPOINTMENT (OUTPATIENT)
Dept: PODIATRY | Facility: CLINIC | Age: 79
End: 2024-12-10
Payer: MEDICARE

## 2024-12-10 DIAGNOSIS — M79.671 PAIN IN BOTH FEET: ICD-10-CM

## 2024-12-10 DIAGNOSIS — B35.1 ONYCHOMYCOSIS: ICD-10-CM

## 2024-12-10 DIAGNOSIS — R26.2 DIFFICULTY WALKING: Primary | ICD-10-CM

## 2024-12-10 DIAGNOSIS — M79.672 PAIN IN BOTH FEET: ICD-10-CM

## 2024-12-10 PROCEDURE — G2211 COMPLEX E/M VISIT ADD ON: HCPCS | Performed by: PODIATRIST

## 2024-12-10 PROCEDURE — 99202 OFFICE O/P NEW SF 15 MIN: CPT | Performed by: PODIATRIST

## 2024-12-10 NOTE — PROGRESS NOTES
This is a 79 y.o. female new patient for foot pain    History of Present Illness:   Patient states they are here for foot exam  Denies NTB to feet  Unable to safely trim nails on own  Saw Dr. Perry in the past    Past Medical History  Past Medical History:   Diagnosis Date    Ataxia, unspecified     Ataxia    Other conditions influencing health status     Cognitive Functions Current Level Impaired Mild    Other conditions influencing health status     Screening for malignant neoplasms, colon    Other specified anxiety disorders     Depression with anxiety    Other specified anxiety disorders     Depression with anxiety    Pain in unspecified joint     Multiple joint pain    Personal history of other diseases of the circulatory system 09/27/2018    History of hypertension    Personal history of other diseases of the nervous system and sense organs     History of sleep apnea    Personal history of other specified conditions     History of fatigue    Umbilical hernia without obstruction or gangrene     Umbilical hernia    Unspecified cataract     Cataract of both eyes    Unspecified convulsions (Multi)     Seizure    Unspecified convulsions (Multi)     Convulsive disorder       Medications and Allergies have been reviewed.    Review Of Systems:  GENERAL: No weight loss, malaise or fevers.  HEENT: Negative for frequent or significant headaches,   RESPIRATORY: Negative for cough, wheezing or shortness of breath.  CARDIOVASCULAR: Negative for chest pain, leg swelling or palpitations.    Examination of Both Lower Extremities:   Objective:   Vasc: DP and PT pulses are palpable bilateral.  CFT is less than 3 seconds bilateral.  Skin temperature is warm to cool proximal to distal bilateral.      Neuro: Vibratory, light touch and proprioception are intact bilateral.      Derm: Nails 1-5 bilateral are intact, thick and discolored    Ortho: Muscle strength is 5/5 for all pedal groups tested.      1. Difficulty walking        2.  Pain in both feet        3. Onychomycosis            Patient exam and eval  Nails bl Debrided  Keep trim and filed down  Fu prn  Patient was in agreement to this plan. All questions answered.      Julianna Nelson DPM  [phone]  Option 2  Fax: [phone]

## 2024-12-13 ENCOUNTER — APPOINTMENT (OUTPATIENT)
Dept: OBSTETRICS AND GYNECOLOGY | Facility: CLINIC | Age: 79
End: 2024-12-13
Payer: MEDICARE

## 2025-02-06 ENCOUNTER — APPOINTMENT (OUTPATIENT)
Dept: RADIOLOGY | Facility: HOSPITAL | Age: 80
End: 2025-02-06
Payer: MEDICARE

## 2025-02-06 ENCOUNTER — HOSPITAL ENCOUNTER (EMERGENCY)
Facility: HOSPITAL | Age: 80
Discharge: HOME | End: 2025-02-06
Payer: MEDICARE

## 2025-02-06 ENCOUNTER — APPOINTMENT (OUTPATIENT)
Dept: CARDIOLOGY | Facility: HOSPITAL | Age: 80
End: 2025-02-06
Payer: MEDICARE

## 2025-02-06 VITALS
SYSTOLIC BLOOD PRESSURE: 139 MMHG | RESPIRATION RATE: 16 BRPM | HEART RATE: 63 BPM | BODY MASS INDEX: 40.02 KG/M2 | HEIGHT: 61 IN | TEMPERATURE: 98.1 F | OXYGEN SATURATION: 97 % | WEIGHT: 212 LBS | DIASTOLIC BLOOD PRESSURE: 66 MMHG

## 2025-02-06 DIAGNOSIS — R42 DIZZINESS: ICD-10-CM

## 2025-02-06 DIAGNOSIS — M54.9 CHRONIC LEFT-SIDED BACK PAIN, UNSPECIFIED BACK LOCATION: ICD-10-CM
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
## 2025-02-06 DIAGNOSIS — R07.81 RIB PAIN ON RIGHT SIDE: ICD-10-CM

## 2025-02-06 DIAGNOSIS — N39.0 URINARY TRACT INFECTION IN FEMALE: ICD-10-CM

## 2025-02-06 DIAGNOSIS — G89.29 CHRONIC LEFT-SIDED BACK PAIN, UNSPECIFIED BACK LOCATION: ICD-10-CM

## 2025-02-06 LAB
ALBUMIN SERPL BCP-MCNC: 3.4 G/DL (ref 3.4–5)
ALP SERPL-CCNC: 114 U/L (ref 33–136)
ALT SERPL W P-5'-P-CCNC: 17 U/L (ref 7–45)
ANION GAP SERPL CALC-SCNC: 9 MMOL/L (ref 10–20)
APPEARANCE UR: CLEAR
AST SERPL W P-5'-P-CCNC: 31 U/L (ref 9–39)
BACTERIA #/AREA URNS AUTO: ABNORMAL /HPF
BASOPHILS # BLD AUTO: 0.05 X10*3/UL (ref 0–0.1)
BASOPHILS NFR BLD AUTO: 0.7 %
BILIRUB SERPL-MCNC: 0.2 MG/DL (ref 0–1.2)
BILIRUB UR STRIP.AUTO-MCNC: NEGATIVE MG/DL
BNP SERPL-MCNC: 55 PG/ML (ref 0–99)
BUN SERPL-MCNC: 17 MG/DL (ref 6–23)
CALCIUM SERPL-MCNC: 8.3 MG/DL (ref 8.6–10.3)
CARDIAC TROPONIN I PNL SERPL HS: 6 NG/L (ref 0–13)
CARDIAC TROPONIN I PNL SERPL HS: 9 NG/L (ref 0–13)
CHLORIDE SERPL-SCNC: 98 MMOL/L (ref 98–107)
CO2 SERPL-SCNC: 27 MMOL/L (ref 21–32)
COLOR UR: ABNORMAL
CREAT SERPL-MCNC: 0.8 MG/DL (ref 0.5–1.05)
EGFRCR SERPLBLD CKD-EPI 2021: 76 ML/MIN/1.73M*2
EOSINOPHIL # BLD AUTO: 0.08 X10*3/UL (ref 0–0.4)
EOSINOPHIL NFR BLD AUTO: 1.1 %
ERYTHROCYTE [DISTWIDTH] IN BLOOD BY AUTOMATED COUNT: 13.8 % (ref 11.5–14.5)
FLUAV RNA RESP QL NAA+PROBE: NOT DETECTED
FLUBV RNA RESP QL NAA+PROBE: NOT DETECTED
GLUCOSE SERPL-MCNC: 93 MG/DL (ref 74–99)
GLUCOSE UR STRIP.AUTO-MCNC: NORMAL MG/DL
HCT VFR BLD AUTO: 33.6 % (ref 36–46)
HGB BLD-MCNC: 11.5 G/DL (ref 12–16)
IMM GRANULOCYTES # BLD AUTO: 0.01 X10*3/UL (ref 0–0.5)
IMM GRANULOCYTES NFR BLD AUTO: 0.1 % (ref 0–0.9)
KETONES UR STRIP.AUTO-MCNC: NEGATIVE MG/DL
LEUKOCYTE ESTERASE UR QL STRIP.AUTO: ABNORMAL
LYMPHOCYTES # BLD AUTO: 1.56 X10*3/UL (ref 0.8–3)
LYMPHOCYTES NFR BLD AUTO: 21.8 %
MCH RBC QN AUTO: 32.9 PG (ref 26–34)
MCHC RBC AUTO-ENTMCNC: 34.2 G/DL (ref 32–36)
MCV RBC AUTO: 96 FL (ref 80–100)
MONOCYTES # BLD AUTO: 0.83 X10*3/UL (ref 0.05–0.8)
MONOCYTES NFR BLD AUTO: 11.6 %
MUCOUS THREADS #/AREA URNS AUTO: ABNORMAL /LPF
NEUTROPHILS # BLD AUTO: 4.64 X10*3/UL (ref 1.6–5.5)
NEUTROPHILS NFR BLD AUTO: 64.7 %
NITRITE UR QL STRIP.AUTO: ABNORMAL
NRBC BLD-RTO: 0 /100 WBCS (ref 0–0)
PH UR STRIP.AUTO: 6 [PH]
PLATELET # BLD AUTO: 165 X10*3/UL (ref 150–450)
POTASSIUM SERPL-SCNC: 4.3 MMOL/L (ref 3.5–5.3)
PROT SERPL-MCNC: 5.6 G/DL (ref 6.4–8.2)
PROT UR STRIP.AUTO-MCNC: NEGATIVE MG/DL
RBC # BLD AUTO: 3.5 X10*6/UL (ref 4–5.2)
RBC # UR STRIP.AUTO: NEGATIVE MG/DL
RBC #/AREA URNS AUTO: ABNORMAL /HPF
SARS-COV-2 RNA RESP QL NAA+PROBE: NOT DETECTED
SODIUM SERPL-SCNC: 130 MMOL/L (ref 136–145)
SP GR UR STRIP.AUTO: 1.02
SQUAMOUS #/AREA URNS AUTO: ABNORMAL /HPF
UROBILINOGEN UR STRIP.AUTO-MCNC: NORMAL MG/DL
WBC # BLD AUTO: 7.2 X10*3/UL (ref 4.4–11.3)
WBC #/AREA URNS AUTO: ABNORMAL /HPF

## 2025-02-06 PROCEDURE — 96375 TX/PRO/DX INJ NEW DRUG ADDON: CPT

## 2025-02-06 PROCEDURE — 71250 CT THORAX DX C-: CPT | Performed by: RADIOLOGY

## 2025-02-06 PROCEDURE — 70450 CT HEAD/BRAIN W/O DYE: CPT

## 2025-02-06 PROCEDURE — 72128 CT CHEST SPINE W/O DYE: CPT | Performed by: RADIOLOGY

## 2025-02-06 PROCEDURE — 72125 CT NECK SPINE W/O DYE: CPT

## 2025-02-06 PROCEDURE — 93005 ELECTROCARDIOGRAM TRACING: CPT

## 2025-02-06 PROCEDURE — 87636 SARSCOV2 & INF A&B AMP PRB: CPT

## 2025-02-06 PROCEDURE — 96361 HYDRATE IV INFUSION ADD-ON: CPT

## 2025-02-06 PROCEDURE — 81001 URINALYSIS AUTO W/SCOPE: CPT

## 2025-02-06 PROCEDURE — 2500000002 HC RX 250 W HCPCS SELF ADMINISTERED DRUGS (ALT 637 FOR MEDICARE OP, ALT 636 FOR OP/ED)

## 2025-02-06 PROCEDURE — 72125 CT NECK SPINE W/O DYE: CPT | Performed by: RADIOLOGY

## 2025-02-06 PROCEDURE — 83880 ASSAY OF NATRIURETIC PEPTIDE: CPT

## 2025-02-06 PROCEDURE — 72128 CT CHEST SPINE W/O DYE: CPT

## 2025-02-06 PROCEDURE — 84484 ASSAY OF TROPONIN QUANT: CPT

## 2025-02-06 PROCEDURE — 2500000004 HC RX 250 GENERAL PHARMACY W/ HCPCS (ALT 636 FOR OP/ED)

## 2025-02-06 PROCEDURE — 70450 CT HEAD/BRAIN W/O DYE: CPT | Performed by: RADIOLOGY

## 2025-02-06 PROCEDURE — 87186 SC STD MICRODIL/AGAR DIL: CPT | Mod: GENLAB

## 2025-02-06 PROCEDURE — 71250 CT THORAX DX C-: CPT

## 2025-02-06 PROCEDURE — 80053 COMPREHEN METABOLIC PANEL: CPT

## 2025-02-06 PROCEDURE — 85025 COMPLETE CBC W/AUTO DIFF WBC: CPT

## 2025-02-06 PROCEDURE — 2500000001 HC RX 250 WO HCPCS SELF ADMINISTERED DRUGS (ALT 637 FOR MEDICARE OP)

## 2025-02-06 PROCEDURE — 36415 COLL VENOUS BLD VENIPUNCTURE: CPT

## 2025-02-06 PROCEDURE — 87086 URINE CULTURE/COLONY COUNT: CPT | Mod: GENLAB

## 2025-02-06 PROCEDURE — 96374 THER/PROPH/DIAG INJ IV PUSH: CPT

## 2025-02-06 PROCEDURE — 99285 EMERGENCY DEPT VISIT HI MDM: CPT | Mod: 25

## 2025-02-06 RX ORDER — CEPHALEXIN 250 MG/1
500 CAPSULE ORAL ONCE
Status: COMPLETED | OUTPATIENT
Start: 2025-02-06 | End: 2025-02-06

## 2025-02-06 RX ORDER — KETOROLAC TROMETHAMINE 15 MG/ML
15 INJECTION, SOLUTION INTRAMUSCULAR; INTRAVENOUS ONCE
Status: COMPLETED | OUTPATIENT
Start: 2025-02-06 | End: 2025-02-06

## 2025-02-06 RX ORDER — CEPHALEXIN 500 MG/1
500 CAPSULE ORAL 2 TIMES DAILY
Qty: 14 CAPSULE | Refills: 0 | Status: SHIPPED | OUTPATIENT
Start: 2025-02-06 | End: 2025-02-13

## 2025-02-06 RX ORDER — LIDOCAINE 560 MG/1
1 PATCH PERCUTANEOUS; TOPICAL; TRANSDERMAL DAILY
Qty: 15 PATCH | Refills: 0 | Status: SHIPPED | OUTPATIENT
Start: 2025-02-06

## 2025-02-06 RX ORDER — LIDOCAINE 50 MG/G
1 PATCH TOPICAL DAILY
Qty: 7 PATCH | Refills: 0 | Status: SHIPPED | OUTPATIENT
Start: 2025-02-06

## 2025-02-06 RX ORDER — METHOCARBAMOL 500 MG/1
500 TABLET, FILM COATED ORAL 3 TIMES DAILY
Qty: 21 TABLET | Refills: 0 | Status: SHIPPED | OUTPATIENT
Start: 2025-02-06 | End: 2025-02-13

## 2025-02-06 RX ORDER — ONDANSETRON HYDROCHLORIDE 2 MG/ML
4 INJECTION, SOLUTION INTRAVENOUS ONCE
Status: COMPLETED | OUTPATIENT
Start: 2025-02-06 | End: 2025-02-06

## 2025-02-06 RX ORDER — MECLIZINE HCL 12.5 MG 12.5 MG/1
25 TABLET ORAL ONCE
Status: COMPLETED | OUTPATIENT
Start: 2025-02-06 | End: 2025-02-06

## 2025-02-06 RX ADMIN — SODIUM CHLORIDE 1000 ML: 9 INJECTION, SOLUTION INTRAVENOUS at 18:44

## 2025-02-06 RX ADMIN — CEPHALEXIN 500 MG: 250 CAPSULE ORAL at 21:12

## 2025-02-06 RX ADMIN — ONDANSETRON 4 MG: 2 INJECTION INTRAMUSCULAR; INTRAVENOUS at 18:41

## 2025-02-06 RX ADMIN — KETOROLAC TROMETHAMINE 15 MG: 15 INJECTION, SOLUTION INTRAMUSCULAR; INTRAVENOUS at 21:02

## 2025-02-06 RX ADMIN — MECLIZINE HYDROCHLORIDE 25 MG: 12.5 TABLET ORAL at 18:41

## 2025-02-06 ASSESSMENT — PAIN DESCRIPTION - ONSET: ONSET: SUDDEN

## 2025-02-06 ASSESSMENT — PAIN SCALES - GENERAL: PAINLEVEL_OUTOF10: 5 - MODERATE PAIN

## 2025-02-06 ASSESSMENT — PAIN DESCRIPTION - PROGRESSION: CLINICAL_PROGRESSION: GRADUALLY WORSENING

## 2025-02-06 ASSESSMENT — COLUMBIA-SUICIDE SEVERITY RATING SCALE - C-SSRS
1. IN THE PAST MONTH, HAVE YOU WISHED YOU WERE DEAD OR WISHED YOU COULD GO TO SLEEP AND NOT WAKE UP?: NO
2. HAVE YOU ACTUALLY HAD ANY THOUGHTS OF KILLING YOURSELF?: NO
6. HAVE YOU EVER DONE ANYTHING, STARTED TO DO ANYTHING, OR PREPARED TO DO ANYTHING TO END YOUR LIFE?: NO

## 2025-02-06 ASSESSMENT — PAIN DESCRIPTION - FREQUENCY: FREQUENCY: CONSTANT/CONTINUOUS

## 2025-02-06 ASSESSMENT — PAIN - FUNCTIONAL ASSESSMENT: PAIN_FUNCTIONAL_ASSESSMENT: 0-10

## 2025-02-06 ASSESSMENT — PAIN DESCRIPTION - PAIN TYPE: TYPE: ACUTE PAIN

## 2025-02-06 ASSESSMENT — PAIN DESCRIPTION - DESCRIPTORS: DESCRIPTORS: ACHING

## 2025-02-06 ASSESSMENT — PAIN DESCRIPTION - LOCATION: LOCATION: RIB CAGE

## 2025-02-06 ASSESSMENT — PAIN DESCRIPTION - ORIENTATION: ORIENTATION: RIGHT

## 2025-02-06 NOTE — ED PROVIDER NOTES
Limitations to history: None  Independent Historians: Family  External Records Reviewed: HIE, OARRS, outpatient notes, inpatient notes, paper charts if needed    History of Present Illness:  Patient is a 79-year-old female with a past medical history positive for hypertension, vertigo, dizziness, frequent falls, CHF, hyponatremia, generalized weakness who presents to ED chief complaint of dizziness, right-sided rib pain.  Patient reports that she fell 3 days ago due to dizziness.  Patient reports she is currently experiencing dizziness and is having difficulty walking due to her dizziness.  Patient reports she takes meclizine for her dizziness, and her dizziness is chronic.  Patient reports frequent falls, denies any recent head injury and/or use of blood thinners.  Patient is alert and oriented x 3 upon examination, in no acute distress.      Denies HA, C/P, SOB, ABD pain, Nausea, Vomiting, Diarrhea, Weakness, Dizziness, Fever, Chills.    PMFSH:   As per HPI, otherwise nurses notes reviewed in EMR    Physical Exam:  Appearance: Alert, oriented x3, supine on exam table with head elevated, cooperative, in no acute distress. Well nourished & well hydrated.      Skin: Intact, dry skin, no lesions, rash, petechiae or purpura.     Eyes: Lateral nystagmus present in both the left and right eye.  PERRLA, EOMs intact, Conjunctiva pink with no redness or exudates. No scleral icterus.     Ears: Hearing grossly intact.      Nose: Nares patent, no epistaxis.     Mouth: Dentition without concerning abnormalities. no obstruction of posterior pharynx.     Neck: Supple, without meningismus. Trachea at midline.     Pulmonary: Clear bilaterally with good chest wall excursion. No rales, rhonchi or wheezing. No accessory muscle use or stridor. Talking in full sentences.     Cardiac: Normal S1, S2 without murmur, rub, gallop or extrasystole.     Abdomen: Soft, nontender to light and deep palpation to all quadrants, normoactive bowel  sounds.  No palpable organomegaly.  No rebound or guarding.     Genitourinary: Physical exam deferred.     Musculoskeletal: Normal gait. Full range of motion to all extremities. Rest of the exam reveals no pain on palpation, instability, or deformity. Pulses full and equal. No cyanosis or clubbing. capillary refill <2 seconds to all examined digits.     Neurological:  Cranial nerves II through XII are grossly intact, normal sensation, no weakness, no focal findings identified.      Psychiatric: Appropriate mood and affect.    EKG interpreted by me shows   Ventricular rate of 63  bpm  WI interval   194             ms  QTc    406/415                        ms  No T wave elevation or depression    Labs Reviewed   CBC WITH AUTO DIFFERENTIAL - Abnormal       Result Value    WBC 7.2      nRBC 0.0      RBC 3.50 (*)     Hemoglobin 11.5 (*)     Hematocrit 33.6 (*)     MCV 96      MCH 32.9      MCHC 34.2      RDW 13.8      Platelets 165      Neutrophils % 64.7      Immature Granulocytes %, Automated 0.1      Lymphocytes % 21.8      Monocytes % 11.6      Eosinophils % 1.1      Basophils % 0.7      Neutrophils Absolute 4.64      Immature Granulocytes Absolute, Automated 0.01      Lymphocytes Absolute 1.56      Monocytes Absolute 0.83 (*)     Eosinophils Absolute 0.08      Basophils Absolute 0.05     COMPREHENSIVE METABOLIC PANEL - Abnormal    Glucose 93      Sodium 130 (*)     Potassium 4.3      Chloride 98      Bicarbonate 27      Anion Gap 9 (*)     Urea Nitrogen 17      Creatinine 0.80      eGFR 76      Calcium 8.3 (*)     Albumin 3.4      Alkaline Phosphatase 114      Total Protein 5.6 (*)     AST 31      Bilirubin, Total 0.2      ALT 17     SARS-COV-2 PCR - Normal    Coronavirus 2019, PCR Not Detected      Narrative:     This assay is an FDA-cleared, in vitro diagnostic nucleic acid amplification test for the qualitative detection and differentiation of SARS CoV-2 from nasopharyngeal specimens collected from individuals  with signs and symptoms of respiratory tract infections, and has been validated for use at [facility]. Negative results do not preclude COVID-19 infections and should not be used as the sole basis for diagnosis, treatment, or other management decisions. Testing for SARS CoV-2 is recommended only for patients who meet current clinical and/or epidemiological criteria defined by federal, state, or local public health directives.   INFLUENZA A AND B PCR - Normal    Flu A Result Not Detected      Flu B Result Not Detected      Narrative:     This assay is an in vitro diagnostic multiplex nucleic acid amplification test for the detection and discrimination of Influenza A & B from nasopharyngeal specimens, and has been validated for use at [facility]. Negative results do not preclude Influenza A/B infections, and should not be used as the sole basis for diagnosis, treatment, or other management decisions. If Influenza A/B and RSV PCR results are negative, testing for Parainfluenza virus, Adenovirus and Metapneumovirus is routinely performed for [facility] pediatric oncology and intensive care inpatients, and is available on other patients by placing an add-on request.   SERIAL TROPONIN-INITIAL - Normal    Troponin I, High Sensitivity 9      Narrative:     Less than 99th percentile of normal range cutoff-  Female and children under 18 years old <14 ng/L; Male <21 ng/L: Negative  Repeat testing should be performed if clinically indicated.     Female and children under 18 years old 14-50 ng/L; Male 21-50 ng/L:  Consistent with possible cardiac damage and possible increased clinical   risk. Serial measurements may help to assess extent of myocardial damage.     >50 ng/L: Consistent with cardiac damage, increased clinical risk and  myocardial infarction. Serial measurements may help assess extent of   myocardial damage.      NOTE: Children less than 1 year old may have higher baseline  troponin   levels and results should be interpreted in conjunction with the overall   clinical context.     NOTE: Troponin I testing is performed using a different   testing methodology at [facility] than at other   [facility]. Direct result comparisons should only   be made within the same method.   B-TYPE NATRIURETIC PEPTIDE - Normal    BNP 55      Narrative:        <100 pg/mL - Heart failure unlikely  100-299 pg/mL - Intermediate probability of acute heart                  failure exacerbation. Correlate with clinical                  context and patient history.    >=300 pg/mL - Heart Failure likely. Correlate with clinical                  context and patient history.    BNP testing is performed using different testing methodology at [facility] than at other [facility] hospitals. Direct result comparisons should only be made within the same method.      SERIAL TROPONIN, 1 HOUR - Normal    Troponin I, High Sensitivity 6      Narrative:     Less than 99th percentile of normal range cutoff-  Female and children under 18 years old <14 ng/L; Male <21 ng/L: Negative  Repeat testing should be performed if clinically indicated.     Female and children under 18 years old 14-50 ng/L; Male 21-50 ng/L:  Consistent with possible cardiac damage and possible increased clinical   risk. Serial measurements may help to assess extent of myocardial damage.     >50 ng/L: Consistent with cardiac damage, increased clinical risk and  myocardial infarction. Serial measurements may help assess extent of   myocardial damage.      NOTE: Children less than 1 year old may have higher baseline troponin   levels and results should be interpreted in conjunction with the overall   clinical context.     NOTE: Troponin I testing is performed using a different   testing methodology at [facility] than at other   [facility]. Direct result comparisons should only   be made within the same method.    TROPONIN SERIES- (INITIAL, 1 HR)    Narrative:     The following orders were created for panel order Troponin I Series, High Sensitivity (0, 1 HR).  Procedure                               Abnormality         Status                     ---------                               -----------         ------                     Troponin I, High Sensiti...[[id number]]  Normal              Final result               Troponin, High Sensitivi...[[id number]]  Normal              Final result                 Please view results for these tests on the individual orders.   URINALYSIS WITH REFLEX CULTURE AND MICROSCOPIC    Narrative:     The following orders were created for panel order Urinalysis with Reflex Culture and Microscopic.  Procedure                               Abnormality         Status                     ---------                               -----------         ------                     Urinalysis with Reflex C...[[id number]]                                                 Extra Urine Gray Tube[[id number]]                                                         Please view results for these tests on the individual orders.   URINALYSIS WITH REFLEX CULTURE AND MICROSCOPIC   EXTRA URINE GRAY TUBE      CT chest wo IV contrast   Final Result   1.  Senescent changes. Demineralization limits sensitivity.   2. Robust coronary calcification. Aortic arch calcification.   3. Hepatic steatosis.   4. Enlarged nodular right thyroid gland.   5. No localizing infiltrate. Bronchial thickening. Remote rib   fractures. No definite acute fracture.   6. Multilevel degenerative disc disease of the thoracic spine   superimposed on demineralization and degenerative changes. If there   is focal pain, MRI would be more sensitive to evaluate for marrow   edema        7. Gallbladder sludge or small stones noted layering dependently        Signed by: Lenin Vergara 2/6/2025 7:00 PM   Dictation workstation:   [id number]      CT thoracic  spine wo IV contrast   Final Result   1.  Senescent changes. Demineralization limits sensitivity.   2. Robust coronary calcification. Aortic arch calcification.   3. Hepatic steatosis.   4. Enlarged nodular right thyroid gland.   5. No localizing infiltrate. Bronchial thickening. Remote rib   fractures. No definite acute fracture.   6. Multilevel degenerative disc disease of the thoracic spine   superimposed on demineralization and degenerative changes. If there   is focal pain, MRI would be more sensitive to evaluate for marrow   edema        7. Gallbladder sludge or small stones noted layering dependently        Signed by: Lenin Vergara 2/6/2025 7:00 PM   Dictation workstation:   [id number]      CT head wo IV contrast   Final Result   CT HEAD:   No acute intracranial abnormality or calvarial fracture.   Stable senescent changes and postsurgical changes        CT CERVICAL SPINE:   No acute fracture or traumatic malalignment of the cervical spine.   Demineralization and degenerative changes.        Persistent right enlarged thyroid gland again seen. Correlation with   any known history and prior thyroid ultrasound if performed        Signed by: Lenin Vergara 2/6/2025 6:47 PM   Dictation workstation:   [id number]      CT cervical spine wo IV contrast   Final Result   CT HEAD:   No acute intracranial abnormality or calvarial fracture.   Stable senescent changes and postsurgical changes        CT CERVICAL SPINE:   No acute fracture or traumatic malalignment of the cervical spine.   Demineralization and degenerative changes.        Persistent right enlarged thyroid gland again seen. Correlation with   any known history and prior thyroid ultrasound if performed        Signed by: Lenin Vergara 2/6/2025 6:47 PM   Dictation workstation:   [id number]                   Repeat Evaluation below    Summary:  Medical Decision Making:   Patient presented as described in HPI. Patient case including ROS, PE, and  treatment and plan discussed with ED attending if attached as cosigner. Due to patients presentation orders completed include as documented.  Patient evaluated for complaints of a recent fall due to her chronic dizziness.  Patient denied any head injury, loss of consciousness or use of blood thinners.  Patient is found to be afebrile, nontachycardic, nonhypoxic.  Patient had a NIH of 0.  Patient reports she uses a walker at home.  Patient is ambulatory in ED with walker.  Serum CMP revealed a mild hyponatremia 130, viral swabs negative.  Delta troponin negative.  BNP 55, serum CBC revealed no evidence of leukocytosis present.  CT imaging of head revealed no acute intracranial abnormalities, CT cervical spine revealed no acute fracture or traumatic malalignment.  CT thoracic spine reveals no acute findings.  Remote rib fractures, without acute fractures identified.  Patient will be discharged home, advised to follow-up with primary care provider within 1 week for further evaluation.  Patient reports she feels comfortable going home at this point, is no longer dizzy.  Patient was given IV normal saline fluids, meclizine, Zofran.  Patient reports he is feeling much better after these medications.  Patient was advised to follow up with PCP or recommended provider in 2-3 days for another evaluation and exam. I advised patient/guardian to return or go to closest emergency room immediately if symptoms change, get worse, new symptoms develop prior to follow up. If there is no improvement in symptoms in the next 24 hours they are advised to return for further evaluation and exam. I also explained the plan and treatment course. Patient/guardian is in agreement with plan, treatment course, and follow up and states verbally that they will comply.    Tests/Medications/Escalations of Care considered but not given:    Patient care discussed with: N/A  Social Determinants affecting care: N/A    Final diagnosis and disposition as  documented in impression    Homegoing. I discussed the differential; results and discharge plan with the patient and/or family/friend/caregiver if present.  I emphasized the importance of follow-up with the physician I referred them to in the timeframe recommended.  I explained reasons for the patient to return to the Emergency Department. They agreed that if they feel their condition is worsening or if they have any other concern they should call 911 immediately for further assistance. I gave the patient an opportunity to ask all questions they had and answered all of them accordingly. They understand return precautions and discharge instructions. The patient and/or family/friend/caregiver expressed understanding verbally and that they would comply.       Disposition:  Discharge         This note has been transcribed using voice recognition and may contain grammatical errors, misplaced words, incorrect words, incorrect phrases or other errors.     ALENA Ramesh-DUNG  02/06/25 2041       ALENA Ramesh-DUNG  02/06/25 2042

## 2025-02-06 NOTE — ED TRIAGE NOTES
Patient states she fell a few days ago because she was dizzy and she hit her ribs on the right side. She denies hitting her head.

## 2025-02-07 LAB — HOLD SPECIMEN: NORMAL

## 2025-02-09 LAB — BACTERIA UR CULT: ABNORMAL

## 2025-02-11 LAB
ATRIAL RATE: 63 BPM
P AXIS: 68 DEGREES
P OFFSET: 189 MS
P ONSET: 120 MS
PR INTERVAL: 194 MS
Q ONSET: 217 MS
QRS COUNT: 10 BEATS
QRS DURATION: 106 MS
QT INTERVAL: 406 MS
QTC CALCULATION(BAZETT): 415 MS
QTC FREDERICIA: 412 MS
R AXIS: -3 DEGREES
T AXIS: 58 DEGREES
T OFFSET: 420 MS
VENTRICULAR RATE: 63 BPM

## 2025-02-14 DIAGNOSIS — M06.9 RHEUMATOID ARTHRITIS, INVOLVING UNSPECIFIED SITE, UNSPECIFIED WHETHER RHEUMATOID FACTOR PRESENT (MULTI): ICD-10-CM

## 2025-02-14 DIAGNOSIS — R56.9 SEIZURE (MULTI): ICD-10-CM

## 2025-02-14 RX ORDER — OXCARBAZEPINE 300 MG/1
1050 TABLET, FILM COATED ORAL 2 TIMES DAILY
Qty: 630 TABLET | Refills: 0 | Status: SHIPPED | OUTPATIENT
Start: 2025-02-14 | End: 2025-05-15

## 2025-02-14 RX ORDER — FOLIC ACID 1 MG/1
1 TABLET ORAL DAILY
Qty: 90 TABLET | Refills: 3 | Status: SHIPPED | OUTPATIENT
Start: 2025-02-14 | End: 2026-02-14

## 2025-02-26 ENCOUNTER — APPOINTMENT (OUTPATIENT)
Dept: PRIMARY CARE | Facility: CLINIC | Age: 80
End: 2025-02-26
Payer: MEDICARE

## 2025-02-26 VITALS
HEART RATE: 76 BPM | DIASTOLIC BLOOD PRESSURE: 80 MMHG | BODY MASS INDEX: 40.4 KG/M2 | WEIGHT: 213.8 LBS | OXYGEN SATURATION: 98 % | SYSTOLIC BLOOD PRESSURE: 150 MMHG | TEMPERATURE: 98.4 F

## 2025-02-26 DIAGNOSIS — R26.9 GAIT DISORDER: Primary | ICD-10-CM

## 2025-02-26 DIAGNOSIS — R42 DIZZY: ICD-10-CM

## 2025-02-26 PROCEDURE — 1036F TOBACCO NON-USER: CPT | Performed by: FAMILY MEDICINE

## 2025-02-26 PROCEDURE — 1159F MED LIST DOCD IN RCRD: CPT | Performed by: FAMILY MEDICINE

## 2025-02-26 PROCEDURE — 99214 OFFICE O/P EST MOD 30 MIN: CPT | Performed by: FAMILY MEDICINE

## 2025-02-26 PROCEDURE — 3079F DIAST BP 80-89 MM HG: CPT | Performed by: FAMILY MEDICINE

## 2025-02-26 PROCEDURE — 1157F ADVNC CARE PLAN IN RCRD: CPT | Performed by: FAMILY MEDICINE

## 2025-02-26 PROCEDURE — 3077F SYST BP >= 140 MM HG: CPT | Performed by: FAMILY MEDICINE

## 2025-02-26 RX ORDER — MECLIZINE HYDROCHLORIDE 25 MG/1
25 TABLET ORAL 3 TIMES DAILY PRN
Qty: 90 TABLET | Refills: 1 | Status: SHIPPED | OUTPATIENT
Start: 2025-02-26 | End: 2025-05-27

## 2025-02-26 ASSESSMENT — ENCOUNTER SYMPTOMS
NUMBNESS: 0
SORE THROAT: 0
SEIZURES: 1
UNEXPECTED WEIGHT CHANGE: 0
NERVOUS/ANXIOUS: 0
EYE DISCHARGE: 0
MYALGIAS: 0
FLANK PAIN: 0
EYE ITCHING: 0
CONSTIPATION: 0
DYSURIA: 0
ACTIVITY CHANGE: 0
DIARRHEA: 0
SLEEP DISTURBANCE: 0
NAUSEA: 0
HEMATURIA: 0
RHINORRHEA: 0
FEVER: 0
LIGHT-HEADEDNESS: 1
SINUS PRESSURE: 0
ARTHRALGIAS: 0
DYSPHORIC MOOD: 0
BLOOD IN STOOL: 0
HEADACHES: 0
APPETITE CHANGE: 0
PALPITATIONS: 0
JOINT SWELLING: 0
WEAKNESS: 0
DIZZINESS: 0
ABDOMINAL PAIN: 0
WHEEZING: 0
COUGH: 0
VOMITING: 0
SHORTNESS OF BREATH: 0

## 2025-02-26 NOTE — PROGRESS NOTES
Subjective   Patient ID: Mariajose Haq is a 79 y.o. female who presents for Follow-up (ER FUV FELL AND BROKE RIB).    HPI DIZZY AND FELL 11-24 AND LACERATED SCALP   DIZZY IN EARLY FEBRUARY AND BROKE RIBS   I SAW 12-24 AND TOOK OUT STITCHES   FURTHER REVIEW OF CHART SHE HAS SEIZURE AND A GAIT DISORDER/SEEN BY DR GOLDBERG AND WILL FOLLOW IN [place]   SHE IS ON MEDICATION     Review of Systems   Constitutional:  Negative for activity change, appetite change, fever and unexpected weight change.   HENT:  Negative for congestion, ear pain, postnasal drip, rhinorrhea, sinus pressure and sore throat.         SPEECH ISSUES/HALTING OR ALMOST STUTTERING ? HARD TO FOLLOW    Eyes:  Negative for discharge, itching and visual disturbance.   Respiratory:  Negative for cough, shortness of breath and wheezing.    Cardiovascular:  Negative for chest pain, palpitations and leg swelling.   Gastrointestinal:  Negative for abdominal pain, blood in stool, constipation, diarrhea, nausea and vomiting.   Endocrine: Negative for cold intolerance, heat intolerance and polyuria.   Genitourinary:  Negative for dysuria, flank pain and hematuria.   Musculoskeletal:  Negative for arthralgias, gait problem, joint swelling and myalgias.   Skin:  Negative for rash.   Allergic/Immunologic: Negative for environmental allergies and food allergies.   Neurological:  Positive for seizures, syncope and light-headedness. Negative for dizziness, weakness, numbness and headaches.   Psychiatric/Behavioral:  Negative for dysphoric mood and sleep disturbance. The patient is not nervous/anxious.        Objective   /80   Pulse 76   Temp 36.9 °C (98.4 °F)   Wt 97 kg (213 lb 12.8 oz)   SpO2 98%   BMI 40.40 kg/m²     Physical Exam  Vitals and nursing note reviewed.   Constitutional:       Appearance: Normal appearance. She is ill-appearing.   HENT:      Head: Normocephalic.   Cardiovascular:      Rate and Rhythm: Normal rate and regular rhythm.       Pulses: Normal pulses.      Heart sounds: Normal heart sounds. No murmur heard.     No friction rub. No gallop.      Comments: ELEVATED B/P   Pulmonary:      Effort: Pulmonary effort is normal. No respiratory distress.      Breath sounds: Normal breath sounds. No wheezing.   Abdominal:      General: There is no distension.      Tenderness: There is no abdominal tenderness.   Musculoskeletal:         General: No deformity. Normal range of motion.   Skin:     General: Skin is warm and dry.      Capillary Refill: Capillary refill takes less than 2 seconds.   Neurological:      General: No focal deficit present.      Mental Status: She is alert and oriented to person, place, and time.   Psychiatric:         Mood and Affect: Mood normal.       Assessment/Plan   Problem List Items Addressed This Visit             ICD-10-CM    Gait disorder - Primary R26.9    Dizzy R42    Relevant Medications    meclizine (Antivert) 25 mg tablet

## 2025-03-16 DIAGNOSIS — I10 ESSENTIAL (PRIMARY) HYPERTENSION: ICD-10-CM

## 2025-03-18 RX ORDER — ATORVASTATIN CALCIUM 40 MG/1
40 TABLET, FILM COATED ORAL DAILY
Qty: 90 TABLET | Refills: 3 | Status: SHIPPED | OUTPATIENT
Start: 2025-03-18 | End: 2026-03-18

## 2025-03-18 RX ORDER — AMLODIPINE BESYLATE 5 MG/1
5 TABLET ORAL EVERY 12 HOURS
Qty: 180 TABLET | Refills: 3 | Status: SHIPPED | OUTPATIENT
Start: 2025-03-18 | End: 2026-03-18

## 2025-03-25 ENCOUNTER — APPOINTMENT (OUTPATIENT)
Dept: PODIATRY | Facility: CLINIC | Age: 80
End: 2025-03-25
Payer: COMMERCIAL

## 2025-03-25 DIAGNOSIS — M79.672 PAIN IN BOTH FEET: Primary | ICD-10-CM

## 2025-03-25 DIAGNOSIS — B35.3 TINEA PEDIS OF BOTH FEET: ICD-10-CM

## 2025-03-25 DIAGNOSIS — B35.1 ONYCHOMYCOSIS: ICD-10-CM

## 2025-03-25 DIAGNOSIS — M79.671 PAIN IN BOTH FEET: Primary | ICD-10-CM

## 2025-03-25 PROCEDURE — 99213 OFFICE O/P EST LOW 20 MIN: CPT | Performed by: PODIATRIST

## 2025-03-25 RX ORDER — NYSTATIN 100000 U/G
CREAM TOPICAL DAILY
Qty: 30 G | Refills: 3 | Status: SHIPPED | OUTPATIENT
Start: 2025-03-25

## 2025-03-25 NOTE — PROGRESS NOTES
History of Present Illness:   Patient states they are here for foot exam  Denies NTB to feet  Unable to safely trim nails on own    Needs refil on nystatin  Saw Dr. Perry in the past    Past Medical History  Past Medical History:   Diagnosis Date    Ataxia, unspecified     Ataxia    Other conditions influencing health status     Cognitive Functions Current Level Impaired Mild    Other conditions influencing health status     Screening for malignant neoplasms, colon    Other specified anxiety disorders     Depression with anxiety    Other specified anxiety disorders     Depression with anxiety    Pain in unspecified joint     Multiple joint pain    Personal history of other diseases of the circulatory system 09/27/2018    History of hypertension    Personal history of other diseases of the nervous system and sense organs     History of sleep apnea    Personal history of other specified conditions     History of fatigue    Umbilical hernia without obstruction or gangrene     Umbilical hernia    Unspecified cataract     Cataract of both eyes    Unspecified convulsions (Multi)     Seizure    Unspecified convulsions (Multi)     Convulsive disorder       Medications and Allergies have been reviewed.    Review Of Systems:  GENERAL: No weight loss, malaise or fevers.  HEENT: Negative for frequent or significant headaches,   RESPIRATORY: Negative for cough, wheezing or shortness of breath.  CARDIOVASCULAR: Negative for chest pain, leg swelling or palpitations.    Examination of Both Lower Extremities:   Objective:   Vasc: DP and PT pulses are palpable bilateral.  CFT is less than 3 seconds bilateral.  Skin temperature is warm to cool proximal to distal bilateral.      Neuro: Vibratory, light touch and proprioception are intact bilateral.      Derm: Nails 1-5 bilateral are intact, thick and discolored. Mild plantar scaling skin    Ortho: Muscle strength is 5/5 for all pedal groups tested.      1. Difficulty walking        2.  Pain in both feet        3. Onychomycosis            Patient exam and eval  Nails bl Debrided  Keep trim and filed down  Called in refill of nystatin. Use as directed  Fu prn  Patient was in agreement to this plan. All questions answered.      Julianna Nelson DPM  [phone]  [facility] [address]  Fax: [phone]

## 2025-04-15 DIAGNOSIS — M06.9 RHEUMATOID ARTHRITIS, UNSPECIFIED: ICD-10-CM

## 2025-04-16 RX ORDER — HYDROXYCHLOROQUINE SULFATE 200 MG/1
TABLET, FILM COATED ORAL
Qty: 60 TABLET | Refills: 11 | Status: SHIPPED | OUTPATIENT
Start: 2025-04-16 | End: 2025-04-17 | Stop reason: ALTCHOICE

## 2025-04-17 ENCOUNTER — OFFICE VISIT (OUTPATIENT)
Dept: RHEUMATOLOGY | Facility: CLINIC | Age: 80
End: 2025-04-17
Payer: MEDICARE

## 2025-04-17 VITALS
WEIGHT: 213 LBS | OXYGEN SATURATION: 97 % | SYSTOLIC BLOOD PRESSURE: 148 MMHG | DIASTOLIC BLOOD PRESSURE: 76 MMHG | BODY MASS INDEX: 40.25 KG/M2 | HEART RATE: 66 BPM

## 2025-04-17 DIAGNOSIS — M06.9 RHEUMATOID ARTHRITIS, INVOLVING UNSPECIFIED SITE, UNSPECIFIED WHETHER RHEUMATOID FACTOR PRESENT (MULTI): Primary | ICD-10-CM

## 2025-04-17 DIAGNOSIS — Z79.899 ENCOUNTER FOR LONG-TERM CURRENT USE OF HIGH RISK MEDICATION: ICD-10-CM

## 2025-04-17 PROCEDURE — G2211 COMPLEX E/M VISIT ADD ON: HCPCS | Performed by: INTERNAL MEDICINE

## 2025-04-17 PROCEDURE — 3077F SYST BP >= 140 MM HG: CPT | Performed by: INTERNAL MEDICINE

## 2025-04-17 PROCEDURE — 3078F DIAST BP <80 MM HG: CPT | Performed by: INTERNAL MEDICINE

## 2025-04-17 PROCEDURE — 1036F TOBACCO NON-USER: CPT | Performed by: INTERNAL MEDICINE

## 2025-04-17 PROCEDURE — 1159F MED LIST DOCD IN RCRD: CPT | Performed by: INTERNAL MEDICINE

## 2025-04-17 PROCEDURE — 99213 OFFICE O/P EST LOW 20 MIN: CPT | Performed by: INTERNAL MEDICINE

## 2025-04-17 PROCEDURE — 1126F AMNT PAIN NOTED NONE PRSNT: CPT | Performed by: INTERNAL MEDICINE

## 2025-04-17 PROCEDURE — 1157F ADVNC CARE PLAN IN RCRD: CPT | Performed by: INTERNAL MEDICINE

## 2025-04-17 ASSESSMENT — ENCOUNTER SYMPTOMS
SEIZURES: 1
DIZZINESS: 1
SHORTNESS OF BREATH: 1
DEPRESSION: 0
FATIGUE: 1
OCCASIONAL FEELINGS OF UNSTEADINESS: 1
LOSS OF SENSATION IN FEET: 0
COUGH: 1

## 2025-04-17 ASSESSMENT — PATIENT HEALTH QUESTIONNAIRE - PHQ9
SUM OF ALL RESPONSES TO PHQ9 QUESTIONS 1 AND 2: 0
1. LITTLE INTEREST OR PLEASURE IN DOING THINGS: NOT AT ALL
2. FEELING DOWN, DEPRESSED OR HOPELESS: NOT AT ALL

## 2025-04-17 ASSESSMENT — PAIN SCALES - GENERAL: PAINLEVEL_OUTOF10: 0-NO PAIN

## 2025-04-17 NOTE — PROGRESS NOTES
Subjective   Patient ID: Mariajose Haq is a 79 y.o. female who presents for Follow-up.  HPI  Patient with history of rheumatoid arthritis and macular degeneration.    Patient was last seen in February 2024.    At that time we had her continue on hydroxychloroquine and methotrexate.  She did appear to be stable.    Unfortunately she has had Several emergency room visits for dizziness, back pain, falls. She broke a rib and also go a laceration on her scalp.  It's mostly the dizziness that makes her fall.  She lives with her autistic sone.  She will be seeing Dr. Stone since Dr. Pelaez has left.  She also has a history of seizures.  She has not had any seizures recently.      She doesn't have any energy anymore.  She had gone shopping with her son but didn't have enough energy.  She has friends that get her groceries.    Sometimes she feels short of breath.  Her fingers will stick 3,4 on the right    Review of Systems   Constitutional:  Positive for fatigue.   HENT:          Mucous   Respiratory:  Positive for cough and shortness of breath.    Cardiovascular:  Positive for leg swelling.   Musculoskeletal:         Per HPI   Neurological:  Positive for dizziness and seizures.       Objective   Physical Exam  GEN: NAD A&O x3 , appropriate affect, antalgic gait, rollator  HEENT:no enlarged glands or thyroid, glasses  LYMPH: no cervical lymphadenopathy  SKIN: no rashes  PULSES: +radials   MSK:  Bilateral knee replacements   Scoliosis and kyphosis of the thoracic lumbar spine  Ulnar deviation of her bilateral hands with enlargement in between her second and third MCP on the right. No current tenderness small flexor tendon in the fourth digit on the right  no swelling in wrists's   Trigger finger 3,4   Assessment/Plan     RA -  currently on methotrexate and Plaquenil. Appears stable.  Will have her do blood work     -has macular degeneration and will see them every 4-6 weeks.   She gets shots in her eyes.   Will stop  her plaquenil   - Have her continue with the methotrexate.  Reviewed her recent blood work and had normal CMP and CBC from February 2025.  Currently not having any drug toxicity for methotrexate        We will see her again in 6 months or as needed.        Aura Mckenzie MD 04/17/25 9:50 AM

## 2025-04-22 ENCOUNTER — APPOINTMENT (OUTPATIENT)
Dept: NEUROLOGY | Facility: CLINIC | Age: 80
End: 2025-04-22
Payer: COMMERCIAL

## 2025-04-22 VITALS
DIASTOLIC BLOOD PRESSURE: 72 MMHG | HEART RATE: 67 BPM | HEIGHT: 61 IN | SYSTOLIC BLOOD PRESSURE: 134 MMHG | BODY MASS INDEX: 40.25 KG/M2

## 2025-04-22 DIAGNOSIS — R26.9 GAIT DISTURBANCE: Primary | ICD-10-CM

## 2025-04-22 DIAGNOSIS — R56.9 SEIZURE (MULTI): ICD-10-CM

## 2025-04-22 PROCEDURE — 1159F MED LIST DOCD IN RCRD: CPT | Performed by: PSYCHIATRY & NEUROLOGY

## 2025-04-22 PROCEDURE — 99213 OFFICE O/P EST LOW 20 MIN: CPT | Performed by: PSYCHIATRY & NEUROLOGY

## 2025-04-22 PROCEDURE — 3078F DIAST BP <80 MM HG: CPT | Performed by: PSYCHIATRY & NEUROLOGY

## 2025-04-22 PROCEDURE — 3075F SYST BP GE 130 - 139MM HG: CPT | Performed by: PSYCHIATRY & NEUROLOGY

## 2025-04-22 PROCEDURE — 1157F ADVNC CARE PLAN IN RCRD: CPT | Performed by: PSYCHIATRY & NEUROLOGY

## 2025-04-22 PROCEDURE — 1036F TOBACCO NON-USER: CPT | Performed by: PSYCHIATRY & NEUROLOGY

## 2025-04-22 RX ORDER — OXCARBAZEPINE 300 MG/1
1050 TABLET, FILM COATED ORAL 2 TIMES DAILY
Qty: 630 TABLET | Refills: 3 | Status: SHIPPED | OUTPATIENT
Start: 2025-04-22 | End: 2026-04-22

## 2025-04-22 NOTE — PROGRESS NOTES
Subjective   Mariajose Haq is a 79 y.o.   female.  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 Pelaez 1/31/24, for seizures, gait disorder, joint pain.  She has morbid obesity, predominately wheelchair bound, has chronic dizziness-imbalance, history of falls, seen at [facility] ED on 2/6/25 with a recent fall, has chronic difficulty walking, diagnosed with a UTI- treated with Keflex.  Lab work was reviewed- NA-130-low.  CT head without contrast 2/6/25: atrophy, s/p left frontal craniotomy, chronic small vessel ischemic disease.  Today she reports sporadic dizziness- off-balance when ambulating.  She denies knowing about any seizures.    Objective   Neurological Exam  Alert and pleasant older woman.  Speech is pressured, somewhat impaired historian.  She follows commands.  Bilaterally horizontal end-gaze nystagmus  Stands up with some assistance, wide-based, unsteady, negative Romberg  Physical Exam  I personally reviewed laboratory, radiographic, and medical studies which were pertinent for nothing.    Assessment/Plan

## 2025-04-22 NOTE — PATIENT INSTRUCTIONS
It was good to see you today!  Continue taking the Trileptal as written.  Have the blood work.   Be very careful when on your feet and turning.  Follow up with Juliet Colin PA-C in one year for refilling your Trileptal.

## 2025-04-28 ENCOUNTER — APPOINTMENT (OUTPATIENT)
Dept: RADIOLOGY | Facility: HOSPITAL | Age: 80
End: 2025-04-28
Payer: MEDICARE

## 2025-04-28 ENCOUNTER — APPOINTMENT (OUTPATIENT)
Dept: CARDIOLOGY | Facility: HOSPITAL | Age: 80
End: 2025-04-28
Payer: MEDICARE

## 2025-04-28 ENCOUNTER — HOSPITAL ENCOUNTER (EMERGENCY)
Facility: HOSPITAL | Age: 80
Discharge: HOME | End: 2025-04-28
Attending: EMERGENCY MEDICINE
Payer: MEDICARE

## 2025-04-28 VITALS
OXYGEN SATURATION: 100 % | RESPIRATION RATE: 16 BRPM | HEART RATE: 63 BPM | SYSTOLIC BLOOD PRESSURE: 160 MMHG | WEIGHT: 214.51 LBS | BODY MASS INDEX: 39.47 KG/M2 | HEIGHT: 62 IN | DIASTOLIC BLOOD PRESSURE: 72 MMHG | TEMPERATURE: 97.1 F

## 2025-04-28 DIAGNOSIS — R42 DIZZINESS: Primary | ICD-10-CM

## 2025-04-28 LAB
ALBUMIN SERPL BCP-MCNC: 3.7 G/DL (ref 3.4–5)
ALP SERPL-CCNC: 129 U/L (ref 33–136)
ALT SERPL W P-5'-P-CCNC: 18 U/L (ref 7–45)
ANION GAP SERPL CALC-SCNC: 11 MMOL/L (ref 10–20)
AST SERPL W P-5'-P-CCNC: 34 U/L (ref 9–39)
ATRIAL RATE: 60 BPM
BASOPHILS # BLD AUTO: 0.03 X10*3/UL (ref 0–0.1)
BASOPHILS NFR BLD AUTO: 0.6 %
BILIRUB SERPL-MCNC: 0.3 MG/DL (ref 0–1.2)
BUN SERPL-MCNC: 21 MG/DL (ref 6–23)
CALCIUM SERPL-MCNC: 8.7 MG/DL (ref 8.6–10.3)
CARDIAC TROPONIN I PNL SERPL HS: 8 NG/L (ref 0–13)
CHLORIDE SERPL-SCNC: 101 MMOL/L (ref 98–107)
CO2 SERPL-SCNC: 29 MMOL/L (ref 21–32)
CREAT SERPL-MCNC: 0.85 MG/DL (ref 0.5–1.05)
EGFRCR SERPLBLD CKD-EPI 2021: 70 ML/MIN/1.73M*2
EOSINOPHIL # BLD AUTO: 0.14 X10*3/UL (ref 0–0.4)
EOSINOPHIL NFR BLD AUTO: 2.7 %
ERYTHROCYTE [DISTWIDTH] IN BLOOD BY AUTOMATED COUNT: 14 % (ref 11.5–14.5)
GLUCOSE SERPL-MCNC: 78 MG/DL (ref 74–99)
HCT VFR BLD AUTO: 35.2 % (ref 36–46)
HGB BLD-MCNC: 11.6 G/DL (ref 12–16)
IMM GRANULOCYTES # BLD AUTO: 0 X10*3/UL (ref 0–0.5)
IMM GRANULOCYTES NFR BLD AUTO: 0 % (ref 0–0.9)
LYMPHOCYTES # BLD AUTO: 1.31 X10*3/UL (ref 0.8–3)
LYMPHOCYTES NFR BLD AUTO: 25.2 %
MAGNESIUM SERPL-MCNC: 1.98 MG/DL (ref 1.6–2.4)
MCH RBC QN AUTO: 32.6 PG (ref 26–34)
MCHC RBC AUTO-ENTMCNC: 33 G/DL (ref 32–36)
MCV RBC AUTO: 99 FL (ref 80–100)
MONOCYTES # BLD AUTO: 0.85 X10*3/UL (ref 0.05–0.8)
MONOCYTES NFR BLD AUTO: 16.4 %
NEUTROPHILS # BLD AUTO: 2.86 X10*3/UL (ref 1.6–5.5)
NEUTROPHILS NFR BLD AUTO: 55.1 %
NRBC BLD-RTO: ABNORMAL /100{WBCS}
P AXIS: 44 DEGREES
P OFFSET: 158 MS
P ONSET: 127 MS
PLATELET # BLD AUTO: 171 X10*3/UL (ref 150–450)
POTASSIUM SERPL-SCNC: 4.6 MMOL/L (ref 3.5–5.3)
PR INTERVAL: 176 MS
PROT SERPL-MCNC: 6.2 G/DL (ref 6.4–8.2)
Q ONSET: 215 MS
QRS COUNT: 10 BEATS
QRS DURATION: 100 MS
QT INTERVAL: 424 MS
QTC CALCULATION(BAZETT): 424 MS
QTC FREDERICIA: 424 MS
R AXIS: -12 DEGREES
RBC # BLD AUTO: 3.56 X10*6/UL (ref 4–5.2)
SODIUM SERPL-SCNC: 136 MMOL/L (ref 136–145)
T AXIS: 20 DEGREES
T OFFSET: 427 MS
VENTRICULAR RATE: 60 BPM
WBC # BLD AUTO: 5.2 X10*3/UL (ref 4.4–11.3)

## 2025-04-28 PROCEDURE — 71250 CT THORAX DX C-: CPT | Performed by: RADIOLOGY

## 2025-04-28 PROCEDURE — 84484 ASSAY OF TROPONIN QUANT: CPT | Performed by: EMERGENCY MEDICINE

## 2025-04-28 PROCEDURE — 70450 CT HEAD/BRAIN W/O DYE: CPT | Performed by: RADIOLOGY

## 2025-04-28 PROCEDURE — 36415 COLL VENOUS BLD VENIPUNCTURE: CPT | Performed by: EMERGENCY MEDICINE

## 2025-04-28 PROCEDURE — 93005 ELECTROCARDIOGRAM TRACING: CPT

## 2025-04-28 PROCEDURE — 80053 COMPREHEN METABOLIC PANEL: CPT | Performed by: EMERGENCY MEDICINE

## 2025-04-28 PROCEDURE — 85025 COMPLETE CBC W/AUTO DIFF WBC: CPT | Performed by: EMERGENCY MEDICINE

## 2025-04-28 PROCEDURE — 71250 CT THORAX DX C-: CPT

## 2025-04-28 PROCEDURE — 99285 EMERGENCY DEPT VISIT HI MDM: CPT | Mod: 25 | Performed by: EMERGENCY MEDICINE

## 2025-04-28 PROCEDURE — 70450 CT HEAD/BRAIN W/O DYE: CPT

## 2025-04-28 PROCEDURE — 83735 ASSAY OF MAGNESIUM: CPT | Performed by: EMERGENCY MEDICINE

## 2025-04-28 ASSESSMENT — PAIN - FUNCTIONAL ASSESSMENT: PAIN_FUNCTIONAL_ASSESSMENT: 0-10

## 2025-04-28 ASSESSMENT — COLUMBIA-SUICIDE SEVERITY RATING SCALE - C-SSRS
2. HAVE YOU ACTUALLY HAD ANY THOUGHTS OF KILLING YOURSELF?: NO
1. IN THE PAST MONTH, HAVE YOU WISHED YOU WERE DEAD OR WISHED YOU COULD GO TO SLEEP AND NOT WAKE UP?: NO
6. HAVE YOU EVER DONE ANYTHING, STARTED TO DO ANYTHING, OR PREPARED TO DO ANYTHING TO END YOUR LIFE?: NO

## 2025-04-28 ASSESSMENT — PAIN SCALES - GENERAL: PAINLEVEL_OUTOF10: 0 - NO PAIN

## 2025-04-28 NOTE — ED TRIAGE NOTES
Pt. States she has had dizziness with position changes on and off for the past 3 weeks.  Today she got dizzy and lowered herself to the ground, denies falling, denies LOC or hitting her head.

## 2025-04-28 NOTE — ED PROVIDER NOTES
HPI   Chief Complaint   Patient presents with    Dizziness       HPI  Patient is a 79-year-old female brought to the ED today via EMS for dizziness.  Patient states that she has been having dizziness with ambulation/position changes for at least several weeks now.  It is typically worse when she moves too quickly or goes from a sitting to a standing position.  Patient notes that today, she had a similar episode of dizziness and felt like she was going to lose her balance.  She then was able to lower herself to the ground, but never fell or passed out.  She then pressed her life alert button and EMS came and brought her to the ED for further evaluation.  She denies hitting her head or any loss of consciousness.  She is not on any anticoagulation.  She denies any chest pain or shortness of breath.  She denies any focal numbness, weakness, or tingling.  She has no additional concerns.  Patient states that she does take medication for dizziness, but usually takes it in the morning, not when she is symptomatic.  At this time, patient denies any current dizziness.  She is currently asymptomatic.      Patient History   Medical History[1]  Surgical History[2]  Family History[3]  Social History[4]    Physical Exam   ED Triage Vitals [04/28/25 0820]   Temperature Heart Rate Respirations BP   36.4 °C (97.6 °F) 61 15 139/60      Pulse Ox Temp Source Heart Rate Source Patient Position   100 % Temporal Monitor Sitting      BP Location FiO2 (%)     Left arm --       Physical Exam  Vitals and nursing note reviewed.   Constitutional:       General: She is not in acute distress.     Appearance: She is not toxic-appearing.   HENT:      Head: Normocephalic.      Mouth/Throat:      Mouth: Mucous membranes are moist.   Eyes:      Extraocular Movements: Extraocular movements intact.      Conjunctiva/sclera: Conjunctivae normal.   Cardiovascular:      Rate and Rhythm: Normal rate and regular rhythm.   Pulmonary:      Effort: Pulmonary  effort is normal. No respiratory distress.   Abdominal:      General: There is no distension.      Palpations: Abdomen is soft.      Tenderness: There is no abdominal tenderness.   Musculoskeletal:         General: No swelling.      Cervical back: Neck supple.   Skin:     General: Skin is warm and dry.      Capillary Refill: Capillary refill takes less than 2 seconds.   Neurological:      General: No focal deficit present.      Mental Status: She is alert. Mental status is at baseline.      Comments: This patient is awake, alert and oriented to person, place and time. Patient has stuttering speech, but otherwise clear. Cranial nerves II-XII are grossly intact. Strength and sensation are intact in all extremities. No limb ataxia. No pronator drift. NIHSS 0.           ED Course & MDM   ED Course as of 04/28/25 1039   Mon Apr 28, 2025   0834 EKG obtained at 819, interpreted by myself.  Normal sinus rhythm with a ventricular rate of 60, left axis deviation, normal intervals, with no acute ischemic changes [VT]      ED Course User Index  [VT] Shruthi RAYMUNDO MD         Diagnoses as of 04/28/25 1039   Dizziness             No data recorded     [place] Coma Scale Score: 15 (04/28/25 0832 : Isis Dexter RN)                         Medical Decision Making  Patient was seen and evaluated for dizziness.  On arrival, vital signs are unremarkable.  Per chart review, this has been ongoing for at least 5 months.  Patient was seen here in the ED back in November for a fall secondary to her dizziness.  She has since been seen by multiple providers, including a neurologist about 1 week ago for similar symptoms of gait disturbance/dizziness.  Per neurology note from 4/22, patient had a wide-based, unsteady gait at that time.  She was instructed to continue her Trileptal as prescribed, and to follow-up in 1 year.  Patient notes that she also has an upcoming appointment with another neurologist several weeks from now.  Patient has  meclizine prescribed for her vertigo.  Additional lab work and imaging are ordered for further evaluation of her symptoms here in the ED today.    CBC is unremarkable.  CMP is unremarkable.  Magnesium is normal at 1.98.  High-sensitivity troponin is normal at 8.    CT chest wo IV contrast   Final Result   1. No acute pathologic findings are identified radiographically.   2. Stable enlarged right lobe of the thyroid gland.   3. Cholelithiasis.        MACRO:   none        Signed by: Pete Yang 4/28/2025 10:10 AM   Dictation workstation:   [id number]      CT head wo IV contrast   Final Result   No acute intracranial pathologic findings are identified.   Remote left basal ganglia infarcts.   Supratentorial and infratentorial atrophy.   There is no interval change when compared to the previous examination.        MACRO:   none        Signed by: Pete Yang 4/28/2025 10:03 AM   Dictation workstation:   [id number]        On reevaluation, patient is resting comfortably in bed.  She has remained asymptomatic since being here in the ED. Patient was informed of their lab and imaging results, and all questions and concerns were answered. Discharge planning with close outpatient follow-up with her neurologist as well as her PCP was discussed at this time, to which the patient was agreeable. Strict return precautions were given, and patient was discharged home in stable condition.    Procedure  Procedures       [1]   Past Medical History:  Diagnosis Date    Ataxia, unspecified     Ataxia    Other conditions influencing health status     Cognitive Functions Current Level Impaired Mild    Other conditions influencing health status     Screening for malignant neoplasms, colon    Other specified anxiety disorders     Depression with anxiety    Other specified anxiety disorders     Depression with anxiety    Pain in unspecified joint     Multiple joint pain    Personal history of other diseases of the circulatory system  09/27/2018    History of hypertension    Personal history of other diseases of the nervous system and sense organs     History of sleep apnea    Personal history of other specified conditions     History of fatigue    Umbilical hernia without obstruction or gangrene     Umbilical hernia    Unspecified cataract     Cataract of both eyes    Unspecified convulsions (Multi)     Seizure    Unspecified convulsions (Multi)     Convulsive disorder   [2]   Past Surgical History:  Procedure Laterality Date    CATARACT EXTRACTION  07/17/2013    Cataract Surgery    COLONOSCOPY  03/11/2013    Complete Colonoscopy    CT ANGIO NECK  5/22/2020    CT NECK ANGIO W AND WO IV CONTRAST 5/22/2020 [facility]    CT HEAD ANGIO W AND WO IV CONTRAST  1/6/2016    CT HEAD ANGIO W AND WO IV CONTRAST 1/6/2016 [facility]    CT HEAD ANGIO W AND WO IV CONTRAST  3/1/2016    CT HEAD ANGIO W AND WO IV CONTRAST 3/1/2016 [facility]    CT HEAD ANGIO W AND WO IV CONTRAST  5/22/2020    CT HEAD ANGIO W AND WO IV CONTRAST 5/22/2020 [facility]    OTHER SURGICAL HISTORY  03/07/2013    Excision Of Lesion Scalp    OTHER SURGICAL HISTORY  06/08/2016    Intracranial Aneurysm Repair    OTHER SURGICAL HISTORY  07/21/2020    Colonoscopy complete for polypectomy    OTHER SURGICAL HISTORY  05/12/2020    Cyst excision    OTHER SURGICAL HISTORY  05/12/2020    [place] tooth extraction   [3]   Family History  Problem Relation Name Age of Onset    Alzheimer's disease Mother      Colon cancer Father      Diabetes Father      Hypertension Father      Subarachnoid hemorrhage Father          nontraumatic    Hypertension Sister      Other (Cardiac problems) Sister      Cancer Brother      Gout Brother      Hypertension Brother      Pancreatitis Brother      Autism Son      Coronary artery disease Paternal Grandfather      Autism Other          Childhood onset pervasive developmental autistic disorder    Hypertension Other     [4]   Social  History  Tobacco Use    Smoking status: Never    Smokeless tobacco: Never   Vaping Use    Vaping status: Never Used   Substance Use Topics    Alcohol use: Not Currently    Drug use: Never        Shruthi RAYMUNDO MD  04/28/25 8727

## 2025-04-30 ENCOUNTER — TELEPHONE (OUTPATIENT)
Dept: NEUROLOGY | Facility: CLINIC | Age: 80
End: 2025-04-30
Payer: MEDICARE

## 2025-04-30 LAB
10OH-CARBAZEPINE SERPL-MCNC: 52 MCG/ML (ref 8–35)
ANION GAP SERPL CALCULATED.4IONS-SCNC: 9 MMOL/L (CALC) (ref 7–17)
BUN SERPL-MCNC: 19 MG/DL (ref 7–25)
BUN/CREAT SERPL: ABNORMAL (CALC) (ref 6–22)
CALCIUM SERPL-MCNC: 8.3 MG/DL (ref 8.6–10.4)
CHLORIDE SERPL-SCNC: 101 MMOL/L (ref 98–110)
CO2 SERPL-SCNC: 26 MMOL/L (ref 20–32)
CREAT SERPL-MCNC: 0.74 MG/DL (ref 0.6–1)
EGFRCR SERPLBLD CKD-EPI 2021: 83 ML/MIN/1.73M2
GLUCOSE SERPL-MCNC: 94 MG/DL (ref 65–99)
POTASSIUM SERPL-SCNC: 4.4 MMOL/L (ref 3.5–5.3)
SODIUM SERPL-SCNC: 136 MMOL/L (ref 135–146)
VIT B12 SERPL-MCNC: 1596 PG/ML (ref 200–1100)

## 2025-04-30 NOTE — TELEPHONE ENCOUNTER
Copied from CRM #[id number]. Topic: Information Request - Get Appointment Information  >> Apr 30, 2025 11:44 AM Sarah DIAS wrote:  Above patient would like for someone in the office give her a call back, stated she was seen in the emergency room for dizziness and she keeps falling down because of it she  Would like to schedule an appointment asap if possible please give patient a call back

## 2025-05-01 ENCOUNTER — APPOINTMENT (OUTPATIENT)
Dept: RHEUMATOLOGY | Facility: CLINIC | Age: 80
End: 2025-05-01
Payer: MEDICARE

## 2025-05-05 ENCOUNTER — APPOINTMENT (OUTPATIENT)
Dept: NEUROLOGY | Facility: CLINIC | Age: 80
End: 2025-05-05
Payer: COMMERCIAL

## 2025-05-05 VITALS
SYSTOLIC BLOOD PRESSURE: 169 MMHG | DIASTOLIC BLOOD PRESSURE: 78 MMHG | HEIGHT: 62 IN | HEART RATE: 71 BPM | WEIGHT: 214 LBS | RESPIRATION RATE: 18 BRPM | BODY MASS INDEX: 39.38 KG/M2

## 2025-05-05 DIAGNOSIS — R42 DIZZY: Primary | ICD-10-CM

## 2025-05-05 PROCEDURE — 1036F TOBACCO NON-USER: CPT | Performed by: PSYCHIATRY & NEUROLOGY

## 2025-05-05 PROCEDURE — 99213 OFFICE O/P EST LOW 20 MIN: CPT | Performed by: PSYCHIATRY & NEUROLOGY

## 2025-05-05 PROCEDURE — 1126F AMNT PAIN NOTED NONE PRSNT: CPT | Performed by: PSYCHIATRY & NEUROLOGY

## 2025-05-05 PROCEDURE — 3078F DIAST BP <80 MM HG: CPT | Performed by: PSYCHIATRY & NEUROLOGY

## 2025-05-05 PROCEDURE — 1159F MED LIST DOCD IN RCRD: CPT | Performed by: PSYCHIATRY & NEUROLOGY

## 2025-05-05 PROCEDURE — 3077F SYST BP >= 140 MM HG: CPT | Performed by: PSYCHIATRY & NEUROLOGY

## 2025-05-05 PROCEDURE — 1157F ADVNC CARE PLAN IN RCRD: CPT | Performed by: PSYCHIATRY & NEUROLOGY

## 2025-05-05 ASSESSMENT — PATIENT HEALTH QUESTIONNAIRE - PHQ9
SUM OF ALL RESPONSES TO PHQ9 QUESTIONS 1 AND 2: 0
2. FEELING DOWN, DEPRESSED OR HOPELESS: NOT AT ALL
1. LITTLE INTEREST OR PLEASURE IN DOING THINGS: NOT AT ALL

## 2025-05-05 ASSESSMENT — ENCOUNTER SYMPTOMS
OCCASIONAL FEELINGS OF UNSTEADINESS: 0
DEPRESSION: 0
LOSS OF SENSATION IN FEET: 0

## 2025-05-05 ASSESSMENT — PAIN SCALES - GENERAL: PAINLEVEL_OUTOF10: 0-NO PAIN

## 2025-05-05 NOTE — PROGRESS NOTES
Subjective   Mariajose Haq is a 79 y.o.   female.  HPI  This is a 79 year old woman with a seizure disorder, chronic dizziness, last seen in 4/22/25.  Since her last appointment, she had an episode of dizziness- mainly feeling like she was going to pass out, fall down, last major episode was 4/28/25, evaluated at [facility].  The last episode was yesterday morning, sitting on the toilet- became real lightheaded, asked her son to take her sweater off, she pulled herself up, then used the walker, less lightheaded, walked to the bed, sat on the bed- cancelled her ride to the [employer].  Today her BP was 169/78- taken twice.  She usually uses a wheeled walker.  Objective   Neurological Exam  Alert and pleasant older woman.  Physical Exam  I personally reviewed laboratory, radiographic, and medical studies which were pertinent for nothing.    Assessment/Plan

## 2025-05-05 NOTE — PATIENT INSTRUCTIONS
You likely have postural dizziness with a background of HTN.  I recommend following up with your Cardiologist.  I do not think we can raise your blood pressure- would be dangerous, especially when lying down.    As discussed, I recommend drinking more water during the day, take a couple of salt tablets during the day.  Stand up slowly, turn slowly.  You have a chronic postural dizziness problem- you have to adapt to it.  Follow up in one year with Juliet KOENIG.

## 2025-05-07 ENCOUNTER — APPOINTMENT (OUTPATIENT)
Dept: CARDIOLOGY | Facility: HOSPITAL | Age: 80
End: 2025-05-07
Payer: MEDICARE

## 2025-05-07 ENCOUNTER — APPOINTMENT (OUTPATIENT)
Dept: RADIOLOGY | Facility: HOSPITAL | Age: 80
End: 2025-05-07
Payer: MEDICARE

## 2025-05-07 ENCOUNTER — HOSPITAL ENCOUNTER (EMERGENCY)
Facility: HOSPITAL | Age: 80
Discharge: HOME | End: 2025-05-07
Payer: MEDICARE

## 2025-05-07 VITALS
DIASTOLIC BLOOD PRESSURE: 75 MMHG | BODY MASS INDEX: 39.56 KG/M2 | TEMPERATURE: 96.8 F | WEIGHT: 215 LBS | SYSTOLIC BLOOD PRESSURE: 133 MMHG | HEIGHT: 62 IN | RESPIRATION RATE: 18 BRPM | HEART RATE: 61 BPM | OXYGEN SATURATION: 99 %

## 2025-05-07 DIAGNOSIS — R55 NEAR SYNCOPE: Primary | ICD-10-CM

## 2025-05-07 LAB
ALBUMIN SERPL BCP-MCNC: 4 G/DL (ref 3.4–5)
ALP SERPL-CCNC: 127 U/L (ref 33–136)
ALT SERPL W P-5'-P-CCNC: 19 U/L (ref 7–45)
ANION GAP SERPL CALC-SCNC: 12 MMOL/L (ref 10–20)
AST SERPL W P-5'-P-CCNC: 31 U/L (ref 9–39)
ATRIAL RATE: 58 BPM
ATRIAL RATE: 61 BPM
BASOPHILS # BLD AUTO: 0.04 X10*3/UL (ref 0–0.1)
BASOPHILS NFR BLD AUTO: 0.8 %
BILIRUB SERPL-MCNC: 0.3 MG/DL (ref 0–1.2)
BUN SERPL-MCNC: 19 MG/DL (ref 6–23)
CALCIUM SERPL-MCNC: 8.6 MG/DL (ref 8.6–10.3)
CARDIAC TROPONIN I PNL SERPL HS: 10 NG/L (ref 0–13)
CARDIAC TROPONIN I PNL SERPL HS: 9 NG/L (ref 0–13)
CHLORIDE SERPL-SCNC: 99 MMOL/L (ref 98–107)
CO2 SERPL-SCNC: 28 MMOL/L (ref 21–32)
CREAT SERPL-MCNC: 0.78 MG/DL (ref 0.5–1.05)
EGFRCR SERPLBLD CKD-EPI 2021: 78 ML/MIN/1.73M*2
EOSINOPHIL # BLD AUTO: 0.11 X10*3/UL (ref 0–0.4)
EOSINOPHIL NFR BLD AUTO: 2.3 %
ERYTHROCYTE [DISTWIDTH] IN BLOOD BY AUTOMATED COUNT: 14.1 % (ref 11.5–14.5)
GLUCOSE SERPL-MCNC: 80 MG/DL (ref 74–99)
HCT VFR BLD AUTO: 35.6 % (ref 36–46)
HGB BLD-MCNC: 12.4 G/DL (ref 12–16)
IMM GRANULOCYTES # BLD AUTO: 0.01 X10*3/UL (ref 0–0.5)
IMM GRANULOCYTES NFR BLD AUTO: 0.2 % (ref 0–0.9)
LYMPHOCYTES # BLD AUTO: 1.49 X10*3/UL (ref 0.8–3)
LYMPHOCYTES NFR BLD AUTO: 31.2 %
MAGNESIUM SERPL-MCNC: 1.96 MG/DL (ref 1.6–2.4)
MCH RBC QN AUTO: 33.2 PG (ref 26–34)
MCHC RBC AUTO-ENTMCNC: 34.8 G/DL (ref 32–36)
MCV RBC AUTO: 95 FL (ref 80–100)
MONOCYTES # BLD AUTO: 0.64 X10*3/UL (ref 0.05–0.8)
MONOCYTES NFR BLD AUTO: 13.4 %
NEUTROPHILS # BLD AUTO: 2.48 X10*3/UL (ref 1.6–5.5)
NEUTROPHILS NFR BLD AUTO: 52.1 %
NRBC BLD-RTO: 0 /100 WBCS (ref 0–0)
P AXIS: 38 DEGREES
P AXIS: 52 DEGREES
P OFFSET: 179 MS
P OFFSET: 180 MS
P ONSET: 115 MS
P ONSET: 122 MS
PLATELET # BLD AUTO: 161 X10*3/UL (ref 150–450)
POTASSIUM SERPL-SCNC: 4.4 MMOL/L (ref 3.5–5.3)
PR INTERVAL: 194 MS
PR INTERVAL: 204 MS
PROT SERPL-MCNC: 6.5 G/DL (ref 6.4–8.2)
Q ONSET: 217 MS
Q ONSET: 219 MS
QRS COUNT: 10 BEATS
QRS COUNT: 9 BEATS
QRS DURATION: 102 MS
QRS DURATION: 96 MS
QT INTERVAL: 398 MS
QT INTERVAL: 400 MS
QTC CALCULATION(BAZETT): 392 MS
QTC CALCULATION(BAZETT): 400 MS
QTC FREDERICIA: 395 MS
QTC FREDERICIA: 399 MS
R AXIS: -12 DEGREES
R AXIS: -12 DEGREES
RBC # BLD AUTO: 3.73 X10*6/UL (ref 4–5.2)
SODIUM SERPL-SCNC: 135 MMOL/L (ref 136–145)
T AXIS: 42 DEGREES
T AXIS: 58 DEGREES
T OFFSET: 416 MS
T OFFSET: 419 MS
VENTRICULAR RATE: 58 BPM
VENTRICULAR RATE: 61 BPM
WBC # BLD AUTO: 4.8 X10*3/UL (ref 4.4–11.3)

## 2025-05-07 PROCEDURE — 83735 ASSAY OF MAGNESIUM: CPT | Performed by: PHYSICIAN ASSISTANT

## 2025-05-07 PROCEDURE — 84075 ASSAY ALKALINE PHOSPHATASE: CPT | Performed by: PHYSICIAN ASSISTANT

## 2025-05-07 PROCEDURE — 84484 ASSAY OF TROPONIN QUANT: CPT | Performed by: PHYSICIAN ASSISTANT

## 2025-05-07 PROCEDURE — 99285 EMERGENCY DEPT VISIT HI MDM: CPT | Mod: 25

## 2025-05-07 PROCEDURE — 85025 COMPLETE CBC W/AUTO DIFF WBC: CPT | Performed by: PHYSICIAN ASSISTANT

## 2025-05-07 PROCEDURE — 71045 X-RAY EXAM CHEST 1 VIEW: CPT

## 2025-05-07 PROCEDURE — 71045 X-RAY EXAM CHEST 1 VIEW: CPT | Performed by: RADIOLOGY

## 2025-05-07 PROCEDURE — 36415 COLL VENOUS BLD VENIPUNCTURE: CPT | Performed by: PHYSICIAN ASSISTANT

## 2025-05-07 PROCEDURE — 70450 CT HEAD/BRAIN W/O DYE: CPT | Performed by: RADIOLOGY

## 2025-05-07 PROCEDURE — 70450 CT HEAD/BRAIN W/O DYE: CPT

## 2025-05-07 PROCEDURE — 93005 ELECTROCARDIOGRAM TRACING: CPT

## 2025-05-07 ASSESSMENT — PAIN SCALES - GENERAL: PAINLEVEL_OUTOF10: 0 - NO PAIN

## 2025-05-07 ASSESSMENT — LIFESTYLE VARIABLES
EVER FELT BAD OR GUILTY ABOUT YOUR DRINKING: NO
TOTAL SCORE: 0
HAVE PEOPLE ANNOYED YOU BY CRITICIZING YOUR DRINKING: NO
EVER HAD A DRINK FIRST THING IN THE MORNING TO STEADY YOUR NERVES TO GET RID OF A HANGOVER: NO
HAVE YOU EVER FELT YOU SHOULD CUT DOWN ON YOUR DRINKING: NO

## 2025-05-07 ASSESSMENT — PAIN - FUNCTIONAL ASSESSMENT: PAIN_FUNCTIONAL_ASSESSMENT: 0-10

## 2025-05-07 NOTE — ED TRIAGE NOTES
Pt presented to the ED with c/o dizziness. Pt states the dizziness has been going on fro about 8 months now. Pt was seen on 4/28 at [place] ED and  with postural dizziness and recommended to follow up with cardiology and neurology. Pt had a neuro appointment today but it was canceled. Pt states she only feels dizzy when she moves. The dizziness has not changed since 4/28. Pt denies any N/V. Neuro is intact with no deficits.

## 2025-05-07 NOTE — ED PROVIDER NOTES
HPI   Chief Complaint   Patient presents with    Dizziness       Is a 79-year-old female coming in for near syncope event.  Patient reports that she felt lightheaded and dizzy however this is a daily occurrence for the patient.  She has seen outpatient neurology for the symptoms.  She states that they were concerned that may be a cardiac issue and advised to follow-up with cardiology.  She has seen them as well.  Patient has been eating and drinking as per usual.  She denies any chest pain or shortness of breath when this event occurred.  She states she did not have syncope.  No loss consciousness.  Currently she reports that she feels back to her baseline.      History provided by:  Patient and EMS personnel          Patient History   Medical History[1]  Surgical History[2]  Family History[3]  Social History[4]    Physical Exam   ED Triage Vitals [05/07/25 1146]   Temperature Heart Rate Respirations BP   36 °C (96.8 °F) 63 18 147/70      Pulse Ox Temp src Heart Rate Source Patient Position   99 % -- Monitor Sitting      BP Location FiO2 (%)     -- --       Physical Exam  Vitals and nursing note reviewed.   Constitutional:       General: She is not in acute distress.     Appearance: Normal appearance. She is not toxic-appearing.   HENT:      Head: Normocephalic and atraumatic.      Nose: Nose normal.      Mouth/Throat:      Mouth: Mucous membranes are moist.      Pharynx: Oropharynx is clear.   Eyes:      Extraocular Movements: Extraocular movements intact.      Conjunctiva/sclera: Conjunctivae normal.      Pupils: Pupils are equal, round, and reactive to light.   Cardiovascular:      Rate and Rhythm: Normal rate and regular rhythm.      Pulses: Normal pulses.      Heart sounds: Normal heart sounds.   Pulmonary:      Effort: Pulmonary effort is normal. No respiratory distress.      Breath sounds: Normal breath sounds.   Abdominal:      General: Abdomen is flat. Bowel sounds are normal.      Palpations: Abdomen is  soft.      Tenderness: There is no abdominal tenderness.   Musculoskeletal:         General: Normal range of motion.      Cervical back: Normal range of motion and neck supple.   Skin:     General: Skin is warm and dry.      Coloration: Skin is not jaundiced or pale.      Findings: No bruising.   Neurological:      General: No focal deficit present.      Mental Status: She is alert and oriented to person, place, and time. Mental status is at baseline.      GCS: GCS eye subscore is 4. GCS verbal subscore is 5. GCS motor subscore is 6.      Cranial Nerves: Cranial nerves 2-12 are intact.      Comments: NIH 0.  Normal finger-nose.  No truncal ataxia.  Negative test of skew.   Psychiatric:         Mood and Affect: Mood normal.         Behavior: Behavior normal.           ED Course & MDM   ED Course as of 05/07/25 1520   Wed May 07, 2025   1150 EKG completed at 1140 shows on my interpretation normal sinus rhythm with a ventricular rate of 61 bpm.  No signs of STEMI.  QTc 400 ms. [RS]      ED Course User Index  [RS] Maciej Leonard PA-C         Diagnoses as of 05/07/25 1520   Near syncope                 No data recorded     Ana Coma Scale Score: 15 (05/07/25 1147 : Viktoriya Jang RN)                           Medical Decision Making  Summary:  Medical Decision Making:   Patient presented as described in HPI. Patient case including ROS, PE, and treatment and plan discussed with ED attending if attached as cosigner. Results from labs and or imaging included below if completed. Mariajose Haq  is a 79 y.o. coming in for Patient presents with:  Dizziness  .  Patient complains of dizziness.  She has an NIH of 0.  Due to her complaints a CT scan of the head was completed and was negative.  Chest x-ray was also performed and was negative.  Lab work showed no acute concerning abnormalities.  She has 2 negative troponins.  Patient was made aware of the tests.  Orthostatics were completed on the patient.  She was not  lightheaded or dizzy when this occurred.  Patient feels back to her baseline and will be discharged with continued follow-up with her outpatient providers.  She states she does feel much better and prefers to go home.      Disposition is completed with shared decision making with the patient or guardian present with the patient. They were advised to follow up with PCP or recommended provider in 2-3 days for another evaluation and exam. I advised the patient to return or go to closest emergency room immediately if symptoms change, get worse, or new symptoms develop prior to follow up. I explained the plan and treatment course. Patient/guardian is in agreement with plan, treatment course, and follow up and state that they will comply.    Labs Reviewed  CBC WITH AUTO DIFFERENTIAL - Abnormal     WBC                           4.8                    nRBC                          0.0                    RBC                           3.73 (*)               Hemoglobin                    12.4                   Hematocrit                    35.6 (*)               MCV                           95                     MCH                           33.2                   MCHC                          34.8                   RDW                           14.1                   Platelets                     161                    Neutrophils %                 52.1                   Immature Granulocytes %, Automated   0.2                    Lymphocytes %                 31.2                   Monocytes %                   13.4                   Eosinophils %                 2.3                    Basophils %                   0.8                    Neutrophils Absolute          2.48                   Immature Granulocytes Absolute, Au*   0.01                   Lymphocytes Absolute          1.49                   Monocytes Absolute            0.64                   Eosinophils Absolute          0.11                   Basophils Absolute             0.04                COMPREHENSIVE METABOLIC PANEL - Abnormal     Glucose                       80                     Sodium                        135 (*)                Potassium                     4.4                    Chloride                      99                     Bicarbonate                   28                     Anion Gap                     12                     Urea Nitrogen                 19                     Creatinine                    0.78                   eGFR                          78                     Calcium                       8.6                    Albumin                       4.0                    Alkaline Phosphatase          127                    Total Protein                 6.5                    AST                           31                     Bilirubin, Total              0.3                    ALT                           19                  MAGNESIUM - Normal     Magnesium                     1.96                SERIAL TROPONIN-INITIAL - Normal     Troponin I, High Sensitivity   10                       Narrative: Less than 99th percentile of normal range cutoff-                Female and children under 18 years old <14 ng/L; Male <21 ng/L: Negative                Repeat testing should be performed if clinically indicated.                                 Female and children under 18 years old 14-50 ng/L; Male 21-50 ng/L:                Consistent with possible cardiac damage and possible increased clinical                 risk. Serial measurements may help to assess extent of myocardial damage.                                 >50 ng/L: Consistent with cardiac damage, increased clinical risk and                myocardial infarction. Serial measurements may help assess extent of                 myocardial damage.                                  NOTE: Children less than 1 year old may have higher baseline troponin                 levels and results  should be interpreted in conjunction with the overall                 clinical context.                                 NOTE: Troponin I testing is performed using a different                 testing methodology at [facility] than at other                 system [facility]. Direct result comparisons should only                 be made within the same method.  SERIAL TROPONIN, 1 HOUR - Normal     Troponin I, High Sensitivity   9                        Narrative: Less than 99th percentile of normal range cutoff-                Female and children under 18 years old <14 ng/L; Male <21 ng/L: Negative                Repeat testing should be performed if clinically indicated.                                 Female and children under 18 years old 14-50 ng/L; Male 21-50 ng/L:                Consistent with possible cardiac damage and possible increased clinical                 risk. Serial measurements may help to assess extent of myocardial damage.                                 >50 ng/L: Consistent with cardiac damage, increased clinical risk and                myocardial infarction. Serial measurements may help assess extent of                 myocardial damage.                                  NOTE: Children less than 1 year old may have higher baseline troponin                 levels and results should be interpreted in conjunction with the overall                 clinical context.                                 NOTE: Troponin I testing is performed using a different                 testing methodology at [facility] than at other                 [facility]. Direct result comparisons should only                 be made within the same method.  TROPONIN SERIES- (INITIAL, 1 HR)   XR chest 1 view   Final Result    Allowing for the aforementioned limitation, no acute cardiopulmonary    disease.          Signed by: Ananda Gonsalez 5/7/2025 1:20 PM    Dictation workstation:   [id number]      CT head wo IV contrast   Final Result    Age-related/chronic changes similar to prior. No acute intracranial    process.                      MACRO:    None.          Signed by: Jason Forde 5/7/2025 1:06 PM    Dictation workstation:   [id number]                            Tests/Medications/Escalations of Care considered but not given: As in [facility] [place]    Patient care discussed with: N/A  Social Determinants affecting care: N/A    Final diagnosis and disposition as documented     ED Course as of 05/07/25 2306  ------------------------------------------------------------  Time: 05/07 1150  Comment: EKG completed at 1140 shows on my interpretation normal sinus rhythm with a ventricular rate of 61 bpm.  No signs of STEMI.  QTc 400 ms.  By: Maciej Leonard PA-C    ------------------------------------------------------------  Diagnoses as of 05/07/25 2306  Near syncope       Shared decision making was completed and determined that patient will be discharged. I discussed the differential; results and discharge plan with the patient and/or family/friend/caregiver if present.  I emphasized the importance of follow-up with the physician I referred them to in the timeframe recommended.  I explained reasons for the patient to return to the Emergency Department. They agreed that if they feel their condition is worsening or if they have any other concern they should call 911 immediately for further assistance. I gave the patient an opportunity to ask all questions they had and answered all of them accordingly. They understand return precautions and discharge instructions. The patient and/or family/friend/caregiver expressed understanding verbally and that they would comply.     Disposition: Discharge      This note has been transcribed using voice recognition and may contain grammatical errors, misplaced words, incorrect words, incorrect phrases or other errors.         Procedure  Procedures         [1]   Past Medical  History:  Diagnosis Date    Ataxia, unspecified     Ataxia    Other conditions influencing health status     Cognitive Functions Current Level Impaired Mild    Other conditions influencing health status     Screening for malignant neoplasms, colon    Other specified anxiety disorders     Depression with anxiety    Other specified anxiety disorders     Depression with anxiety    Pain in unspecified joint     Multiple joint pain    Personal history of other diseases of the circulatory system 09/27/2018    History of hypertension    Personal history of other diseases of the nervous system and sense organs     History of sleep apnea    Personal history of other specified conditions     History of fatigue    Umbilical hernia without obstruction or gangrene     Umbilical hernia    Unspecified cataract     Cataract of both eyes    Unspecified convulsions (Multi)     Seizure    Unspecified convulsions (Multi)     Convulsive disorder   [2]   Past Surgical History:  Procedure Laterality Date    CATARACT EXTRACTION  07/17/2013    Cataract Surgery    COLONOSCOPY  03/11/2013    Complete Colonoscopy    CT ANGIO NECK  5/22/2020    CT NECK ANGIO W AND WO IV CONTRAST 5/22/2020 [facility]    CT HEAD ANGIO W AND WO IV CONTRAST  1/6/2016    CT HEAD ANGIO W AND WO IV CONTRAST 1/6/2016 [facility]    CT HEAD ANGIO W AND WO IV CONTRAST  3/1/2016    CT HEAD ANGIO W AND WO IV CONTRAST 3/1/2016 [facility]    CT HEAD ANGIO W AND WO IV CONTRAST  5/22/2020    CT HEAD ANGIO W AND WO IV CONTRAST 5/22/2020 [facility]    OTHER SURGICAL HISTORY  03/07/2013    Excision Of Lesion Scalp    OTHER SURGICAL HISTORY  06/08/2016    Intracranial Aneurysm Repair    OTHER SURGICAL HISTORY  07/21/2020    Colonoscopy complete for polypectomy    OTHER SURGICAL HISTORY  05/12/2020    Cyst excision    OTHER SURGICAL HISTORY  05/12/2020    Novinger tooth extraction   [3]   Family History  Problem Relation Name Age of Onset     Alzheimer's disease Mother      Colon cancer Father      Diabetes Father      Hypertension Father      Subarachnoid hemorrhage Father          nontraumatic    Hypertension Sister      Other (Cardiac problems) Sister      Cancer Brother      Gout Brother      Hypertension Brother      Pancreatitis Brother      Autism Son      Coronary artery disease Paternal Grandfather      Autism Other          Childhood onset pervasive developmental autistic disorder    Hypertension Other     [4]   Social History  Tobacco Use    Smoking status: Never    Smokeless tobacco: Never   Vaping Use    Vaping status: Never Used   Substance Use Topics    Alcohol use: Not Currently    Drug use: Never        Maciej Leonard PA-C  05/07/25 0277

## 2025-05-13 ENCOUNTER — APPOINTMENT (OUTPATIENT)
Dept: NEUROLOGY | Facility: CLINIC | Age: 80
End: 2025-05-13
Payer: MEDICARE

## 2025-05-13 DIAGNOSIS — M06.9 RHEUMATOID ARTHRITIS, INVOLVING UNSPECIFIED SITE, UNSPECIFIED WHETHER RHEUMATOID FACTOR PRESENT (MULTI): ICD-10-CM

## 2025-05-13 RX ORDER — METHOTREXATE 2.5 MG/1
12.5 TABLET ORAL
Qty: 20 TABLET | Refills: 5 | Status: SHIPPED | OUTPATIENT
Start: 2025-05-18 | End: 2026-05-18

## 2025-05-15 LAB
ATRIAL RATE: 58 BPM
ATRIAL RATE: 61 BPM
P AXIS: 38 DEGREES
P AXIS: 52 DEGREES
P OFFSET: 179 MS
P OFFSET: 180 MS
P ONSET: 115 MS
P ONSET: 122 MS
PR INTERVAL: 194 MS
PR INTERVAL: 204 MS
Q ONSET: 217 MS
Q ONSET: 219 MS
QRS COUNT: 10 BEATS
QRS COUNT: 9 BEATS
QRS DURATION: 102 MS
QRS DURATION: 96 MS
QT INTERVAL: 398 MS
QT INTERVAL: 400 MS
QTC CALCULATION(BAZETT): 392 MS
QTC CALCULATION(BAZETT): 400 MS
QTC FREDERICIA: 395 MS
QTC FREDERICIA: 399 MS
R AXIS: -12 DEGREES
R AXIS: -12 DEGREES
T AXIS: 42 DEGREES
T AXIS: 58 DEGREES
T OFFSET: 416 MS
T OFFSET: 419 MS
VENTRICULAR RATE: 58 BPM
VENTRICULAR RATE: 61 BPM

## 2025-06-06 ENCOUNTER — OFFICE VISIT (OUTPATIENT)
Dept: CARDIOLOGY | Facility: HOSPITAL | Age: 80
End: 2025-06-06
Payer: MEDICARE

## 2025-06-06 VITALS — OXYGEN SATURATION: 99 % | DIASTOLIC BLOOD PRESSURE: 82 MMHG | HEART RATE: 63 BPM | SYSTOLIC BLOOD PRESSURE: 164 MMHG

## 2025-06-06 DIAGNOSIS — R55 SYNCOPE, UNSPECIFIED SYNCOPE TYPE: ICD-10-CM

## 2025-06-06 DIAGNOSIS — R26.89 BALANCE DISORDER: ICD-10-CM

## 2025-06-06 DIAGNOSIS — I10 ESSENTIAL (PRIMARY) HYPERTENSION: ICD-10-CM

## 2025-06-06 DIAGNOSIS — R42 DIZZINESS AND GIDDINESS: Primary | ICD-10-CM

## 2025-06-06 PROCEDURE — 99212 OFFICE O/P EST SF 10 MIN: CPT | Performed by: NURSE PRACTITIONER

## 2025-06-06 PROCEDURE — 1036F TOBACCO NON-USER: CPT | Performed by: NURSE PRACTITIONER

## 2025-06-06 PROCEDURE — G2211 COMPLEX E/M VISIT ADD ON: HCPCS | Performed by: NURSE PRACTITIONER

## 2025-06-06 PROCEDURE — 3077F SYST BP >= 140 MM HG: CPT | Performed by: NURSE PRACTITIONER

## 2025-06-06 PROCEDURE — 1159F MED LIST DOCD IN RCRD: CPT | Performed by: NURSE PRACTITIONER

## 2025-06-06 PROCEDURE — 99214 OFFICE O/P EST MOD 30 MIN: CPT | Performed by: NURSE PRACTITIONER

## 2025-06-06 PROCEDURE — 3079F DIAST BP 80-89 MM HG: CPT | Performed by: NURSE PRACTITIONER

## 2025-06-06 ASSESSMENT — ENCOUNTER SYMPTOMS
MYALGIAS: 1
DIZZINESS: 1
PSYCHIATRIC NEGATIVE: 1
CONSTITUTIONAL NEGATIVE: 1
BLURRED VISION: 1
GASTROINTESTINAL NEGATIVE: 1
ALLERGIC/IMMUNOLOGIC NEGATIVE: 1
ENDOCRINE NEGATIVE: 1
CARDIOVASCULAR NEGATIVE: 1
RESPIRATORY NEGATIVE: 1
LIGHT-HEADEDNESS: 1

## 2025-06-06 NOTE — PROGRESS NOTES
Referred by Dr. Jolynn mireles. provider found for Follow-up (1 yr.) and Dizziness (Worsening over the last few months per patient.)     History Of Present Illness:    Mariajose Haq is a very pleasant 79 year old female with a history of HTN and syncope she is here for a follow up visit. The patient is seen in collaboration with Dr. Tran. Mrs. Haq complains of dizziness. Does not check blood pressure on a regular basis. Feels the dizziness is worse. She as been falling at home.     Review of Systems   Constitutional: Negative.   HENT: Negative.     Eyes:  Positive for blurred vision.   Cardiovascular: Negative.    Respiratory: Negative.     Endocrine: Negative.    Skin: Negative.    Musculoskeletal:  Positive for muscle weakness and myalgias.   Gastrointestinal: Negative.    Neurological:  Positive for dizziness and light-headedness.   Psychiatric/Behavioral: Negative.     Allergic/Immunologic: Negative.         Past Medical History:  She has a past medical history of Ataxia, unspecified, Other conditions influencing health status, Other conditions influencing health status, Other specified anxiety disorders, Other specified anxiety disorders, Pain in unspecified joint, Personal history of other diseases of the circulatory system (09/27/2018), Personal history of other diseases of the nervous system and sense organs, Personal history of other specified conditions, Umbilical hernia without obstruction or gangrene, Unspecified cataract, Unspecified convulsions (Multi), and Unspecified convulsions (Multi).    Past Surgical History:  She has a past surgical history that includes Other surgical history (03/07/2013); Colonoscopy (03/11/2013); Other surgical history (06/08/2016); Other surgical history (07/21/2020); Cataract extraction (07/17/2013); Other surgical history (05/12/2020); Other surgical history (05/12/2020); CT angio head w and wo IV contrast (1/6/2016); CT angio head w and wo IV contrast (3/1/2016); CT  angio head w and wo IV contrast (5/22/2020); and CT angio neck (5/22/2020).      Social History:  She reports that she has never smoked. She has never used smokeless tobacco. She reports that she does not currently use alcohol. She reports that she does not use drugs.    Family History:  Family History   Problem Relation Name Age of Onset    Alzheimer's disease Mother      Colon cancer Father      Diabetes Father      Hypertension Father      Subarachnoid hemorrhage Father          nontraumatic    Hypertension Sister      Other (Cardiac problems) Sister      Cancer Brother      Gout Brother      Hypertension Brother      Pancreatitis Brother      Autism Son      Coronary artery disease Paternal Grandfather      Autism Other          Childhood onset pervasive developmental autistic disorder    Hypertension Other          Allergies:  Clobazam, Lamotrigine, Levetiracetam, Topiramate, and Penicillins    Outpatient Medications:  Current Outpatient Medications   Medication Instructions    amLODIPine (NORVASC) 5 mg, oral, Every 12 hours    antiox.mv no.10/omeg3s/lut/day (I-CAPS ORAL) 1 capsule, Daily    atorvastatin (LIPITOR) 40 mg, oral, Daily    cholecalciferol (Vitamin D-3) 50 mcg (2,000 unit) capsule 1 capsule, Daily    cyanocobalamin (Vitamin B-12) 100 mcg tablet 1 tablet, Daily    folic acid (FOLVITE) 1 mg, oral, Daily    furosemide (Lasix) 40 mg tablet TAKE 1 TABLET BY MOUTH EVERY DAY AS NEEDED    guaiFENesin (Mucinex) 600 mg 12 hr tablet 1 tablet, Daily PRN    ibuprofen-diphenhydramine cit 200-38 mg tablet per tablet 2 tablets, Nightly    magnesium oxide (MAG-OX) 250 mg    methotrexate (TREXALL) 12.5 mg, oral, Once Weekly    nystatin (Mycostatin) cream Topical, Daily    OXcarbazepine (TRILEPTAL) 1,050 mg, oral, 2 times daily        Last Recorded Vitals:  Vitals:    06/06/25 1452   BP: 164/82   Pulse: 63   SpO2: 99%         Physical Exam:  Physical Exam  Vitals reviewed.   HENT:      Head: Normocephalic.      Nose:  Nose normal.   Eyes:      Pupils: Pupils are equal, round, and reactive to light.   Cardiovascular:      Rate and Rhythm: Normal rate and regular rhythm.   Pulmonary:      Effort: Pulmonary effort is normal.      Breath sounds: Normal breath sounds.   Abdominal:      General: Abdomen is flat.      Palpations: Abdomen is soft.   Musculoskeletal:         General: Normal range of motion.      Cervical back: Normal range of motion.   Skin:     General: Skin is warm and dry.   Neurological:      General: No focal deficit present.      Mental Status: He is alert and oriented to person, place, and time.   Psychiatric:         Mood and Affect: Mood normal.            Last Labs:  CBC -  Lab Results   Component Value Date    WBC 4.8 05/07/2025    HGB 12.4 05/07/2025    HCT 35.6 (L) 05/07/2025    MCV 95 05/07/2025     05/07/2025       CMP -  Lab Results   Component Value Date    CALCIUM 8.6 05/07/2025    CALCIUM 8.3 (L) 04/28/2025    PHOS 2.9 10/27/2021    PROT 6.5 05/07/2025    ALBUMIN 4.0 05/07/2025    AST 31 05/07/2025    ALT 19 05/07/2025    ALKPHOS 127 05/07/2025    BILITOT 0.3 05/07/2025       LIPID PANEL -   Lab Results   Component Value Date    CHOL 240 (H) 11/11/2022    TRIG 75 11/11/2022    HDL 88.8 11/11/2022    CHHDL 2.7 11/11/2022    LDLF 136 (H) 11/11/2022    VLDL 15 11/11/2022       RENAL FUNCTION PANEL -   Lab Results   Component Value Date    GLUCOSE 80 05/07/2025    GLUCOSE 94 04/28/2025     (L) 05/07/2025     04/28/2025    K 4.4 05/07/2025    K 4.4 04/28/2025    CL 99 05/07/2025     04/28/2025    CO2 28 05/07/2025    CO2 26 04/28/2025    ANIONGAP 12 05/07/2025    ANIONGAP 9 04/28/2025    BUN 19 05/07/2025    BUN 19 04/28/2025    CREATININE 0.78 05/07/2025    CREATININE 0.74 04/28/2025    CALCIUM 8.6 05/07/2025    CALCIUM 8.3 (L) 04/28/2025    PHOS 2.9 10/27/2021    ALBUMIN 4.0 05/07/2025        Lab Results   Component Value Date    BNP 55 02/06/2025       Last Cardiology  Tests:  ECG:    Echo:  Echocardiogram 5/17/2024   1. Left ventricular systolic function is normal with a 60-65% estimated ejection fraction.   2. Spectral Doppler shows an impaired relaxation pattern of left ventricular diastolic filling.   3. Mild aortic valve stenosis.   4. Mild aortic valve regurgitation.   5. The estimated pulmonary artery pressure is mildly elevated with the RVSP at 42.8 mmHg.    Echocardiogram 4/8/2022  1. The left ventricular systolic function is normal with a 55-60% estimated ejection fraction.  2. Spectral Doppler shows an impaired relaxation pattern of left ventricular diastolic filling.  3. Mild aortic valve stenosis.  4. There is mild aortic valve regurgitation.  5. There is mild mitral and tricuspid regurgitation.  6. The estimated pulmonary artery pressure is mildly elevated with the RVSP at 40.5 mmHg.      Echocardiogram 3/22/2021  1. The left ventricular systolic function is normal with a 60-65% estimated  ejection fraction.  2. Spectral Doppler shows an impaired relaxation pattern of left ventricular  diastolic filling.  3. The left atrium is moderately dilated.  4. Mild aortic valve stenosis.  5. There is mild aortic valve regurgitation.  6. There is mild mitral and tricuspid regurgitation.  7. The estimated pulmonary artery pressure is mildly elevated with the RVSP at  46.2 mmHg.    echocardiogram 5/25/2018   1. The left ventricular systolic function is normal with a 60% estimated  ejection fraction.   2. Spectral Doppler shows an impaired relaxation pattern of left ventricular  diastolic filling.   3. The left atrium is moderately dilated.   4. Mild aortic valve stenosis.   5. The estimated pulmonary artery pressure is mildly elevated with the RVSP at  36.5 mmHg.  Echo August 2013:  The left ventricular chamber size is normal.  There is left ventricular hypertrophy noted.  Global left ventricular wall motion and contractility are within normal  limits.  There is an E to A  reversal in the mitral valve flow pattern suggestive  of diastolic dysfunction.  There is mild mitral regurgitation.   There is mild tricuspid regurgitation.  The right ventricular systolic pressure is calculated at 39 mmHg.   There is a small pericardial effusion.     Ejection Fractions  LVEF 55-60%  Cath:    Stress Test:    Cardiac Imaging:  Zio patch Decmber 2015: Sinus rhythm without significant abnormalities or arrhythmias     carotid ultrasound 5/25/2018   Right Carotid: Findings are consistent with less than 50% stenosis of the right  ICA. Laminar flow seen by color Doppler. Right external carotid artery appears  patent with no evidence of stenosis. The right vertebral artery is patent with  antegrade flow. No evidence of hemodynamically significant stenosis in the right  subclavian.  Left Carotid: Findings are consistent with less than 50% stenosis of the left  ICA. Left external carotid artery appears patent with no evidence of stenosis.  The left vertebral artery is patent with antegrade flow.  No evidence of hemodynamically significant stenosis in the left subclavian.    Assessment/Plan   Very pleasant 79-year-old female who has a history of syncopal episodes and hypertension, complains of increased dizziness.  Heart monitor showed a sinus rhythm, echocardiogram showed a preserved LV function. Blood pressure is elevated today, will continue to monitor.       Plan   -call with any questions   -monitor blood pressure at home   -currently taking the Lasix as needed   -continue Atorvastatin 40 mg daily   -Continue Amlodipine 5 mg twice a day   -follow up with ENT   -follow up in October       ALENA Kim-CNP

## 2025-06-06 NOTE — PATIENT INSTRUCTIONS
CALL WITH ANY QUESTIONS   NO MEDICATION CHANGES   MONITOR BLOOD PRESSURE AT HOME   FOLLOW UP IN OCTOBER

## 2025-07-22 ENCOUNTER — APPOINTMENT (OUTPATIENT)
Dept: PODIATRY | Facility: CLINIC | Age: 80
End: 2025-07-22
Payer: COMMERCIAL

## 2025-07-22 DIAGNOSIS — M79.672 PAIN IN BOTH FEET: Primary | ICD-10-CM

## 2025-07-22 DIAGNOSIS — B35.1 ONYCHOMYCOSIS: ICD-10-CM

## 2025-07-22 DIAGNOSIS — I73.9 PVD (PERIPHERAL VASCULAR DISEASE): ICD-10-CM

## 2025-07-22 DIAGNOSIS — M79.671 PAIN IN BOTH FEET: Primary | ICD-10-CM

## 2025-07-22 DIAGNOSIS — R26.2 DIFFICULTY WALKING: ICD-10-CM

## 2025-07-22 DIAGNOSIS — B35.3 TINEA PEDIS OF BOTH FEET: ICD-10-CM

## 2025-07-22 PROCEDURE — 1159F MED LIST DOCD IN RCRD: CPT | Performed by: PODIATRIST

## 2025-07-22 PROCEDURE — 11721 DEBRIDE NAIL 6 OR MORE: CPT | Performed by: PODIATRIST

## 2025-07-22 PROCEDURE — 1160F RVW MEDS BY RX/DR IN RCRD: CPT | Performed by: PODIATRIST

## 2025-07-22 PROCEDURE — 1036F TOBACCO NON-USER: CPT | Performed by: PODIATRIST

## 2025-07-22 NOTE — PROGRESS NOTES
History of Present Illness:   Patient states they are here for foot exam  Denies NTB to feet  Unable to safely trim nails on own    Presents with care taker    Past Medical History  Past Medical History:   Diagnosis Date    Ataxia, unspecified     Ataxia    Other conditions influencing health status     Cognitive Functions Current Level Impaired Mild    Other conditions influencing health status     Screening for malignant neoplasms, colon    Other specified anxiety disorders     Depression with anxiety    Other specified anxiety disorders     Depression with anxiety    Pain in unspecified joint     Multiple joint pain    Personal history of other diseases of the circulatory system 09/27/2018    History of hypertension    Personal history of other diseases of the nervous system and sense organs     History of sleep apnea    Personal history of other specified conditions     History of fatigue    Umbilical hernia without obstruction or gangrene     Umbilical hernia    Unspecified cataract     Cataract of both eyes    Unspecified convulsions (Multi)     Seizure    Unspecified convulsions (Multi)     Convulsive disorder       Medications and Allergies have been reviewed.    Review Of Systems:  GENERAL: No weight loss, malaise or fevers.  HEENT: Negative for frequent or significant headaches,   RESPIRATORY: Negative for cough, wheezing or shortness of breath.  CARDIOVASCULAR: Negative for chest pain, leg swelling or palpitations.    Examination of Both Lower Extremities:   Objective:   Vasc: DP and PT pulses are palpable bilateral.  CFT is less than 3 seconds bilateral.  Skin temperature is warm to cool proximal to distal bilateral.  Bl lower extremity swelling noted    Neuro: Vibratory, light touch and proprioception are intact bilateral.      Derm: Nails 1-5 bilateral are intact, thick and discolored. Mild plantar scaling skin    Ortho: Muscle strength is 5/5 for all pedal groups tested.      1. Difficulty walking         2. Pain in both feet        3. Onychomycosis          1. Pain in both feet        2. Onychomycosis        3. Tinea pedis of both feet        4. Difficulty walking              Patient exam and eval  Nails bl Debrided  Keep trim and filed down  Fu prn  Patient was in agreement to this plan. All questions answered.      Julianna Nelson DPM  [phone]  Option 2  Fax: [phone]

## 2025-08-29 ENCOUNTER — TELEPHONE (OUTPATIENT)
Dept: SLEEP MEDICINE | Facility: CLINIC | Age: 80
End: 2025-08-29
Payer: MEDICARE

## 2025-09-03 ENCOUNTER — APPOINTMENT (OUTPATIENT)
Dept: SLEEP MEDICINE | Facility: CLINIC | Age: 80
End: 2025-09-03
Payer: MEDICARE

## 2025-09-03 ASSESSMENT — LIFESTYLE VARIABLES
HOW MANY STANDARD DRINKS CONTAINING ALCOHOL DO YOU HAVE ON A TYPICAL DAY: PATIENT DOES NOT DRINK
HOW OFTEN DO YOU HAVE A DRINK CONTAINING ALCOHOL: NEVER
AUDIT-C TOTAL SCORE: 0
SKIP TO QUESTIONS 9-10: 1
HOW OFTEN DO YOU HAVE SIX OR MORE DRINKS ON ONE OCCASION: NEVER

## 2025-09-03 ASSESSMENT — PATIENT HEALTH QUESTIONNAIRE - PHQ9
1. LITTLE INTEREST OR PLEASURE IN DOING THINGS: NOT AT ALL
2. FEELING DOWN, DEPRESSED OR HOPELESS: NOT AT ALL
SUM OF ALL RESPONSES TO PHQ9 QUESTIONS 1 AND 2: 0

## 2025-09-03 ASSESSMENT — ENCOUNTER SYMPTOMS: OCCASIONAL FEELINGS OF UNSTEADINESS: 1

## 2025-09-18 ENCOUNTER — APPOINTMENT (OUTPATIENT)
Dept: OTOLARYNGOLOGY | Facility: CLINIC | Age: 80
End: 2025-09-18
Payer: MEDICARE

## 2025-09-18 ENCOUNTER — APPOINTMENT (OUTPATIENT)
Dept: AUDIOLOGY | Facility: CLINIC | Age: 80
End: 2025-09-18
Payer: MEDICARE

## 2025-10-13 ENCOUNTER — APPOINTMENT (OUTPATIENT)
Dept: RHEUMATOLOGY | Facility: CLINIC | Age: 80
End: 2025-10-13
Payer: MEDICARE

## 2025-10-20 ENCOUNTER — APPOINTMENT (OUTPATIENT)
Dept: RHEUMATOLOGY | Facility: CLINIC | Age: 80
End: 2025-10-20
Payer: MEDICARE

## 2025-11-11 ENCOUNTER — APPOINTMENT (OUTPATIENT)
Dept: PODIATRY | Facility: CLINIC | Age: 80
End: 2025-11-11
Payer: MEDICARE

## 2026-09-02 ENCOUNTER — APPOINTMENT (OUTPATIENT)
Dept: SLEEP MEDICINE | Facility: CLINIC | Age: 81
End: 2026-09-02
Payer: MEDICARE